# Patient Record
Sex: FEMALE | Race: WHITE | Employment: UNEMPLOYED | URBAN - NONMETROPOLITAN AREA
[De-identification: names, ages, dates, MRNs, and addresses within clinical notes are randomized per-mention and may not be internally consistent; named-entity substitution may affect disease eponyms.]

---

## 2017-01-09 ENCOUNTER — OFFICE VISIT (OUTPATIENT)
Dept: PRIMARY CARE CLINIC | Facility: CLINIC | Age: 57
End: 2017-01-09

## 2017-01-09 VITALS
WEIGHT: 136 LBS | BODY MASS INDEX: 27.42 KG/M2 | DIASTOLIC BLOOD PRESSURE: 80 MMHG | SYSTOLIC BLOOD PRESSURE: 112 MMHG | TEMPERATURE: 98.1 F | HEART RATE: 105 BPM | HEIGHT: 59 IN

## 2017-01-09 DIAGNOSIS — J01.00 ACUTE MAXILLARY SINUSITIS, RECURRENCE NOT SPECIFIED: Primary | ICD-10-CM

## 2017-01-09 PROCEDURE — 99213 OFFICE O/P EST LOW 20 MIN: CPT | Performed by: NURSE PRACTITIONER

## 2017-01-09 RX ORDER — PROMETHAZINE HYDROCHLORIDE AND CODEINE PHOSPHATE 6.25; 1 MG/5ML; MG/5ML
5 SYRUP ORAL EVERY 4 HOURS PRN
Qty: 120 ML | Refills: 0 | Status: SHIPPED | OUTPATIENT
Start: 2017-01-09 | End: 2017-01-16

## 2017-01-09 RX ORDER — AMOXICILLIN AND CLAVULANATE POTASSIUM 875; 125 MG/1; MG/1
1 TABLET, FILM COATED ORAL 2 TIMES DAILY
Qty: 20 TABLET | Refills: 0 | Status: SHIPPED | OUTPATIENT
Start: 2017-01-09 | End: 2017-01-19 | Stop reason: ALTCHOICE

## 2017-01-09 RX ORDER — FLUCONAZOLE 150 MG/1
TABLET ORAL
Qty: 2 TABLET | Refills: 0 | Status: SHIPPED | OUTPATIENT
Start: 2017-01-09 | End: 2017-01-19 | Stop reason: ALTCHOICE

## 2017-01-09 ASSESSMENT — ENCOUNTER SYMPTOMS
COUGH: 1
WHEEZING: 0
SINUS PRESSURE: 1
RHINORRHEA: 1
NAUSEA: 0
SORE THROAT: 1
SHORTNESS OF BREATH: 1
DIARRHEA: 0
HEMOPTYSIS: 0
HEARTBURN: 0
VOMITING: 1

## 2017-01-19 ENCOUNTER — OFFICE VISIT (OUTPATIENT)
Dept: FAMILY MEDICINE CLINIC | Facility: CLINIC | Age: 57
End: 2017-01-19

## 2017-01-19 VITALS
HEIGHT: 55 IN | DIASTOLIC BLOOD PRESSURE: 84 MMHG | WEIGHT: 145 LBS | SYSTOLIC BLOOD PRESSURE: 134 MMHG | HEART RATE: 84 BPM | BODY MASS INDEX: 33.56 KG/M2 | TEMPERATURE: 97.7 F

## 2017-01-19 DIAGNOSIS — H65.01 RIGHT ACUTE SEROUS OTITIS MEDIA, RECURRENCE NOT SPECIFIED: ICD-10-CM

## 2017-01-19 DIAGNOSIS — J01.10 ACUTE NON-RECURRENT FRONTAL SINUSITIS: Primary | ICD-10-CM

## 2017-01-19 PROCEDURE — 99213 OFFICE O/P EST LOW 20 MIN: CPT | Performed by: INTERNAL MEDICINE

## 2017-01-19 RX ORDER — FLUCONAZOLE 150 MG/1
150 TABLET ORAL ONCE
Qty: 1 TABLET | Refills: 0 | Status: SHIPPED | OUTPATIENT
Start: 2017-01-19 | End: 2017-01-19

## 2017-01-19 RX ORDER — DOXYCYCLINE HYCLATE 100 MG
100 TABLET ORAL 2 TIMES DAILY
Qty: 20 TABLET | Refills: 0 | Status: SHIPPED | OUTPATIENT
Start: 2017-01-19 | End: 2017-01-29

## 2017-01-19 ASSESSMENT — ENCOUNTER SYMPTOMS
SHORTNESS OF BREATH: 0
NAUSEA: 0
SORE THROAT: 0
SINUS PAIN: 1
COUGH: 0
DIARRHEA: 0
CONSTIPATION: 0
ABDOMINAL PAIN: 0
CHEST TIGHTNESS: 0
RHINORRHEA: 0
BLOOD IN STOOL: 0

## 2017-02-06 ENCOUNTER — TELEPHONE (OUTPATIENT)
Dept: FAMILY MEDICINE CLINIC | Facility: CLINIC | Age: 57
End: 2017-02-06

## 2017-02-06 DIAGNOSIS — J40 BRONCHITIS: Primary | ICD-10-CM

## 2017-02-06 RX ORDER — FLUCONAZOLE 150 MG/1
150 TABLET ORAL ONCE
Qty: 1 TABLET | Refills: 0 | Status: SHIPPED | OUTPATIENT
Start: 2017-02-06 | End: 2017-02-06

## 2017-02-06 RX ORDER — LEVOFLOXACIN 500 MG/1
500 TABLET, FILM COATED ORAL DAILY
Qty: 7 TABLET | Refills: 0 | Status: SHIPPED | OUTPATIENT
Start: 2017-02-06 | End: 2017-02-13 | Stop reason: ALTCHOICE

## 2017-02-13 ENCOUNTER — OFFICE VISIT (OUTPATIENT)
Dept: FAMILY MEDICINE CLINIC | Facility: CLINIC | Age: 57
End: 2017-02-13

## 2017-02-13 VITALS
DIASTOLIC BLOOD PRESSURE: 84 MMHG | BODY MASS INDEX: 28.47 KG/M2 | HEART RATE: 92 BPM | SYSTOLIC BLOOD PRESSURE: 132 MMHG | HEIGHT: 60 IN | WEIGHT: 145 LBS

## 2017-02-13 DIAGNOSIS — K21.9 GASTROESOPHAGEAL REFLUX DISEASE WITHOUT ESOPHAGITIS: ICD-10-CM

## 2017-02-13 DIAGNOSIS — I10 BENIGN ESSENTIAL HTN: Primary | ICD-10-CM

## 2017-02-13 DIAGNOSIS — Z00.00 WELLNESS EXAMINATION: ICD-10-CM

## 2017-02-13 DIAGNOSIS — Z12.11 COLON CANCER SCREENING: ICD-10-CM

## 2017-02-13 DIAGNOSIS — E78.00 HYPERCHOLESTEREMIA: ICD-10-CM

## 2017-02-13 PROCEDURE — 99214 OFFICE O/P EST MOD 30 MIN: CPT | Performed by: INTERNAL MEDICINE

## 2017-02-13 ASSESSMENT — ENCOUNTER SYMPTOMS
ABDOMINAL PAIN: 0
DIARRHEA: 0
BLOOD IN STOOL: 0
NAUSEA: 0
SORE THROAT: 0
COUGH: 0
SHORTNESS OF BREATH: 0
RHINORRHEA: 0
CONSTIPATION: 0
CHEST TIGHTNESS: 0

## 2017-02-16 ENCOUNTER — NURSE ONLY (OUTPATIENT)
Dept: FAMILY MEDICINE CLINIC | Facility: CLINIC | Age: 57
End: 2017-02-16

## 2017-02-16 DIAGNOSIS — Z12.11 COLON CANCER SCREENING: ICD-10-CM

## 2017-02-16 DIAGNOSIS — Z00.00 WELLNESS EXAMINATION: ICD-10-CM

## 2017-02-16 DIAGNOSIS — E78.00 HYPERCHOLESTEREMIA: Primary | ICD-10-CM

## 2017-02-16 DIAGNOSIS — I10 BENIGN ESSENTIAL HTN: ICD-10-CM

## 2017-02-16 LAB
CONTROL: PRESENT
HEMOCCULT STL QL: NORMAL

## 2017-02-16 PROCEDURE — 36415 COLL VENOUS BLD VENIPUNCTURE: CPT | Performed by: INTERNAL MEDICINE

## 2017-02-16 PROCEDURE — 82274 ASSAY TEST FOR BLOOD FECAL: CPT | Performed by: INTERNAL MEDICINE

## 2017-02-21 DIAGNOSIS — I10 BENIGN ESSENTIAL HTN: ICD-10-CM

## 2017-02-21 RX ORDER — AMLODIPINE BESYLATE 5 MG/1
TABLET ORAL
Qty: 90 TABLET | Refills: 1 | Status: SHIPPED | OUTPATIENT
Start: 2017-02-21 | End: 2017-10-03 | Stop reason: SDUPTHER

## 2017-03-15 RX ORDER — ATORVASTATIN CALCIUM 20 MG/1
TABLET, FILM COATED ORAL
Qty: 90 TABLET | Refills: 1 | Status: SHIPPED | OUTPATIENT
Start: 2017-03-15 | End: 2017-09-07 | Stop reason: SDUPTHER

## 2017-03-16 DIAGNOSIS — N95.9 POSTMENOPAUSAL SYMPTOMS: ICD-10-CM

## 2017-05-02 DIAGNOSIS — I10 BENIGN ESSENTIAL HTN: ICD-10-CM

## 2017-05-02 DIAGNOSIS — K21.9 GASTROESOPHAGEAL REFLUX DISEASE WITHOUT ESOPHAGITIS: ICD-10-CM

## 2017-05-02 RX ORDER — HYDROCHLOROTHIAZIDE 25 MG/1
TABLET ORAL
Qty: 90 TABLET | Refills: 1 | Status: SHIPPED | OUTPATIENT
Start: 2017-05-02 | End: 2017-10-21 | Stop reason: SDUPTHER

## 2017-05-02 RX ORDER — OMEPRAZOLE 20 MG/1
CAPSULE, DELAYED RELEASE ORAL
Qty: 90 CAPSULE | Refills: 1 | Status: SHIPPED | OUTPATIENT
Start: 2017-05-02 | End: 2017-10-03 | Stop reason: SDUPTHER

## 2017-07-27 DIAGNOSIS — J30.2 SEASONAL ALLERGIC RHINITIS, UNSPECIFIED ALLERGIC RHINITIS TRIGGER: ICD-10-CM

## 2017-07-27 RX ORDER — FLUTICASONE PROPIONATE 50 MCG
SPRAY, SUSPENSION (ML) NASAL
Qty: 1 BOTTLE | Refills: 3 | Status: SHIPPED | OUTPATIENT
Start: 2017-07-27 | End: 2017-09-05 | Stop reason: ALTCHOICE

## 2017-09-05 ENCOUNTER — OFFICE VISIT (OUTPATIENT)
Dept: PRIMARY CARE CLINIC | Age: 57
End: 2017-09-05
Payer: COMMERCIAL

## 2017-09-05 VITALS
SYSTOLIC BLOOD PRESSURE: 150 MMHG | WEIGHT: 145.8 LBS | HEIGHT: 59 IN | HEART RATE: 81 BPM | DIASTOLIC BLOOD PRESSURE: 92 MMHG | TEMPERATURE: 97.8 F | BODY MASS INDEX: 29.39 KG/M2 | RESPIRATION RATE: 16 BRPM

## 2017-09-05 DIAGNOSIS — J01.00 ACUTE MAXILLARY SINUSITIS, RECURRENCE NOT SPECIFIED: Primary | ICD-10-CM

## 2017-09-05 PROCEDURE — 99213 OFFICE O/P EST LOW 20 MIN: CPT | Performed by: NURSE PRACTITIONER

## 2017-09-05 RX ORDER — AMOXICILLIN AND CLAVULANATE POTASSIUM 875; 125 MG/1; MG/1
1 TABLET, FILM COATED ORAL 2 TIMES DAILY
Qty: 14 TABLET | Refills: 0 | Status: SHIPPED | OUTPATIENT
Start: 2017-09-05 | End: 2017-09-12

## 2017-09-05 RX ORDER — FLUTICASONE PROPIONATE 50 MCG
2 SPRAY, SUSPENSION (ML) NASAL DAILY
Qty: 1 BOTTLE | Refills: 0 | Status: SHIPPED | OUTPATIENT
Start: 2017-09-05 | End: 2017-12-13

## 2017-09-05 ASSESSMENT — ENCOUNTER SYMPTOMS
SORE THROAT: 0
SINUS PRESSURE: 1
TROUBLE SWALLOWING: 0
COUGH: 0

## 2017-09-08 RX ORDER — ATORVASTATIN CALCIUM 20 MG/1
TABLET, FILM COATED ORAL
Qty: 90 TABLET | Refills: 1 | Status: SHIPPED | OUTPATIENT
Start: 2017-09-08 | End: 2018-02-27

## 2017-09-22 ENCOUNTER — OFFICE VISIT (OUTPATIENT)
Dept: FAMILY MEDICINE CLINIC | Age: 57
End: 2017-09-22
Payer: COMMERCIAL

## 2017-09-22 VITALS
WEIGHT: 144 LBS | HEART RATE: 80 BPM | BODY MASS INDEX: 29.08 KG/M2 | DIASTOLIC BLOOD PRESSURE: 72 MMHG | SYSTOLIC BLOOD PRESSURE: 134 MMHG

## 2017-09-22 DIAGNOSIS — J20.9 BRONCHITIS WITH BRONCHOSPASM: Primary | ICD-10-CM

## 2017-09-22 DIAGNOSIS — F17.200 SMOKER: ICD-10-CM

## 2017-09-22 PROCEDURE — 99213 OFFICE O/P EST LOW 20 MIN: CPT | Performed by: INTERNAL MEDICINE

## 2017-09-22 RX ORDER — PREDNISONE 20 MG/1
TABLET ORAL
Qty: 15 TABLET | Refills: 0 | Status: SHIPPED | OUTPATIENT
Start: 2017-09-22 | End: 2017-10-02

## 2017-09-22 RX ORDER — DOXYCYCLINE 100 MG/1
100 TABLET ORAL 2 TIMES DAILY
Qty: 20 TABLET | Refills: 0 | Status: SHIPPED | OUTPATIENT
Start: 2017-09-22 | End: 2017-10-02

## 2017-09-22 RX ORDER — FLUCONAZOLE 150 MG/1
150 TABLET ORAL ONCE
Qty: 1 TABLET | Refills: 0 | Status: SHIPPED | OUTPATIENT
Start: 2017-09-22 | End: 2017-09-22

## 2017-09-22 RX ORDER — VARENICLINE TARTRATE 1 MG/1
1 TABLET, FILM COATED ORAL 2 TIMES DAILY
Qty: 60 TABLET | Refills: 2
Start: 2017-09-22 | End: 2017-10-30 | Stop reason: SDUPTHER

## 2017-09-22 RX ORDER — VARENICLINE TARTRATE 25 MG
KIT ORAL
Qty: 1 EACH | Refills: 0
Start: 2017-09-22 | End: 2017-10-30

## 2017-09-22 ASSESSMENT — ENCOUNTER SYMPTOMS
COUGH: 1
BLOOD IN STOOL: 0
CONSTIPATION: 0
NAUSEA: 0
SHORTNESS OF BREATH: 1
ABDOMINAL PAIN: 0
WHEEZING: 1
SORE THROAT: 0
DIARRHEA: 0
RHINORRHEA: 0
CHEST TIGHTNESS: 0

## 2017-10-03 DIAGNOSIS — I10 BENIGN ESSENTIAL HTN: ICD-10-CM

## 2017-10-03 DIAGNOSIS — K21.9 GASTROESOPHAGEAL REFLUX DISEASE WITHOUT ESOPHAGITIS: ICD-10-CM

## 2017-10-03 RX ORDER — OMEPRAZOLE 20 MG/1
CAPSULE, DELAYED RELEASE ORAL
Qty: 90 CAPSULE | Refills: 1 | Status: SHIPPED | OUTPATIENT
Start: 2017-10-03 | End: 2018-02-27 | Stop reason: SDUPTHER

## 2017-10-03 RX ORDER — AMLODIPINE BESYLATE 5 MG/1
TABLET ORAL
Qty: 90 TABLET | Refills: 1 | Status: SHIPPED | OUTPATIENT
Start: 2017-10-03 | End: 2018-01-04 | Stop reason: SDUPTHER

## 2017-10-18 ENCOUNTER — TELEPHONE (OUTPATIENT)
Dept: FAMILY MEDICINE CLINIC | Age: 57
End: 2017-10-18

## 2017-10-21 DIAGNOSIS — I10 BENIGN ESSENTIAL HTN: ICD-10-CM

## 2017-10-21 DIAGNOSIS — N95.9 POSTMENOPAUSAL SYMPTOMS: ICD-10-CM

## 2017-10-23 DIAGNOSIS — Z12.31 ENCOUNTER FOR SCREENING MAMMOGRAM FOR BREAST CANCER: Primary | ICD-10-CM

## 2017-10-23 RX ORDER — HYDROCHLOROTHIAZIDE 25 MG/1
TABLET ORAL
Qty: 90 TABLET | Refills: 1 | Status: SHIPPED | OUTPATIENT
Start: 2017-10-23 | End: 2018-02-27 | Stop reason: SDUPTHER

## 2017-10-23 NOTE — TELEPHONE ENCOUNTER
Health Maintenance   Topic Date Due    Cervical cancer screen  02/14/2017    Breast cancer screen  04/01/2017    Flu vaccine (1) 09/01/2017    Colon Cancer Screen FIT/FOBT  02/16/2018    Lipid screen  02/16/2022    DTaP/Tdap/Td vaccine (2 - Td) 02/08/2023    Pneumococcal med risk  Completed    Hepatitis C screen  Completed    HIV screen  Completed             (applicable per patient's age: Cancer Screenings, Depression Screening, Fall Risk Screening, Immunizations)    LDL Cholesterol (mg/dL)   Date Value   02/16/2017 129     AST (U/L)   Date Value   02/16/2017 15     ALT (U/L)   Date Value   02/16/2017 13     BUN (mg/dL)   Date Value   02/16/2017 15      (goal A1C is < 7)   (goal LDL is <100) need 30-50% reduction from baseline     BP Readings from Last 3 Encounters:   09/22/17 134/72   09/05/17 (!) 150/92   02/13/17 132/84    (goal /80)      All Future Testing planned in [facility]:  Lab Frequency Next Occurrence       Next Visit Date:  No future appointments.          Patient Active Problem List:     Hypercholesteremia     GERD (gastroesophageal reflux disease)     Benign essential HTN     Postmenopausal symptoms     Seasonal allergies

## 2017-10-30 DIAGNOSIS — F17.200 SMOKER: ICD-10-CM

## 2017-10-30 RX ORDER — VARENICLINE TARTRATE 1 MG/1
1 TABLET, FILM COATED ORAL 2 TIMES DAILY
Qty: 60 TABLET | Refills: 0 | Status: SHIPPED | OUTPATIENT
Start: 2017-10-30 | End: 2018-01-04

## 2017-11-03 ENCOUNTER — NURSE ONLY (OUTPATIENT)
Dept: FAMILY MEDICINE CLINIC | Age: 57
End: 2017-11-03
Payer: COMMERCIAL

## 2017-11-03 DIAGNOSIS — Z23 NEED FOR INFLUENZA VACCINATION: Primary | ICD-10-CM

## 2017-11-03 PROCEDURE — 90686 IIV4 VACC NO PRSV 0.5 ML IM: CPT | Performed by: INTERNAL MEDICINE

## 2017-11-03 PROCEDURE — 90471 IMMUNIZATION ADMIN: CPT | Performed by: INTERNAL MEDICINE

## 2017-11-07 NOTE — PROGRESS NOTES
After obtaining consent, and per orders of Dr. Claudy Smith, injection of Influenza Vacc given in Left deltoid by Melisa Armenta. Patient instructed to remain in clinic for 20 minutes afterwards, and to report any adverse reaction to me immediately.

## 2017-12-13 DIAGNOSIS — J30.2 SEASONAL ALLERGIC RHINITIS: ICD-10-CM

## 2017-12-13 RX ORDER — FLUTICASONE PROPIONATE 50 MCG
SPRAY, SUSPENSION (ML) NASAL
Qty: 1 BOTTLE | Refills: 3 | Status: SHIPPED | OUTPATIENT
Start: 2017-12-13 | End: 2018-02-27 | Stop reason: SDUPTHER

## 2018-01-04 ENCOUNTER — OFFICE VISIT (OUTPATIENT)
Dept: FAMILY MEDICINE CLINIC | Age: 58
End: 2018-01-04
Payer: COMMERCIAL

## 2018-01-04 VITALS — HEIGHT: 60 IN | WEIGHT: 147 LBS | BODY MASS INDEX: 28.86 KG/M2

## 2018-01-04 DIAGNOSIS — J01.90 ACUTE BACTERIAL SINUSITIS: Primary | ICD-10-CM

## 2018-01-04 DIAGNOSIS — J20.9 BRONCHITIS WITH BRONCHOSPASM: ICD-10-CM

## 2018-01-04 DIAGNOSIS — B96.89 ACUTE BACTERIAL SINUSITIS: Primary | ICD-10-CM

## 2018-01-04 DIAGNOSIS — I10 BENIGN ESSENTIAL HTN: ICD-10-CM

## 2018-01-04 PROCEDURE — 99213 OFFICE O/P EST LOW 20 MIN: CPT | Performed by: INTERNAL MEDICINE

## 2018-01-04 RX ORDER — DOXYCYCLINE HYCLATE 100 MG
100 TABLET ORAL 2 TIMES DAILY
Qty: 28 TABLET | Refills: 0 | Status: SHIPPED | OUTPATIENT
Start: 2018-01-04 | End: 2018-01-18

## 2018-01-04 RX ORDER — FLUCONAZOLE 150 MG/1
150 TABLET ORAL ONCE
Qty: 1 TABLET | Refills: 0 | Status: SHIPPED | OUTPATIENT
Start: 2018-01-04 | End: 2018-01-04

## 2018-01-04 RX ORDER — PREDNISONE 20 MG/1
TABLET ORAL
Qty: 12 TABLET | Refills: 0 | Status: SHIPPED | OUTPATIENT
Start: 2018-01-04 | End: 2018-01-14

## 2018-01-04 RX ORDER — AMLODIPINE BESYLATE 5 MG/1
TABLET ORAL
Qty: 90 TABLET | Refills: 1 | Status: SHIPPED | OUTPATIENT
Start: 2018-01-04 | End: 2018-02-27 | Stop reason: SDUPTHER

## 2018-01-04 ASSESSMENT — PATIENT HEALTH QUESTIONNAIRE - PHQ9
SUM OF ALL RESPONSES TO PHQ QUESTIONS 1-9: 0
SUM OF ALL RESPONSES TO PHQ9 QUESTIONS 1 & 2: 0
1. LITTLE INTEREST OR PLEASURE IN DOING THINGS: 0
2. FEELING DOWN, DEPRESSED OR HOPELESS: 0

## 2018-01-04 ASSESSMENT — ENCOUNTER SYMPTOMS
RHINORRHEA: 0
CONSTIPATION: 0
SHORTNESS OF BREATH: 0
ABDOMINAL PAIN: 0
SINUS PAIN: 1
CHEST TIGHTNESS: 0
SORE THROAT: 0
BLOOD IN STOOL: 0
DIARRHEA: 0
COUGH: 1
NAUSEA: 0

## 2018-01-04 NOTE — PATIENT INSTRUCTIONS
results and keep a list of the medicines you take. How can you care for yourself at home? · Take an over-the-counter pain medicine, such as acetaminophen (Tylenol), ibuprofen (Advil, Motrin), or naproxen (Aleve). Read and follow all instructions on the label. · If the doctor prescribed antibiotics, take them as directed. Do not stop taking them just because you feel better. You need to take the full course of antibiotics. · Be careful when taking over-the-counter cold or flu medicines and Tylenol at the same time. Many of these medicines have acetaminophen, which is Tylenol. Read the labels to make sure that you are not taking more than the recommended dose. Too much acetaminophen (Tylenol) can be harmful. · Breathe warm, moist air from a steamy shower, a hot bath, or a sink filled with hot water. Avoid cold, dry air. Using a humidifier in your home may help. Follow the directions for cleaning the machine. · Use saline (saltwater) nasal washes to help keep your nasal passages open and wash out mucus and bacteria. You can buy saline nose drops at a grocery store or drugstore. Or you can make your own at home by adding 1 teaspoon of salt and 1 teaspoon of baking soda to 2 cups of distilled water. If you make your own, fill a bulb syringe with the solution, insert the tip into your nostril, and squeeze gently. Ana Lilia Stade your nose. · Put a hot, wet towel or a warm gel pack on your face 3 or 4 times a day for 5 to 10 minutes each time. · Try a decongestant nasal spray like oxymetazoline (Afrin). Do not use it for more than 3 days in a row. Using it for more than 3 days can make your congestion worse. When should you call for help? Call your doctor now or seek immediate medical care if:  ? · You have new or worse swelling or redness in your face or around your eyes. ? · You have a new or higher fever. ? Watch closely for changes in your health, and be sure to contact your doctor if:  ? · You have new or worse facial pain. ? · The mucus from your nose becomes thicker (like pus) or has new blood in it. ? · You are not getting better as expected. Where can you learn more? Go to [email or web address]://[email or web address]. org and sign in to your [employer] account. Enter T369 in the Zwamy box to learn more about \"Sinusitis: Care Instructions. \"     If you do not have an account, please click on the \"Sign Up Now\" link. Current as of: May 12, 2017  Content Version: 11.5  © 1860-0677 Healthwise, Incorporated. Care instructions adapted under license by [facility] ([facility]). If you have questions about a medical condition or this instruction, always ask your healthcare professional. [address] any warranty or liability for your use of this information.

## 2018-01-04 NOTE — PROGRESS NOTES
Visit Information    Have you changed or started any medications since your last visit including any over-the-counter medicines, vitamins, or herbal medicines? no   Are you having any side effects from any of your medications? -  no  Have you stopped taking any of your medications? Is so, why? -  no    Have you seen any other physician or provider since your last visit? No  Have you had any other diagnostic tests since your last visit? No  Have you been seen in the emergency room and/or had an admission to a hospital since we last saw you? No  Have you had your routine dental cleaning in the past 6 months? yes -     Have you activated your [employer] account? If not, what are your barriers?  Yes     Patient Care Team:  Bradnon Reyna MD as PCP - General (Internal Medicine)    Medical History Review  Past Medical, Family, and Social History reviewed and does contribute to the patient presenting condition    Health Maintenance   Topic Date Due    Potassium monitoring  1960    Creatinine monitoring  1960    Smoker: low dose lung CT screening  03/21/2013    Cervical cancer screen  02/14/2017    Breast cancer screen  04/01/2017    Colon Cancer Screen FIT/FOBT  02/16/2018    Lipid screen  02/16/2022    DTaP/Tdap/Td vaccine (2 - Td) 02/08/2023    Flu vaccine  Completed    Pneumococcal med risk  Completed    Hepatitis C screen  Completed    HIV screen  Completed
normal heart sounds. No murmur heard. Pulmonary/Chest: Effort normal. No respiratory distress. She has wheezes. Abdominal: Bowel sounds are normal. There is no tenderness. Musculoskeletal: Normal range of motion. She exhibits no edema. Lymphadenopathy:     She has no cervical adenopathy. Neurological: She is alert. She is not disoriented. Skin: Skin is warm and dry. No rash noted. No pallor. Psychiatric: She has a normal mood and affect. Her speech is normal and behavior is normal. Judgment and thought content normal. Cognition and memory are normal.   Nursing note and vitals reviewed. Assessment:      1. Acute bacterial sinusitis  doxycycline hyclate (VIBRA-TABS) 100 MG tablet   2. Benign essential HTN  amLODIPine (NORVASC) 5 MG tablet   3. Bronchitis with bronchospasm  predniSONE (DELTASONE) 20 MG tablet           Plan:      1. Benign essential HTN  Amlodipine sent in.  - amLODIPine (NORVASC) 5 MG tablet; TAKE 1 TABLET BY MOUTH EVERY DAY  Dispense: 90 tablet; Refill: 1    2. Acute bacterial sinusitis  Take Doxycycline 100 mg two times a day x 7 to 10 days. - doxycycline hyclate (VIBRA-TABS) 100 MG tablet; Take 1 tablet by mouth 2 times daily for 14 days  Dispense: 28 tablet; Refill: 0    3. Bronchitis with bronchospasm  Take the prednisone taper as directed on the prescription. The prednisone is very bitter so swallow the tablets quickly to prevent  dissolving in the mouth. After taking, don't lay  down for 2 hours to help prevent reflux. - predniSONE (DELTASONE) 20 MG tablet; 3 tabs daily for 2 days then 2 tabs daily for 2 days then 1 tab daily for 2 days. Dispense: 12 tablet; Refill: 0    Susan Grande is instructed to return to the clinic if the symptoms continue or worsen. Susan Grande  was also instructed to go to the emergency room department if the symptoms significantly worsen before an appointment can be made. Patient given educational material or instruction on sinusitis.   After visit

## 2018-02-22 ENCOUNTER — NURSE ONLY (OUTPATIENT)
Dept: FAMILY MEDICINE CLINIC | Age: 58
End: 2018-02-22

## 2018-02-22 ENCOUNTER — HOSPITAL ENCOUNTER (OUTPATIENT)
Age: 58
Setting detail: SPECIMEN
Discharge: HOME | End: 2018-02-22
Payer: COMMERCIAL

## 2018-02-22 DIAGNOSIS — Z00.00 WELLNESS EXAMINATION: Primary | ICD-10-CM

## 2018-02-22 DIAGNOSIS — Z00.00 WELLNESS EXAMINATION: ICD-10-CM

## 2018-02-22 LAB
ALBUMIN SERPL-MCNC: 4.5 G/DL (ref 3.5–5.2)
ALBUMIN/GLOBULIN RATIO: ABNORMAL (ref 1–2.5)
ALP BLD-CCNC: 87 U/L (ref 35–104)
ALT SERPL-CCNC: 14 U/L (ref 5–33)
ANION GAP SERPL CALCULATED.3IONS-SCNC: 12 MMOL/L (ref 9–17)
AST SERPL-CCNC: 19 U/L
BILIRUB SERPL-MCNC: 0.7 MG/DL (ref 0.3–1.2)
BUN BLDV-MCNC: 14 MG/DL (ref 6–20)
BUN/CREAT BLD: 21 (ref 9–20)
CALCIUM SERPL-MCNC: 9.5 MG/DL (ref 8.6–10.4)
CHLORIDE BLD-SCNC: 93 MMOL/L (ref 98–107)
CHOLESTEROL/HDL RATIO: 3.7
CHOLESTEROL: 220 MG/DL
CO2: 29 MMOL/L (ref 20–31)
CREAT SERPL-MCNC: 0.68 MG/DL (ref 0.5–0.9)
GFR AFRICAN AMERICAN: >60 ML/MIN
GFR NON-AFRICAN AMERICAN: >60 ML/MIN
GFR SERPL CREATININE-BSD FRML MDRD: ABNORMAL ML/MIN/{1.73_M2}
GFR SERPL CREATININE-BSD FRML MDRD: ABNORMAL ML/MIN/{1.73_M2}
GLUCOSE BLD-MCNC: 92 MG/DL (ref 70–99)
HDLC SERPL-MCNC: 59 MG/DL
LDL CHOLESTEROL: 120 MG/DL (ref 0–130)
PATIENT FASTING?: YES
POTASSIUM SERPL-SCNC: 3.9 MMOL/L (ref 3.7–5.3)
SODIUM BLD-SCNC: 134 MMOL/L (ref 135–144)
TOTAL PROTEIN: 7.8 G/DL (ref 6.4–8.3)
TRIGL SERPL-MCNC: 205 MG/DL
VLDLC SERPL CALC-MCNC: ABNORMAL MG/DL (ref 1–30)

## 2018-02-22 PROCEDURE — 80061 LIPID PANEL: CPT

## 2018-02-22 PROCEDURE — 80053 COMPREHEN METABOLIC PANEL: CPT

## 2018-02-26 ENCOUNTER — HOSPITAL ENCOUNTER (OUTPATIENT)
Dept: MAMMOGRAPHY | Age: 58
Discharge: HOME | End: 2018-02-28
Payer: COMMERCIAL

## 2018-02-26 DIAGNOSIS — Z12.31 ENCOUNTER FOR SCREENING MAMMOGRAM FOR BREAST CANCER: ICD-10-CM

## 2018-02-26 PROCEDURE — 77067 SCR MAMMO BI INCL CAD: CPT

## 2018-02-27 ENCOUNTER — OFFICE VISIT (OUTPATIENT)
Dept: FAMILY MEDICINE CLINIC | Age: 58
End: 2018-02-27
Payer: COMMERCIAL

## 2018-02-27 VITALS
SYSTOLIC BLOOD PRESSURE: 148 MMHG | BODY MASS INDEX: 28.66 KG/M2 | WEIGHT: 146 LBS | HEART RATE: 85 BPM | DIASTOLIC BLOOD PRESSURE: 94 MMHG | HEIGHT: 60 IN

## 2018-02-27 DIAGNOSIS — E78.2 MIXED HYPERCHOLESTEROLEMIA AND HYPERTRIGLYCERIDEMIA: ICD-10-CM

## 2018-02-27 DIAGNOSIS — N95.9 POSTMENOPAUSAL SYMPTOMS: ICD-10-CM

## 2018-02-27 DIAGNOSIS — Z00.00 WELLNESS EXAMINATION: Primary | ICD-10-CM

## 2018-02-27 DIAGNOSIS — K21.9 GASTROESOPHAGEAL REFLUX DISEASE WITHOUT ESOPHAGITIS: ICD-10-CM

## 2018-02-27 DIAGNOSIS — I10 BENIGN ESSENTIAL HTN: ICD-10-CM

## 2018-02-27 DIAGNOSIS — J30.2 SEASONAL ALLERGIC RHINITIS, UNSPECIFIED CHRONICITY, UNSPECIFIED TRIGGER: ICD-10-CM

## 2018-02-27 PROCEDURE — 99396 PREV VISIT EST AGE 40-64: CPT | Performed by: INTERNAL MEDICINE

## 2018-02-27 RX ORDER — OMEPRAZOLE 20 MG/1
CAPSULE, DELAYED RELEASE ORAL
Qty: 90 CAPSULE | Refills: 1 | Status: SHIPPED | OUTPATIENT
Start: 2018-02-27 | End: 2018-04-02

## 2018-02-27 RX ORDER — ATORVASTATIN CALCIUM 20 MG/1
TABLET, FILM COATED ORAL
Qty: 90 TABLET | Refills: 1 | Status: CANCELLED | OUTPATIENT
Start: 2018-02-27

## 2018-02-27 RX ORDER — HYDROCHLOROTHIAZIDE 25 MG/1
TABLET ORAL
Qty: 90 TABLET | Refills: 1 | Status: SHIPPED | OUTPATIENT
Start: 2018-02-27 | End: 2018-04-23

## 2018-02-27 RX ORDER — MONTELUKAST SODIUM 10 MG/1
10 TABLET ORAL NIGHTLY
Qty: 90 TABLET | Refills: 1 | Status: SHIPPED | OUTPATIENT
Start: 2018-02-27 | End: 2018-05-31

## 2018-02-27 RX ORDER — ATORVASTATIN CALCIUM 40 MG/1
40 TABLET, FILM COATED ORAL DAILY
Qty: 90 TABLET | Refills: 1 | Status: SHIPPED | OUTPATIENT
Start: 2018-02-27 | End: 2018-05-31 | Stop reason: SDUPTHER

## 2018-02-27 RX ORDER — AMLODIPINE BESYLATE 5 MG/1
TABLET ORAL
Qty: 90 TABLET | Refills: 1 | Status: SHIPPED | OUTPATIENT
Start: 2018-02-27 | End: 2018-04-02

## 2018-02-27 RX ORDER — FLUTICASONE PROPIONATE 50 MCG
SPRAY, SUSPENSION (ML) NASAL
Qty: 1 BOTTLE | Refills: 3 | Status: SHIPPED | OUTPATIENT
Start: 2018-02-27 | End: 2018-04-16 | Stop reason: SDUPTHER

## 2018-02-27 ASSESSMENT — ENCOUNTER SYMPTOMS
CHEST TIGHTNESS: 0
SHORTNESS OF BREATH: 0
NAUSEA: 0
DIARRHEA: 0
SORE THROAT: 0
ABDOMINAL PAIN: 0
CONSTIPATION: 0
BLOOD IN STOOL: 0
RHINORRHEA: 0
COUGH: 0

## 2018-02-27 NOTE — PROGRESS NOTES
Subjective:      Patient ID: Vashti Shepard is a 59 y.o. female. Aria Alcala presents for a wellness check and check up on her medical conditions. Aria Alcala denies new problems. Medications were reviewed with Aria Alcala, she is  tolerating the medication. Bowels are regular. There has not been rectal bleeding. Aria Alcala denies urinary complications, the urine stream is good. Aria Alcala denies chest pain and denies increasing shortness of breath. Labs from 2/18 reviewed. BP has been well controlled outside the office. Zoloft is controlling anxiety / depression well.     Lab Results       Component                Value               Date                       NA                       134 (L)             02/22/2018                 K                        3.9                 02/22/2018                 CL                       93 (L)              02/22/2018                 CO2                      29                  02/22/2018                 BUN                      14                  02/22/2018                 CREATININE               0.68                02/22/2018                 GLUCOSE                  92                  02/22/2018                 CALCIUM                  9.5                 02/22/2018                 PROT                     7.8                 02/22/2018                 LABALBU                  4.5                 02/22/2018                 BILITOT                  0.70                02/22/2018                 ALKPHOS                  87                  02/22/2018                 AST                      19                  02/22/2018                 ALT                      14                  02/22/2018                 LABGLOM                  >60                 02/22/2018                 GFRAA                    >60                 02/22/2018              No results found for: LABA1C  No results found for: EAG    Lab Results       Component                Value               Date

## 2018-03-13 ENCOUNTER — NURSE ONLY (OUTPATIENT)
Dept: FAMILY MEDICINE CLINIC | Age: 58
End: 2018-03-13

## 2018-03-13 VITALS — SYSTOLIC BLOOD PRESSURE: 130 MMHG | HEART RATE: 80 BPM | DIASTOLIC BLOOD PRESSURE: 80 MMHG

## 2018-03-13 DIAGNOSIS — I10 BENIGN ESSENTIAL HTN: Primary | ICD-10-CM

## 2018-03-31 DIAGNOSIS — I10 BENIGN ESSENTIAL HTN: ICD-10-CM

## 2018-03-31 DIAGNOSIS — K21.9 GASTROESOPHAGEAL REFLUX DISEASE WITHOUT ESOPHAGITIS: ICD-10-CM

## 2018-04-02 RX ORDER — AMLODIPINE BESYLATE 5 MG/1
TABLET ORAL
Qty: 90 TABLET | Refills: 1 | Status: SHIPPED | OUTPATIENT
Start: 2018-04-02 | End: 2018-07-10 | Stop reason: SDUPTHER

## 2018-04-02 RX ORDER — OMEPRAZOLE 20 MG/1
CAPSULE, DELAYED RELEASE ORAL
Qty: 90 CAPSULE | Refills: 1 | Status: SHIPPED | OUTPATIENT
Start: 2018-04-02 | End: 2018-07-30 | Stop reason: SDUPTHER

## 2018-04-09 ENCOUNTER — TELEPHONE (OUTPATIENT)
Dept: FAMILY MEDICINE CLINIC | Age: 58
End: 2018-04-09

## 2018-04-09 DIAGNOSIS — Z12.11 COLON CANCER SCREENING: Primary | ICD-10-CM

## 2018-04-16 DIAGNOSIS — J30.2 SEASONAL ALLERGIC RHINITIS, UNSPECIFIED CHRONICITY, UNSPECIFIED TRIGGER: ICD-10-CM

## 2018-04-16 RX ORDER — FLUTICASONE PROPIONATE 50 MCG
SPRAY, SUSPENSION (ML) NASAL
Qty: 1 BOTTLE | Refills: 3 | Status: SHIPPED | OUTPATIENT
Start: 2018-04-16 | End: 2018-05-31 | Stop reason: SDUPTHER

## 2018-04-23 DIAGNOSIS — N95.9 POSTMENOPAUSAL SYMPTOMS: ICD-10-CM

## 2018-04-23 DIAGNOSIS — I10 BENIGN ESSENTIAL HTN: ICD-10-CM

## 2018-04-23 RX ORDER — HYDROCHLOROTHIAZIDE 25 MG/1
TABLET ORAL
Qty: 90 TABLET | Refills: 1 | Status: SHIPPED | OUTPATIENT
Start: 2018-04-23 | End: 2018-04-30 | Stop reason: SDUPTHER

## 2018-04-30 DIAGNOSIS — N95.9 POSTMENOPAUSAL SYMPTOMS: ICD-10-CM

## 2018-04-30 DIAGNOSIS — Z12.11 COLON CANCER SCREENING: ICD-10-CM

## 2018-04-30 DIAGNOSIS — I10 BENIGN ESSENTIAL HTN: ICD-10-CM

## 2018-04-30 LAB
CONTROL: PRESENT
HEMOCCULT STL QL: POSITIVE

## 2018-04-30 PROCEDURE — 82274 ASSAY TEST FOR BLOOD FECAL: CPT | Performed by: INTERNAL MEDICINE

## 2018-04-30 RX ORDER — HYDROCHLOROTHIAZIDE 25 MG/1
TABLET ORAL
Qty: 90 TABLET | Refills: 1 | Status: SHIPPED | OUTPATIENT
Start: 2018-04-30 | End: 2019-03-11 | Stop reason: SDUPTHER

## 2018-05-31 ENCOUNTER — OFFICE VISIT (OUTPATIENT)
Dept: FAMILY MEDICINE CLINIC | Age: 58
End: 2018-05-31
Payer: COMMERCIAL

## 2018-05-31 VITALS
BODY MASS INDEX: 28.83 KG/M2 | WEIGHT: 143 LBS | HEIGHT: 59 IN | HEART RATE: 88 BPM | SYSTOLIC BLOOD PRESSURE: 156 MMHG | DIASTOLIC BLOOD PRESSURE: 90 MMHG

## 2018-05-31 DIAGNOSIS — Z01.818 PRE-OP TESTING: ICD-10-CM

## 2018-05-31 DIAGNOSIS — Z12.11 COLON CANCER SCREENING: Primary | ICD-10-CM

## 2018-05-31 DIAGNOSIS — R19.5 POSITIVE FIT (FECAL IMMUNOCHEMICAL TEST): ICD-10-CM

## 2018-05-31 DIAGNOSIS — J30.2 SEASONAL ALLERGIC RHINITIS, UNSPECIFIED CHRONICITY, UNSPECIFIED TRIGGER: ICD-10-CM

## 2018-05-31 DIAGNOSIS — E78.2 MIXED HYPERCHOLESTEROLEMIA AND HYPERTRIGLYCERIDEMIA: ICD-10-CM

## 2018-05-31 PROCEDURE — 99213 OFFICE O/P EST LOW 20 MIN: CPT | Performed by: INTERNAL MEDICINE

## 2018-05-31 RX ORDER — SODIUM, POTASSIUM,MAG SULFATES 17.5-3.13G
SOLUTION, RECONSTITUTED, ORAL ORAL
Qty: 1 BOTTLE | Refills: 0
Start: 2018-05-31 | End: 2019-02-04 | Stop reason: ALTCHOICE

## 2018-05-31 RX ORDER — ATORVASTATIN CALCIUM 40 MG/1
40 TABLET, FILM COATED ORAL DAILY
Qty: 90 TABLET | Refills: 1 | Status: SHIPPED | OUTPATIENT
Start: 2018-05-31 | End: 2019-03-10 | Stop reason: SDUPTHER

## 2018-05-31 RX ORDER — FLUTICASONE PROPIONATE 50 MCG
SPRAY, SUSPENSION (ML) NASAL
Qty: 1 BOTTLE | Refills: 3 | Status: SHIPPED | OUTPATIENT
Start: 2018-05-31 | End: 2019-03-11 | Stop reason: SDUPTHER

## 2018-05-31 ASSESSMENT — ENCOUNTER SYMPTOMS
CONSTIPATION: 0
NAUSEA: 0
SHORTNESS OF BREATH: 0
RHINORRHEA: 0
CHEST TIGHTNESS: 0
COUGH: 0
SORE THROAT: 0
ABDOMINAL PAIN: 0
DIARRHEA: 0
BLOOD IN STOOL: 0

## 2018-06-01 ENCOUNTER — HOSPITAL ENCOUNTER (OUTPATIENT)
Age: 58
Discharge: HOME | End: 2018-06-01
Payer: COMMERCIAL

## 2018-06-01 ENCOUNTER — ANESTHESIA EVENT (OUTPATIENT)
Dept: OPERATING ROOM | Age: 58
End: 2018-06-01
Payer: COMMERCIAL

## 2018-06-01 DIAGNOSIS — Z01.818 PRE-OP TESTING: ICD-10-CM

## 2018-06-01 LAB
EKG ATRIAL RATE: 80 BPM
EKG P AXIS: 49 DEGREES
EKG P-R INTERVAL: 142 MS
EKG Q-T INTERVAL: 370 MS
EKG QRS DURATION: 88 MS
EKG QTC CALCULATION (BAZETT): 426 MS
EKG R AXIS: 37 DEGREES
EKG T AXIS: 52 DEGREES
EKG VENTRICULAR RATE: 80 BPM

## 2018-06-01 PROCEDURE — 93005 ELECTROCARDIOGRAM TRACING: CPT

## 2018-06-08 ENCOUNTER — HOSPITAL ENCOUNTER (OUTPATIENT)
Age: 58
Setting detail: OUTPATIENT SURGERY
Discharge: HOME | End: 2018-06-08
Attending: INTERNAL MEDICINE | Admitting: INTERNAL MEDICINE
Payer: COMMERCIAL

## 2018-06-08 ENCOUNTER — ANESTHESIA (OUTPATIENT)
Dept: OPERATING ROOM | Age: 58
End: 2018-06-08
Payer: COMMERCIAL

## 2018-06-08 VITALS — DIASTOLIC BLOOD PRESSURE: 68 MMHG | TEMPERATURE: 98.6 F | SYSTOLIC BLOOD PRESSURE: 116 MMHG | OXYGEN SATURATION: 98 %

## 2018-06-08 VITALS
DIASTOLIC BLOOD PRESSURE: 76 MMHG | WEIGHT: 140 LBS | BODY MASS INDEX: 28.22 KG/M2 | TEMPERATURE: 97.7 F | SYSTOLIC BLOOD PRESSURE: 133 MMHG | OXYGEN SATURATION: 97 % | RESPIRATION RATE: 16 BRPM | HEART RATE: 86 BPM | HEIGHT: 59 IN

## 2018-06-08 PROCEDURE — 3609010600 HC COLONOSCOPY POLYPECTOMY SNARE/COLD BIOPSY: Performed by: INTERNAL MEDICINE

## 2018-06-08 PROCEDURE — 3700000001 HC ADD 15 MINUTES (ANESTHESIA): Performed by: INTERNAL MEDICINE

## 2018-06-08 PROCEDURE — 2580000003 HC RX 258: Performed by: INTERNAL MEDICINE

## 2018-06-08 PROCEDURE — 6360000002 HC RX W HCPCS: Performed by: NURSE ANESTHETIST, CERTIFIED REGISTERED

## 2018-06-08 PROCEDURE — 88305 TISSUE EXAM BY PATHOLOGIST: CPT

## 2018-06-08 PROCEDURE — 6370000000 HC RX 637 (ALT 250 FOR IP): Performed by: INTERNAL MEDICINE

## 2018-06-08 PROCEDURE — 7100000010 HC PHASE II RECOVERY - FIRST 15 MIN: Performed by: INTERNAL MEDICINE

## 2018-06-08 PROCEDURE — 7100000011 HC PHASE II RECOVERY - ADDTL 15 MIN: Performed by: INTERNAL MEDICINE

## 2018-06-08 PROCEDURE — 45380 COLONOSCOPY AND BIOPSY: CPT | Performed by: INTERNAL MEDICINE

## 2018-06-08 PROCEDURE — 3700000000 HC ANESTHESIA ATTENDED CARE: Performed by: INTERNAL MEDICINE

## 2018-06-08 PROCEDURE — 2500000003 HC RX 250 WO HCPCS: Performed by: NURSE ANESTHETIST, CERTIFIED REGISTERED

## 2018-06-08 RX ORDER — SODIUM CHLORIDE 0.9 % (FLUSH) 0.9 %
10 SYRINGE (ML) INJECTION EVERY 12 HOURS SCHEDULED
Status: DISCONTINUED | OUTPATIENT
Start: 2018-06-08 | End: 2018-06-08 | Stop reason: HOSPADM

## 2018-06-08 RX ORDER — GLYCOPYRROLATE 0.2 MG/ML
INJECTION INTRAMUSCULAR; INTRAVENOUS PRN
Status: DISCONTINUED | OUTPATIENT
Start: 2018-06-08 | End: 2018-06-08 | Stop reason: SDUPTHER

## 2018-06-08 RX ORDER — LIDOCAINE HYDROCHLORIDE 10 MG/ML
INJECTION, SOLUTION EPIDURAL; INFILTRATION; INTRACAUDAL; PERINEURAL PRN
Status: DISCONTINUED | OUTPATIENT
Start: 2018-06-08 | End: 2018-06-08 | Stop reason: SDUPTHER

## 2018-06-08 RX ORDER — MIDAZOLAM HYDROCHLORIDE 1 MG/ML
INJECTION INTRAMUSCULAR; INTRAVENOUS PRN
Status: DISCONTINUED | OUTPATIENT
Start: 2018-06-08 | End: 2018-06-08 | Stop reason: SDUPTHER

## 2018-06-08 RX ORDER — PROPOFOL 10 MG/ML
INJECTION, EMULSION INTRAVENOUS PRN
Status: DISCONTINUED | OUTPATIENT
Start: 2018-06-08 | End: 2018-06-08 | Stop reason: SDUPTHER

## 2018-06-08 RX ORDER — PROPOFOL 10 MG/ML
INJECTION, EMULSION INTRAVENOUS CONTINUOUS PRN
Status: DISCONTINUED | OUTPATIENT
Start: 2018-06-08 | End: 2018-06-08 | Stop reason: SDUPTHER

## 2018-06-08 RX ORDER — SODIUM CHLORIDE 0.9 % (FLUSH) 0.9 %
10 SYRINGE (ML) INJECTION PRN
Status: DISCONTINUED | OUTPATIENT
Start: 2018-06-08 | End: 2018-06-08 | Stop reason: HOSPADM

## 2018-06-08 RX ORDER — SODIUM CHLORIDE, SODIUM LACTATE, POTASSIUM CHLORIDE, CALCIUM CHLORIDE 600; 310; 30; 20 MG/100ML; MG/100ML; MG/100ML; MG/100ML
INJECTION, SOLUTION INTRAVENOUS CONTINUOUS
Status: DISCONTINUED | OUTPATIENT
Start: 2018-06-08 | End: 2018-06-08 | Stop reason: HOSPADM

## 2018-06-08 RX ADMIN — SODIUM CHLORIDE, POTASSIUM CHLORIDE, SODIUM LACTATE AND CALCIUM CHLORIDE: 600; 310; 30; 20 INJECTION, SOLUTION INTRAVENOUS at 07:04

## 2018-06-08 RX ADMIN — PROPOFOL 30 MG: 10 INJECTION, EMULSION INTRAVENOUS at 07:48

## 2018-06-08 RX ADMIN — SODIUM CHLORIDE, POTASSIUM CHLORIDE, SODIUM LACTATE AND CALCIUM CHLORIDE: 600; 310; 30; 20 INJECTION, SOLUTION INTRAVENOUS at 07:33

## 2018-06-08 RX ADMIN — LIDOCAINE HYDROCHLORIDE 50 MG: 10 INJECTION, SOLUTION EPIDURAL; INFILTRATION; INTRACAUDAL; PERINEURAL at 07:36

## 2018-06-08 RX ADMIN — PROPOFOL 50 MG: 10 INJECTION, EMULSION INTRAVENOUS at 07:36

## 2018-06-08 RX ADMIN — PROPOFOL 50 MCG/KG/MIN: 10 INJECTION, EMULSION INTRAVENOUS at 07:37

## 2018-06-08 RX ADMIN — GLYCOPYRROLATE 0.4 MG: 0.2 INJECTION, SOLUTION INTRAMUSCULAR; INTRAVENOUS at 07:42

## 2018-06-08 RX ADMIN — MIDAZOLAM HYDROCHLORIDE 2 MG: 2 INJECTION, SOLUTION INTRAMUSCULAR; INTRAVENOUS at 07:34

## 2018-06-08 ASSESSMENT — PAIN SCALES - GENERAL: PAINLEVEL_OUTOF10: 0

## 2018-06-08 ASSESSMENT — LIFESTYLE VARIABLES: SMOKING_STATUS: 1

## 2018-06-11 LAB — SURGICAL PATHOLOGY REPORT: NORMAL

## 2018-06-14 ENCOUNTER — NURSE ONLY (OUTPATIENT)
Dept: FAMILY MEDICINE CLINIC | Age: 58
End: 2018-06-14

## 2018-06-14 VITALS — SYSTOLIC BLOOD PRESSURE: 128 MMHG | DIASTOLIC BLOOD PRESSURE: 70 MMHG | HEART RATE: 93 BPM

## 2018-06-14 DIAGNOSIS — I10 BENIGN ESSENTIAL HTN: Primary | ICD-10-CM

## 2018-07-10 DIAGNOSIS — I10 BENIGN ESSENTIAL HTN: ICD-10-CM

## 2018-07-10 RX ORDER — AMLODIPINE BESYLATE 5 MG/1
TABLET ORAL
Qty: 90 TABLET | Refills: 1 | Status: SHIPPED | OUTPATIENT
Start: 2018-07-10 | End: 2019-04-03 | Stop reason: SDUPTHER

## 2018-07-10 NOTE — TELEPHONE ENCOUNTER
Health Maintenance   Topic Date Due    Shingles Vaccine (1 of 2 - 2 Dose Series) 03/21/2008    Low dose CT lung screening  03/21/2013    Cervical cancer screen  02/14/2017    Flu vaccine (1) 09/01/2018    Potassium monitoring  02/22/2019    Creatinine monitoring  02/22/2019    Breast cancer screen  02/26/2020    DTaP/Tdap/Td vaccine (2 - Td) 02/08/2023    Lipid screen  02/22/2023    Colon cancer screen colonoscopy  06/08/2023    Pneumococcal med risk  Completed    Hepatitis C screen  Completed    HIV screen  Completed             (applicable per patient's age: Cancer Screenings, Depression Screening, Fall Risk Screening, Immunizations)    LDL Cholesterol (mg/dL)   Date Value   02/22/2018 120     AST (U/L)   Date Value   02/22/2018 19     ALT (U/L)   Date Value   02/22/2018 14     BUN (mg/dL)   Date Value   02/22/2018 14      (goal A1C is < 7)   (goal LDL is <100) need 30-50% reduction from baseline     BP Readings from Last 3 Encounters:   06/14/18 128/70   06/08/18 116/68   06/08/18 133/76    (goal /80)      All Future Testing planned in [facility]:  Lab Frequency Next Occurrence   COLONOSCOPY W/ OR W/O BIOPSY Once 06/30/2018       Next Visit Date:  No future appointments.          Patient Active Problem List:     Hypercholesteremia     GERD (gastroesophageal reflux disease)     Benign essential HTN     Postmenopausal symptoms     Seasonal allergies

## 2018-07-30 ENCOUNTER — TELEPHONE (OUTPATIENT)
Dept: FAMILY MEDICINE CLINIC | Age: 58
End: 2018-07-30

## 2018-07-30 DIAGNOSIS — K21.9 GASTROESOPHAGEAL REFLUX DISEASE WITHOUT ESOPHAGITIS: ICD-10-CM

## 2018-07-30 RX ORDER — OMEPRAZOLE 20 MG/1
20 CAPSULE, DELAYED RELEASE ORAL DAILY
Qty: 90 CAPSULE | Refills: 1 | Status: SHIPPED | OUTPATIENT
Start: 2018-07-30 | End: 2021-03-02 | Stop reason: SDUPTHER

## 2018-10-12 ENCOUNTER — NURSE ONLY (OUTPATIENT)
Dept: FAMILY MEDICINE CLINIC | Age: 58
End: 2018-10-12
Payer: COMMERCIAL

## 2018-10-12 DIAGNOSIS — Z23 NEED FOR INFLUENZA VACCINATION: Primary | ICD-10-CM

## 2018-10-12 PROCEDURE — 90471 IMMUNIZATION ADMIN: CPT | Performed by: INTERNAL MEDICINE

## 2018-10-12 PROCEDURE — 90686 IIV4 VACC NO PRSV 0.5 ML IM: CPT | Performed by: INTERNAL MEDICINE

## 2018-10-12 NOTE — PROGRESS NOTES
Vaccine Information Sheet, \"Influenza - Inactivated\"  given to Darius Nath, or parent/legal guardian of  Darius Nath and verbalized understanding. Patient responses:    Have you ever had a reaction to a flu vaccine? No  Are you able to eat eggs without adverse effects? Yes  Do you have any current illness? No  Have you ever had Guillian Hull Syndrome? No    Flu vaccine given per order. Please see immunization tab.

## 2019-02-04 ENCOUNTER — OFFICE VISIT (OUTPATIENT)
Dept: FAMILY MEDICINE CLINIC | Age: 59
End: 2019-02-04

## 2019-02-04 VITALS
HEART RATE: 91 BPM | WEIGHT: 146.8 LBS | TEMPERATURE: 97.8 F | OXYGEN SATURATION: 96 % | SYSTOLIC BLOOD PRESSURE: 130 MMHG | BODY MASS INDEX: 29.65 KG/M2 | DIASTOLIC BLOOD PRESSURE: 88 MMHG

## 2019-02-04 DIAGNOSIS — J10.1 INFLUENZA A: Primary | ICD-10-CM

## 2019-02-04 PROCEDURE — 99212 OFFICE O/P EST SF 10 MIN: CPT | Performed by: NURSE PRACTITIONER

## 2019-02-04 RX ORDER — OSELTAMIVIR PHOSPHATE 75 MG/1
75 CAPSULE ORAL 2 TIMES DAILY
Qty: 10 CAPSULE | Refills: 0 | Status: SHIPPED | OUTPATIENT
Start: 2019-02-04 | End: 2019-02-09

## 2019-02-04 ASSESSMENT — ENCOUNTER SYMPTOMS
RHINORRHEA: 1
COUGH: 1
VOICE CHANGE: 0
CHEST TIGHTNESS: 0
SHORTNESS OF BREATH: 0
SORE THROAT: 1
TROUBLE SWALLOWING: 0
EYES NEGATIVE: 1
SINUS PRESSURE: 1

## 2019-02-04 ASSESSMENT — PATIENT HEALTH QUESTIONNAIRE - PHQ9
SUM OF ALL RESPONSES TO PHQ QUESTIONS 1-9: 0
SUM OF ALL RESPONSES TO PHQ9 QUESTIONS 1 & 2: 0
1. LITTLE INTEREST OR PLEASURE IN DOING THINGS: 0
SUM OF ALL RESPONSES TO PHQ QUESTIONS 1-9: 0
2. FEELING DOWN, DEPRESSED OR HOPELESS: 0

## 2019-03-10 DIAGNOSIS — J30.2 SEASONAL ALLERGIC RHINITIS: ICD-10-CM

## 2019-03-10 DIAGNOSIS — N95.9 POSTMENOPAUSAL SYMPTOMS: ICD-10-CM

## 2019-03-10 DIAGNOSIS — I10 BENIGN ESSENTIAL HTN: ICD-10-CM

## 2019-03-10 DIAGNOSIS — E78.2 MIXED HYPERCHOLESTEROLEMIA AND HYPERTRIGLYCERIDEMIA: ICD-10-CM

## 2019-03-11 RX ORDER — ATORVASTATIN CALCIUM 40 MG/1
40 TABLET, FILM COATED ORAL DAILY
Qty: 90 TABLET | Refills: 1 | Status: SHIPPED | OUTPATIENT
Start: 2019-03-11 | End: 2020-02-28

## 2019-03-11 RX ORDER — FLUTICASONE PROPIONATE 50 MCG
SPRAY, SUSPENSION (ML) NASAL
Qty: 1 BOTTLE | Refills: 3 | Status: SHIPPED | OUTPATIENT
Start: 2019-03-11 | End: 2020-07-27 | Stop reason: SDUPTHER

## 2019-03-11 RX ORDER — HYDROCHLOROTHIAZIDE 25 MG/1
TABLET ORAL
Qty: 90 TABLET | Refills: 1 | Status: SHIPPED | OUTPATIENT
Start: 2019-03-11 | End: 2020-07-06 | Stop reason: SDUPTHER

## 2019-04-03 DIAGNOSIS — I10 BENIGN ESSENTIAL HTN: ICD-10-CM

## 2019-04-04 RX ORDER — AMLODIPINE BESYLATE 5 MG/1
TABLET ORAL
Qty: 90 TABLET | Refills: 1 | Status: SHIPPED | OUTPATIENT
Start: 2019-04-04 | End: 2021-03-02 | Stop reason: SDUPTHER

## 2019-04-04 NOTE — TELEPHONE ENCOUNTER
Health Maintenance   Topic Date Due    Shingles Vaccine (1 of 2) 03/21/2008    Low dose CT lung screening  03/21/2013    Cervical cancer screen  02/14/2017    Potassium monitoring  02/22/2019    Creatinine monitoring  02/22/2019    Breast cancer screen  02/26/2020    DTaP/Tdap/Td vaccine (2 - Td) 02/08/2023    Lipid screen  02/22/2023    Colon cancer screen colonoscopy  06/08/2023    Flu vaccine  Completed    Pneumococcal 0-64 years at Risk Vaccine  Completed    Hepatitis C screen  Completed    HIV screen  Completed             (applicable per patient's age: Cancer Screenings, Depression Screening, Fall Risk Screening, Immunizations)    LDL Cholesterol (mg/dL)   Date Value   02/22/2018 120     AST (U/L)   Date Value   02/22/2018 19     ALT (U/L)   Date Value   02/22/2018 14     BUN (mg/dL)   Date Value   02/22/2018 14      (goal A1C is < 7)   (goal LDL is <100) need 30-50% reduction from baseline     BP Readings from Last 3 Encounters:   02/04/19 130/88   06/14/18 128/70   06/08/18 116/68    (goal /80)      All Future Testing planned in [facility]:  Lab Frequency Next Occurrence       Next Visit Date:  No future appointments.          Patient Active Problem List:     Hypercholesteremia     GERD (gastroesophageal reflux disease)     Benign essential HTN     Postmenopausal symptoms     Seasonal allergies

## 2020-02-28 ENCOUNTER — HOSPITAL ENCOUNTER (OUTPATIENT)
Dept: GENERAL RADIOLOGY | Age: 60
Discharge: HOME | End: 2020-03-01
Payer: COMMERCIAL

## 2020-02-28 ENCOUNTER — HOSPITAL ENCOUNTER (OUTPATIENT)
Age: 60
Discharge: HOME | End: 2020-02-28
Payer: COMMERCIAL

## 2020-02-28 ENCOUNTER — HOSPITAL ENCOUNTER (OUTPATIENT)
Age: 60
Discharge: HOME | End: 2020-03-01
Payer: COMMERCIAL

## 2020-02-28 ENCOUNTER — OFFICE VISIT (OUTPATIENT)
Dept: FAMILY MEDICINE CLINIC | Age: 60
End: 2020-02-28
Payer: COMMERCIAL

## 2020-02-28 VITALS
BODY MASS INDEX: 27.88 KG/M2 | SYSTOLIC BLOOD PRESSURE: 132 MMHG | HEIGHT: 60 IN | WEIGHT: 142 LBS | DIASTOLIC BLOOD PRESSURE: 86 MMHG

## 2020-02-28 PROBLEM — E06.3 HASHIMOTO'S THYROIDITIS: Status: ACTIVE | Noted: 2019-11-07

## 2020-02-28 LAB
THYROXINE, FREE: 1.52 NG/DL (ref 0.93–1.7)
TSH SERPL DL<=0.05 MIU/L-ACNC: 2.87 MIU/L (ref 0.3–5)

## 2020-02-28 PROCEDURE — 73630 X-RAY EXAM OF FOOT: CPT

## 2020-02-28 PROCEDURE — 3017F COLORECTAL CA SCREEN DOC REV: CPT | Performed by: INTERNAL MEDICINE

## 2020-02-28 PROCEDURE — 4004F PT TOBACCO SCREEN RCVD TLK: CPT | Performed by: INTERNAL MEDICINE

## 2020-02-28 PROCEDURE — 84439 ASSAY OF FREE THYROXINE: CPT

## 2020-02-28 PROCEDURE — G8427 DOCREV CUR MEDS BY ELIG CLIN: HCPCS | Performed by: INTERNAL MEDICINE

## 2020-02-28 PROCEDURE — G8484 FLU IMMUNIZE NO ADMIN: HCPCS | Performed by: INTERNAL MEDICINE

## 2020-02-28 PROCEDURE — G8419 CALC BMI OUT NRM PARAM NOF/U: HCPCS | Performed by: INTERNAL MEDICINE

## 2020-02-28 PROCEDURE — 36415 COLL VENOUS BLD VENIPUNCTURE: CPT

## 2020-02-28 PROCEDURE — 99203 OFFICE O/P NEW LOW 30 MIN: CPT | Performed by: INTERNAL MEDICINE

## 2020-02-28 PROCEDURE — 84443 ASSAY THYROID STIM HORMONE: CPT

## 2020-02-28 RX ORDER — PREDNISONE 20 MG/1
TABLET ORAL
Qty: 15 TABLET | Refills: 0 | Status: SHIPPED | OUTPATIENT
Start: 2020-02-28 | End: 2020-03-09

## 2020-02-28 RX ORDER — LEVOTHYROXINE SODIUM 0.03 MG/1
25 TABLET ORAL
COMMUNITY
Start: 2019-11-07 | End: 2021-03-02 | Stop reason: SDUPTHER

## 2020-02-28 RX ORDER — ATORVASTATIN CALCIUM 80 MG/1
40 TABLET, FILM COATED ORAL
COMMUNITY
Start: 2019-11-13 | End: 2021-03-02 | Stop reason: SDUPTHER

## 2020-02-28 SDOH — ECONOMIC STABILITY: TRANSPORTATION INSECURITY
IN THE PAST 12 MONTHS, HAS THE LACK OF TRANSPORTATION KEPT YOU FROM MEDICAL APPOINTMENTS OR FROM GETTING MEDICATIONS?: NO

## 2020-02-28 SDOH — ECONOMIC STABILITY: FOOD INSECURITY: WITHIN THE PAST 12 MONTHS, YOU WORRIED THAT YOUR FOOD WOULD RUN OUT BEFORE YOU GOT MONEY TO BUY MORE.: NEVER TRUE

## 2020-02-28 SDOH — ECONOMIC STABILITY: FOOD INSECURITY: WITHIN THE PAST 12 MONTHS, THE FOOD YOU BOUGHT JUST DIDN'T LAST AND YOU DIDN'T HAVE MONEY TO GET MORE.: NEVER TRUE

## 2020-02-28 SDOH — ECONOMIC STABILITY: INCOME INSECURITY: HOW HARD IS IT FOR YOU TO PAY FOR THE VERY BASICS LIKE FOOD, HOUSING, MEDICAL CARE, AND HEATING?: NOT HARD AT ALL

## 2020-02-28 SDOH — ECONOMIC STABILITY: TRANSPORTATION INSECURITY
IN THE PAST 12 MONTHS, HAS LACK OF TRANSPORTATION KEPT YOU FROM MEETINGS, WORK, OR FROM GETTING THINGS NEEDED FOR DAILY LIVING?: NO

## 2020-02-28 ASSESSMENT — PATIENT HEALTH QUESTIONNAIRE - PHQ9
SUM OF ALL RESPONSES TO PHQ9 QUESTIONS 1 & 2: 0
1. LITTLE INTEREST OR PLEASURE IN DOING THINGS: 0
2. FEELING DOWN, DEPRESSED OR HOPELESS: 0
SUM OF ALL RESPONSES TO PHQ QUESTIONS 1-9: 0
SUM OF ALL RESPONSES TO PHQ QUESTIONS 1-9: 0

## 2020-02-28 ASSESSMENT — ENCOUNTER SYMPTOMS
SHORTNESS OF BREATH: 0
RHINORRHEA: 0
BLOOD IN STOOL: 0
COUGH: 0
DIARRHEA: 0
SORE THROAT: 0
CONSTIPATION: 0
ABDOMINAL PAIN: 0
CHEST TIGHTNESS: 0
NAUSEA: 0

## 2020-02-28 NOTE — PROGRESS NOTES
Value               Date                       NA                       134 (L)             02/22/2018                 K                        3.9                 02/22/2018                 CL                       93 (L)              02/22/2018                 CO2                      29                  02/22/2018                 BUN                      14                  02/22/2018                 CREATININE               0.68                02/22/2018                 GLUCOSE                  92                  02/22/2018                 CALCIUM                  9.5                 02/22/2018                 PROT                     7.8                 02/22/2018                 LABALBU                  4.5                 02/22/2018                 BILITOT                  0.70                02/22/2018                 ALKPHOS                  87                  02/22/2018                 AST                      19                  02/22/2018                 ALT                      14                  02/22/2018                 LABGLOM                  >60                 02/22/2018                 GFRAA                    >60                 02/22/2018              No results found for: LABA1C  No results found for: EAG    Lab Results       Component                Value               Date                       CHOL                     220 (H)             02/22/2018                 CHOL                     210 (H)             02/16/2017                 CHOL                     200 (H)             08/16/2016            Lab Results       Component                Value               Date                       TRIG                     205 (H)             02/22/2018                 TRIG                     112                 02/16/2017                 TRIG                     197 (H)             08/16/2016            Lab Results       Component                Value               Date HDL                      59                  02/22/2018                 HDL                      59                  02/16/2017                 HDL                      61                  08/16/2016            Lab Results       Component                Value               Date                       LDLCHOLESTEROL           120                 02/22/2018                 LDLCHOLESTEROL           129                 02/16/2017                 LDLCHOLESTEROL           100                 08/16/2016            Lab Results       Component                Value               Date                       VLDL                     NOT REPORTED        02/22/2018                 VLDL                     22                  02/16/2017                 VLDL                     39 (H)              08/16/2016            Lab Results       Component                Value               Date                       CHOLHDLRATIO             3.7                 02/22/2018                 CHOLHDLRATIO             3.6                 02/16/2017                 CHOLHDLRATIO             3.3                 08/16/2016                            Hypertension   This is a chronic problem. The current episode started more than 1 year ago. The problem is unchanged. The problem is controlled. Associated symptoms include neck pain. Pertinent negatives include no chest pain, palpitations or shortness of breath. Past treatments include calcium channel blockers. The current treatment provides significant improvement. There are no compliance problems. There is no history of angina. Hyperlipidemia   This is a chronic problem. The current episode started more than 1 year ago. The problem is controlled. Recent lipid tests were reviewed and are normal. Pertinent negatives include no chest pain, myalgias or shortness of breath. Current antihyperlipidemic treatment includes statins. The current treatment provides significant improvement of lipids. There are no compliance problems. Gastroesophageal Reflux   She reports no abdominal pain, no chest pain, no coughing, no nausea or no sore throat. This is a chronic problem. The current episode started more than 1 year ago. The problem occurs rarely. She has tried a PPI for the symptoms. The treatment provided significant relief. Review of Systems   Constitutional: Negative. HENT: Negative for congestion, ear pain, rhinorrhea, sneezing and sore throat. Eyes: Negative for visual disturbance. Respiratory: Negative for cough, chest tightness and shortness of breath. Cardiovascular: Negative for chest pain and palpitations. Gastrointestinal: Negative for abdominal pain, blood in stool, constipation, diarrhea and nausea. Genitourinary: Negative for difficulty urinating, dysuria, frequency, menstrual problem and urgency. Musculoskeletal: Positive for neck pain. Negative for arthralgias, joint swelling and myalgias. Foot pain. Skin: Negative. Neurological: Negative for syncope. Psychiatric/Behavioral: Negative. Objective:   Physical Exam  Constitutional:       Appearance: She is well-developed. HENT:      Head: Atraumatic. Eyes:      Conjunctiva/sclera: Conjunctivae normal.   Neck:      Musculoskeletal: Normal range of motion and neck supple. Comments: Pain over the pericervical muscles. Cardiovascular:      Rate and Rhythm: Normal rate and regular rhythm. Heart sounds: Normal heart sounds. Pulmonary:      Effort: Pulmonary effort is normal.      Breath sounds: Normal breath sounds. Abdominal:      Palpations: Abdomen is soft. Tenderness: There is no abdominal tenderness. Musculoskeletal: Normal range of motion. Comments: Pain with palpation of the foot just proximal to the digits. Lymphadenopathy:      Cervical: No cervical adenopathy. Skin:     Findings: No rash. Neurological:      Mental Status: She is alert.    Psychiatric: without Reflex; Future  - T4, Free; Future    [facility] was instructed to follow up in the clinic in 6 months for check up or as needed with any medical issues.                   Bree Gutierrez MD

## 2020-02-28 NOTE — PATIENT INSTRUCTIONS
cold water, can also help reduce swelling. But because heat alone may make pain and swelling worse, end a contrast bath with a soak in cold water. · Wear a night splint if your doctor suggests it. A night splint holds your foot with the toes pointed up and the foot and ankle at a 90-degree angle. This position gives the bottom of your foot a constant, gentle stretch. · Do simple exercises such as calf stretches and towel stretches 2 to 3 times each day, especially when you first get up in the morning. These can help the plantar fascia become more flexible. They also make the muscles that support your arch stronger. Hold these stretches for 15 to 30 seconds per stretch. Repeat 2 to 4 times. ? Stand about 1 foot from a wall. Place the palms of both hands against the wall at chest level. Lean forward against the wall, keeping one leg with the knee straight and heel on the ground while bending the knee of the other leg.  ? Sit down on the floor or a mat with your feet stretched in front of you. Roll up a towel lengthwise, and loop it over the ball of your foot. Holding the towel at both ends, gently pull the towel toward you to stretch your foot. · Wear shoes with good arch support. Athletic shoes or shoes with a well-cushioned sole are good choices. · Try heel cups or shoe inserts (orthotics) to help cushion your heel. You can buy these at many shoe stores. · Put on your shoes as soon as you get out of bed. Going barefoot or wearing slippers may make your pain worse. · Reach and stay at a good weight for your height. This puts less strain on your feet. When should you call for help?   Call your doctor now or seek immediate medical care if:    · You have heel pain with fever, redness, or warmth in your heel.     · You cannot put weight on the sore foot.    Watch closely for changes in your health, and be sure to contact your doctor if:    · You have numbness or tingling in your heel.     · Your heel pain lasts be on the floor. Sit back toward that ankle. 4. Hold 15 to 30 seconds. 5. Repeat 2 to 4 times. Switch legs if you are doing this one leg at a time. Plantar fascia self-massage   1. Sit in a chair. 2. Place your affected foot on a firm, tube-shaped object, such as a can or water bottle. 3. Roll your foot back and forth over the object to massage the bottom of your foot. 4. If you want to do ice massage, fill a water bottle about three-fourths of the way full and freeze before using. 5. Continue for 2 to 5 minutes. Bilateral calf stretch (knees straight)   1. Place a book on the floor a few inches from a wall or countertop, and put the balls of your feet on it. Your heels should be on the floor. The book needs to be thick enough so that you can feel a gentle stretch in each calf. If you are not steady on your feet, hold on to a chair, counter, or wall while you do this stretch. 2. Keep your knees straight, and lean forward until you feel a stretch in each calf. 3. To get more stretch, add another book or use a thicker book, such as a phone book, a dictionary, or an encyclopedia. 4. Hold the stretch for at least 15 to 30 seconds. 5. Repeat 2 to 4 times. Bilateral calf stretch (knees bent)   1. Place a book on the floor a few inches from a wall or countertop, and put the balls of your feet on it. Your heels should be on the floor. The book needs to be thick enough so that you can feel a gentle stretch in each calf. If you are not steady on your feet, hold on to a chair, counter, or wall while you do this stretch. 2. Bend your knees, and lean forward until you feel a stretch in each calf. 3. To get more stretch, add another book or use a thicker book, such as a phone book, a dictionary, or an encyclopedia. 4. Hold the stretch for at least 15 to 30 seconds. 5. Repeat 2 to 4 times. [place] pick-ups   1.  Put some marbles on the floor next to a cup.  2. Sit down, and use the toes of your affected family. If you cannot score us a very good on any question, please call the office to discuss how we could have made your experience a very good one. Thank you. 1. Other screening mammogram  Set up for mammogram.  - TAMIA DIGITAL SCREEN W CAD BILATERAL; Future    2. Neck pain  Hold Lipitor to see if this helps with neck pain. If so, start back to see if it recurs. Take the prednisone taper as directed on the prescription. The prednisone is very bitter so swallow the tablets quickly to prevent  dissolving in the mouth. After taking, don't lay  down for 2 hours to help prevent reflux. - predniSONE (DELTASONE) 20 MG tablet; 3 tablets daily x 3 days then 2 tablets daily x 2 days then 1 tablet daily x 2 days. Dispense: 15 tablet; Refill: 0    3. Foot pain, right  Xray the right foot. Refer to Dr. Jose C Ashby. Take the prednisone taper as directed on the prescription. The prednisone is very bitter so swallow the tablets quickly to prevent  dissolving in the mouth. After taking, don't lay  down for 2 hours to help prevent reflux.    - XR FOOT LEFT (MIN 3 VIEWS); Future  - Amanda Pichardo DPM, Podiatry, [facility]  - predniSONE (DELTASONE) 20 MG tablet; 3 tablets daily x 3 days then 2 tablets daily x 2 days then 1 tablet daily x 2 days. Dispense: 15 tablet; Refill: 0    4. Benign essential HTN  Controlled on the current medication. 5. Gastroesophageal reflux disease without esophagitis  Controlled on Prilosec. 6. Hypercholesteremia  Controlled on Lipitor. 7. Acquired hypothyroidism  TSH and free T4.  - TSH without Reflex; Future  - T4, Free; Future    Deven Garrettingris was instructed to follow up in the clinic in 6 months for check up or as needed with any medical issues.

## 2020-03-04 ENCOUNTER — HOSPITAL ENCOUNTER (OUTPATIENT)
Dept: MAMMOGRAPHY | Age: 60
Discharge: HOME | End: 2020-03-06
Payer: COMMERCIAL

## 2020-03-04 PROCEDURE — 77067 SCR MAMMO BI INCL CAD: CPT

## 2020-07-06 RX ORDER — HYDROCHLOROTHIAZIDE 25 MG/1
TABLET ORAL
Qty: 90 TABLET | Refills: 1 | Status: SHIPPED | OUTPATIENT
Start: 2020-07-06 | End: 2020-09-24 | Stop reason: SDUPTHER

## 2020-07-27 RX ORDER — FLUTICASONE PROPIONATE 50 MCG
SPRAY, SUSPENSION (ML) NASAL
Qty: 1 BOTTLE | Refills: 5 | Status: SHIPPED | OUTPATIENT
Start: 2020-07-27 | End: 2021-03-02 | Stop reason: SDUPTHER

## 2020-07-27 NOTE — TELEPHONE ENCOUNTER
Health Maintenance   Topic Date Due    Shingles Vaccine (1 of 2) 03/21/2008    Low dose CT lung screening  03/21/2013    Cervical cancer screen  02/14/2017    Lipid screen  02/22/2019    Potassium monitoring  02/22/2019    Creatinine monitoring  02/22/2019    Flu vaccine (1) 09/01/2020    Breast cancer screen  03/04/2022    DTaP/Tdap/Td vaccine (2 - Td) 02/08/2023    Colon cancer screen colonoscopy  06/08/2023    Pneumococcal 0-64 years Vaccine  Completed    Hepatitis C screen  Completed    HIV screen  Completed    Hepatitis A vaccine  Aged Out    Hepatitis B vaccine  Aged Out    Hib vaccine  Aged Out    Meningococcal (ACWY) vaccine  Aged Out             (applicable per patient's age: Cancer Screenings, Depression Screening, Fall Risk Screening, Immunizations)    LDL Cholesterol (mg/dL)   Date Value   02/22/2018 120     AST (U/L)   Date Value   02/22/2018 19     ALT (U/L)   Date Value   02/22/2018 14     BUN (mg/dL)   Date Value   02/22/2018 14      (goal A1C is < 7)   (goal LDL is <100) need 30-50% reduction from baseline     BP Readings from Last 3 Encounters:   02/28/20 132/86   02/04/19 130/88   06/14/18 128/70    (goal /80)      All Future Testing planned in [facility]:  Lab Frequency Next Occurrence       Next Visit Date:  Future Appointments   Date Time Provider Shae Isaac   8/31/2020 10:30 AM MD Kenia Snow            Patient Active Problem List:     Hypercholesteremia     GERD (gastroesophageal reflux disease)     Benign essential HTN     Postmenopausal symptoms     Seasonal allergies     Hashimoto's thyroiditis

## 2020-08-28 ENCOUNTER — OFFICE VISIT (OUTPATIENT)
Dept: FAMILY MEDICINE CLINIC | Age: 60
End: 2020-08-28
Payer: COMMERCIAL

## 2020-08-28 VITALS
TEMPERATURE: 97.5 F | HEIGHT: 60 IN | DIASTOLIC BLOOD PRESSURE: 85 MMHG | WEIGHT: 137 LBS | HEART RATE: 89 BPM | SYSTOLIC BLOOD PRESSURE: 138 MMHG | BODY MASS INDEX: 26.9 KG/M2

## 2020-08-28 PROCEDURE — G8427 DOCREV CUR MEDS BY ELIG CLIN: HCPCS | Performed by: INTERNAL MEDICINE

## 2020-08-28 PROCEDURE — G8419 CALC BMI OUT NRM PARAM NOF/U: HCPCS | Performed by: INTERNAL MEDICINE

## 2020-08-28 PROCEDURE — 4004F PT TOBACCO SCREEN RCVD TLK: CPT | Performed by: INTERNAL MEDICINE

## 2020-08-28 PROCEDURE — 3017F COLORECTAL CA SCREEN DOC REV: CPT | Performed by: INTERNAL MEDICINE

## 2020-08-28 PROCEDURE — 99213 OFFICE O/P EST LOW 20 MIN: CPT | Performed by: INTERNAL MEDICINE

## 2020-08-28 RX ORDER — METRONIDAZOLE 500 MG/1
500 TABLET ORAL 3 TIMES DAILY
Qty: 30 TABLET | Refills: 0 | Status: SHIPPED | OUTPATIENT
Start: 2020-08-28 | End: 2020-09-07

## 2020-08-28 RX ORDER — CIPROFLOXACIN 500 MG/1
500 TABLET, FILM COATED ORAL 2 TIMES DAILY
Qty: 20 TABLET | Refills: 0 | Status: SHIPPED | OUTPATIENT
Start: 2020-08-28 | End: 2021-03-25 | Stop reason: SDUPTHER

## 2020-08-28 ASSESSMENT — ENCOUNTER SYMPTOMS
SHORTNESS OF BREATH: 0
SORE THROAT: 0
BLOOD IN STOOL: 0
COUGH: 0
NAUSEA: 0
ABDOMINAL PAIN: 1
CHEST TIGHTNESS: 0
CONSTIPATION: 1
DIARRHEA: 0
RHINORRHEA: 0
VOMITING: 0

## 2020-08-28 NOTE — PROGRESS NOTES
Subjective:      Patient ID: Dave Og is a 56 y.o. female. Abdominal Pain   This is a new problem. The current episode started yesterday. The onset quality is sudden. The problem occurs constantly. The problem has been gradually worsening. The pain is located in the LLQ. The quality of the pain is aching. The abdominal pain does not radiate. Associated symptoms include constipation. Pertinent negatives include no arthralgias, diarrhea, dysuria, fever, frequency, myalgias, nausea or vomiting. The pain is aggravated by movement. The pain is relieved by nothing. Treatments tried: Miralax. The treatment provided mild relief. Diverticultis       Review of Systems   Constitutional: Negative. Negative for fever. HENT: Negative for congestion, ear pain, rhinorrhea, sneezing and sore throat. Eyes: Negative for visual disturbance. Respiratory: Negative for cough, chest tightness and shortness of breath. Cardiovascular: Negative for chest pain and palpitations. Gastrointestinal: Positive for abdominal pain and constipation. Negative for blood in stool, diarrhea, nausea and vomiting. Genitourinary: Negative for difficulty urinating, dysuria, frequency, menstrual problem and urgency. Musculoskeletal: Negative for arthralgias, joint swelling, myalgias and neck pain. Skin: Negative. Neurological: Negative for syncope. Psychiatric/Behavioral: Negative. Objective:   Physical Exam  Constitutional:       Appearance: She is well-developed. HENT:      Head: Atraumatic. Eyes:      Conjunctiva/sclera: Conjunctivae normal.   Neck:      Musculoskeletal: Normal range of motion and neck supple. Cardiovascular:      Rate and Rhythm: Normal rate and regular rhythm. Heart sounds: Normal heart sounds. Pulmonary:      Effort: Pulmonary effort is normal.      Breath sounds: Normal breath sounds. Abdominal:      Palpations: Abdomen is soft. Tenderness: There is abdominal tenderness.

## 2020-08-28 NOTE — PATIENT INSTRUCTIONS
SURVEY:    You may be receiving a survey from [employer] regarding your visit today. Please complete the survey to enable us to provide the highest quality of care to you and your family. If you cannot score us a very good on any question, please call the office to discuss how we could have made your experience a very good one. Thank you. 1. Diverticulitis  Take Cipro 500 mg two times a day x 10 days. Take Flagyl 500 mg three times a day x 10 days. No Alcohol with Flagyl. Danilo Suazo is instructed to return to the clinic if the symptoms continue or worsen. Danilo Suazo  was also instructed to go to the emergency room department if the symptoms significantly worsen before an appointment can be made. - ciprofloxacin (CIPRO) 500 MG tablet; Take 1 tablet by mouth 2 times daily for 10 days  Dispense: 20 tablet; Refill: 0  - metroNIDAZOLE (FLAGYL) 500 MG tablet; Take 1 tablet by mouth 3 times daily for 10 days  Dispense: 30 tablet; Refill: 0    2. Benign essential HTN    - Comprehensive Metabolic Panel; Future    3. Hypercholesteremia  Labs before appt on Monday. - Comprehensive Metabolic Panel; Future  - Lipid Panel;  Future

## 2020-08-29 ENCOUNTER — HOSPITAL ENCOUNTER (OUTPATIENT)
Age: 60
Discharge: HOME | End: 2020-08-29
Payer: COMMERCIAL

## 2020-08-29 LAB
ALBUMIN SERPL-MCNC: 4.5 G/DL (ref 3.5–5.2)
ALBUMIN/GLOBULIN RATIO: ABNORMAL (ref 1–2.5)
ALP BLD-CCNC: 108 U/L (ref 35–104)
ALT SERPL-CCNC: 15 U/L (ref 5–33)
ANION GAP SERPL CALCULATED.3IONS-SCNC: 12 MMOL/L (ref 9–17)
AST SERPL-CCNC: 17 U/L
BILIRUB SERPL-MCNC: 0.67 MG/DL (ref 0.3–1.2)
BUN BLDV-MCNC: 7 MG/DL (ref 8–23)
BUN/CREAT BLD: 9 (ref 9–20)
CALCIUM SERPL-MCNC: 10 MG/DL (ref 8.6–10.4)
CHLORIDE BLD-SCNC: 97 MMOL/L (ref 98–107)
CHOLESTEROL/HDL RATIO: 2.5
CHOLESTEROL: 145 MG/DL
CO2: 26 MMOL/L (ref 20–31)
CREAT SERPL-MCNC: 0.75 MG/DL (ref 0.5–0.9)
GFR AFRICAN AMERICAN: >60 ML/MIN
GFR NON-AFRICAN AMERICAN: >60 ML/MIN
GFR SERPL CREATININE-BSD FRML MDRD: ABNORMAL ML/MIN/{1.73_M2}
GFR SERPL CREATININE-BSD FRML MDRD: ABNORMAL ML/MIN/{1.73_M2}
GLUCOSE BLD-MCNC: 111 MG/DL (ref 70–99)
HDLC SERPL-MCNC: 57 MG/DL
LDL CHOLESTEROL: 67 MG/DL (ref 0–130)
PATIENT FASTING?: YES
POTASSIUM SERPL-SCNC: 3.2 MMOL/L (ref 3.7–5.3)
SODIUM BLD-SCNC: 135 MMOL/L (ref 135–144)
TOTAL PROTEIN: 8.2 G/DL (ref 6.4–8.3)
TRIGL SERPL-MCNC: 104 MG/DL
VLDLC SERPL CALC-MCNC: NORMAL MG/DL (ref 1–30)

## 2020-08-29 PROCEDURE — 80053 COMPREHEN METABOLIC PANEL: CPT

## 2020-08-29 PROCEDURE — 80061 LIPID PANEL: CPT

## 2020-08-29 PROCEDURE — 36415 COLL VENOUS BLD VENIPUNCTURE: CPT

## 2020-08-31 ENCOUNTER — OFFICE VISIT (OUTPATIENT)
Dept: FAMILY MEDICINE CLINIC | Age: 60
End: 2020-08-31
Payer: COMMERCIAL

## 2020-08-31 VITALS
DIASTOLIC BLOOD PRESSURE: 80 MMHG | SYSTOLIC BLOOD PRESSURE: 139 MMHG | WEIGHT: 141 LBS | HEIGHT: 60 IN | HEART RATE: 83 BPM | TEMPERATURE: 97.3 F | BODY MASS INDEX: 27.68 KG/M2

## 2020-08-31 PROBLEM — E03.9 ACQUIRED HYPOTHYROIDISM: Status: ACTIVE | Noted: 2020-08-31

## 2020-08-31 LAB — HBA1C MFR BLD: 5.7 %

## 2020-08-31 PROCEDURE — 4004F PT TOBACCO SCREEN RCVD TLK: CPT | Performed by: INTERNAL MEDICINE

## 2020-08-31 PROCEDURE — G8419 CALC BMI OUT NRM PARAM NOF/U: HCPCS | Performed by: INTERNAL MEDICINE

## 2020-08-31 PROCEDURE — 3017F COLORECTAL CA SCREEN DOC REV: CPT | Performed by: INTERNAL MEDICINE

## 2020-08-31 PROCEDURE — 83036 HEMOGLOBIN GLYCOSYLATED A1C: CPT | Performed by: INTERNAL MEDICINE

## 2020-08-31 PROCEDURE — 99214 OFFICE O/P EST MOD 30 MIN: CPT | Performed by: INTERNAL MEDICINE

## 2020-08-31 PROCEDURE — G8427 DOCREV CUR MEDS BY ELIG CLIN: HCPCS | Performed by: INTERNAL MEDICINE

## 2020-08-31 RX ORDER — POTASSIUM CHLORIDE 20 MEQ/1
20 TABLET, EXTENDED RELEASE ORAL DAILY
Qty: 90 TABLET | Refills: 1 | Status: SHIPPED | OUTPATIENT
Start: 2020-08-31 | End: 2021-03-02 | Stop reason: SDUPTHER

## 2020-08-31 ASSESSMENT — ENCOUNTER SYMPTOMS
SHORTNESS OF BREATH: 0
CHEST TIGHTNESS: 0
SORE THROAT: 0
NAUSEA: 0
COUGH: 0
DIARRHEA: 0
ABDOMINAL PAIN: 0
RHINORRHEA: 0
CONSTIPATION: 0
BLOOD IN STOOL: 0

## 2020-08-31 NOTE — PROGRESS NOTES
104                 08/29/2020                 TRIG                     205 (H)             02/22/2018                 TRIG                     112                 02/16/2017            Lab Results       Component                Value               Date                       HDL                      57                  08/29/2020                 HDL                      59                  02/22/2018                 HDL                      59                  02/16/2017            Lab Results       Component                Value               Date                       LDLCHOLESTEROL           67                  08/29/2020                 LDLCHOLESTEROL           120                 02/22/2018                 LDLCHOLESTEROL           129                 02/16/2017            Lab Results       Component                Value               Date                       VLDL                     NOT REPORTED        08/29/2020                 VLDL                     NOT REPORTED        02/22/2018                 VLDL                     22                  02/16/2017            Lab Results       Component                Value               Date                       CHOLHDLRATIO             2.5                 08/29/2020                 CHOLHDLRATIO             3.7                 02/22/2018                 CHOLHDLRATIO             3.6                 02/16/2017                              Hypertension   This is a chronic problem. The current episode started more than 1 year ago. The problem is unchanged. The problem is controlled. Pertinent negatives include no chest pain, neck pain, palpitations or shortness of breath. Risk factors for coronary artery disease include dyslipidemia. Past treatments include diuretics. The current treatment provides significant improvement. There are no compliance problems. There is no history of angina. Hyperlipidemia   This is a chronic problem.  The current episode started more than 1 year ago. The problem is controlled. Recent lipid tests were reviewed and are normal. Pertinent negatives include no chest pain, myalgias or shortness of breath. Current antihyperlipidemic treatment includes statins. The current treatment provides significant improvement of lipids. There are no compliance problems. Gastroesophageal Reflux   She reports no abdominal pain, no chest pain, no coughing, no nausea or no sore throat. This is a chronic problem. The current episode started more than 1 year ago. The problem occurs rarely. The problem has been unchanged. Associated symptoms include fatigue. She has tried a PPI for the symptoms. The treatment provided significant relief. Review of Systems   Constitutional: Positive for fatigue. HENT: Negative for congestion, ear pain, rhinorrhea, sneezing and sore throat. Eyes: Negative for visual disturbance. Respiratory: Negative for cough, chest tightness and shortness of breath. Cardiovascular: Negative for chest pain and palpitations. Gastrointestinal: Negative for abdominal pain, blood in stool, constipation, diarrhea and nausea. Genitourinary: Negative for difficulty urinating, dysuria, frequency, menstrual problem and urgency. Musculoskeletal: Negative for arthralgias, joint swelling, myalgias and neck pain. Skin: Negative. Neurological: Negative for syncope. Psychiatric/Behavioral: Negative. Objective:   Physical Exam  Constitutional:       Appearance: She is well-developed. HENT:      Head: Atraumatic. Nose:      Comments: Nasal mucosa red and swollen. Eyes:      Conjunctiva/sclera: Conjunctivae normal.   Neck:      Musculoskeletal: Normal range of motion and neck supple. Cardiovascular:      Rate and Rhythm: Normal rate and regular rhythm. Heart sounds: Normal heart sounds. Pulmonary:      Effort: Pulmonary effort is normal.      Breath sounds: Normal breath sounds.    Abdominal:      Palpations: Abdomen is soft. Tenderness: There is no abdominal tenderness. Musculoskeletal: Normal range of motion. Lymphadenopathy:      Cervical: No cervical adenopathy. Skin:     Findings: No rash. Neurological:      Mental Status: She is alert. Psychiatric:         Behavior: Behavior normal.         Thought Content: Thought content normal.         Assessment:       Diagnosis Orders   1. Benign essential HTN  Comprehensive Metabolic Panel   2. Elevated glucose  POCT glycosylated hemoglobin (Hb A1C)   3. Hypokalemia  potassium chloride (KLOR-CON M) 20 MEQ extended release tablet   4. Gastroesophageal reflux disease without esophagitis     5. Hypercholesteremia  Comprehensive Metabolic Panel    Lipid Panel   6. Fatigue, unspecified type  CBC Auto Differential    TSH without Reflex    T4, Free   7. Acquired hypothyroidism  TSH without Reflex    T4, Free   8. Acute bacterial sinusitis             Plan:      1. Elevated glucose  Hgba1C is normal.  - POCT glycosylated hemoglobin (Hb A1C)    2. Hypokalemia  Add Potassium 20 meq by mouth daily. - potassium chloride (KLOR-CON M) 20 MEQ extended release tablet; Take 1 tablet by mouth daily  Dispense: 90 tablet; Refill: 1    3. Benign essential HTN  Controlled on the current medication.  - Comprehensive Metabolic Panel; Future    4. Gastroesophageal reflux disease without esophagitis  Controlled on Omeprazole. 5. Hypercholesteremia  Controlled on Lipitor. Obtain labs approximately one week prior to the office appointment. Please fast for 12 hours prior to obtaining the labs. Water or black coffee (no cream or sugar) is allowed prior to the the labs. - Comprehensive Metabolic Panel; Future  - Lipid Panel; Future    6. Fatigue, unspecified type  Take Melatonin 5 mg nightly to help with sleep. CBC with next labs. - CBC Auto Differential; Future  - TSH without Reflex; Future  - T4, Free; Future    7.  Acquired hypothyroidism  Thyroid labs in 6 months.  - TSH without Reflex; Future  - T4, Free; Future    8. Acute bacterial sinusitis  On Cipro currently which should treat her infection. Joe Zendejas was instructed to follow up in the clinic in 6 months for check up or as needed with any medical issues.                       Ravinder Chen MD

## 2020-08-31 NOTE — PATIENT INSTRUCTIONS
Survey: You may be receiving a survey from [employer] regarding your visit today. You may get this in the mail, through your MyChart or in your email. Please complete the survey to enable us to provide the highest quality of care to you and your family. Please also, mention our names. If you cannot score us as very good (5 Stars) on any question, please feel free to call the office to discuss how we could have made your experience exceptional.      Thank You! MD Jarad HinesSt. [facility], [address] [place] [address], [address] [place] [address], [facility]    Farheen Parada CMA    1. Elevated glucose  Hgba1C is normal.  - POCT glycosylated hemoglobin (Hb A1C)    2. Hypokalemia  Add Potassium 20 meq by mouth daily. - potassium chloride (KLOR-CON M) 20 MEQ extended release tablet; Take 1 tablet by mouth daily  Dispense: 90 tablet; Refill: 1    3. Benign essential HTN  Controlled on the current medication.  - Comprehensive Metabolic Panel; Future    4. Gastroesophageal reflux disease without esophagitis  Controlled on Omeprazole. 5. Hypercholesteremia  Controlled on Lipitor. Obtain labs approximately one week prior to the office appointment. Please fast for 12 hours prior to obtaining the labs. Water or black coffee (no cream or sugar) is allowed prior to the the labs. - Comprehensive Metabolic Panel; Future  - Lipid Panel; Future    6. Fatigue, unspecified type  CBC with next labs. - CBC Auto Differential; Future  - TSH without Reflex; Future  - T4, Free; Future    7. Acquired hypothyroidism  Thyroid labs in 6 months.  - TSH without Reflex; Future  - T4, Free; Future    8. Acute bacterial sinusitis  On Cipro currently which should treat her infection. Kandi Hoffman was instructed to follow up in the clinic in 6 months for check up or as needed with any medical issues.

## 2020-09-24 RX ORDER — HYDROCHLOROTHIAZIDE 25 MG/1
TABLET ORAL
Qty: 90 TABLET | Refills: 1 | Status: SHIPPED | OUTPATIENT
Start: 2020-09-24 | End: 2021-03-02 | Stop reason: SDUPTHER

## 2020-09-24 NOTE — TELEPHONE ENCOUNTER
Health Maintenance   Topic Date Due    Shingles Vaccine (1 of 2) 03/21/2008    Low dose CT lung screening  03/21/2013    Cervical cancer screen  02/14/2017    Flu vaccine (1) 09/01/2020    TSH testing  02/28/2021    Lipid screen  08/29/2021    Potassium monitoring  08/29/2021    Creatinine monitoring  08/29/2021    A1C test (Diabetic or Prediabetic)  08/31/2021    Breast cancer screen  03/04/2022    DTaP/Tdap/Td vaccine (2 - Td) 02/08/2023    Colon cancer screen colonoscopy  06/08/2023    Pneumococcal 0-64 years Vaccine  Completed    Hepatitis C screen  Completed    HIV screen  Completed    Hepatitis A vaccine  Aged Out    Hepatitis B vaccine  Aged Out    Hib vaccine  Aged Out    Meningococcal (ACWY) vaccine  Aged Out             (applicable per patient's age: Cancer Screenings, Depression Screening, Fall Risk Screening, Immunizations)    Hemoglobin A1C (%)   Date Value   08/31/2020 5.7     LDL Cholesterol (mg/dL)   Date Value   08/29/2020 67     AST (U/L)   Date Value   08/29/2020 17     ALT (U/L)   Date Value   08/29/2020 15     BUN (mg/dL)   Date Value   08/29/2020 7 (L)      (goal A1C is < 7)   (goal LDL is <100) need 30-50% reduction from baseline     BP Readings from Last 3 Encounters:   08/31/20 139/80   08/28/20 138/85   02/28/20 132/86    (goal /80)      All Future Testing planned in [facility]:  Lab Frequency Next Occurrence   Comprehensive Metabolic Panel Once 10/39/9807   Lipid Panel Once 02/28/2021   CBC Auto Differential Once 02/28/2021   TSH without Reflex Once 02/28/2021   T4, Free Once 02/28/2021       Next Visit Date:  Future Appointments   Date Time Provider Shae Isaac   3/2/2021  1:15 PM MD Kenia Hunt Doing            Patient Active Problem List:     Hypercholesteremia     GERD (gastroesophageal reflux disease)     Benign essential HTN     Postmenopausal symptoms     Seasonal allergies     Hashimoto's thyroiditis     Acquired hypothyroidism

## 2021-02-24 ENCOUNTER — HOSPITAL ENCOUNTER (OUTPATIENT)
Age: 61
Discharge: HOME | End: 2021-02-24
Payer: COMMERCIAL

## 2021-02-24 DIAGNOSIS — E03.9 ACQUIRED HYPOTHYROIDISM: ICD-10-CM

## 2021-02-24 DIAGNOSIS — I10 BENIGN ESSENTIAL HTN: ICD-10-CM

## 2021-02-24 DIAGNOSIS — R53.83 FATIGUE, UNSPECIFIED TYPE: ICD-10-CM

## 2021-02-24 DIAGNOSIS — E78.00 HYPERCHOLESTEREMIA: ICD-10-CM

## 2021-02-24 LAB
ABSOLUTE EOS #: 0.1 K/UL (ref 0–0.4)
ABSOLUTE IMMATURE GRANULOCYTE: NORMAL K/UL (ref 0–0.3)
ABSOLUTE LYMPH #: 2.1 K/UL (ref 1–4.8)
ABSOLUTE MONO #: 0.3 K/UL (ref 0–1)
ALBUMIN SERPL-MCNC: 4.4 G/DL (ref 3.5–5.2)
ALBUMIN/GLOBULIN RATIO: ABNORMAL (ref 1–2.5)
ALP BLD-CCNC: 106 U/L (ref 35–104)
ALT SERPL-CCNC: 16 U/L (ref 5–33)
ANION GAP SERPL CALCULATED.3IONS-SCNC: 10 MMOL/L (ref 9–17)
AST SERPL-CCNC: 18 U/L
BASOPHILS # BLD: 1 % (ref 0–2)
BASOPHILS ABSOLUTE: 0 K/UL (ref 0–0.2)
BILIRUB SERPL-MCNC: 0.81 MG/DL (ref 0.3–1.2)
BUN BLDV-MCNC: 14 MG/DL (ref 8–23)
BUN/CREAT BLD: 19 (ref 9–20)
CALCIUM SERPL-MCNC: 10.1 MG/DL (ref 8.6–10.4)
CHLORIDE BLD-SCNC: 99 MMOL/L (ref 98–107)
CHOLESTEROL/HDL RATIO: 2.7
CHOLESTEROL: 189 MG/DL
CO2: 28 MMOL/L (ref 20–31)
CREAT SERPL-MCNC: 0.73 MG/DL (ref 0.5–0.9)
DIFFERENTIAL TYPE: YES
EOSINOPHILS RELATIVE PERCENT: 2 % (ref 0–5)
GFR AFRICAN AMERICAN: >60 ML/MIN
GFR NON-AFRICAN AMERICAN: >60 ML/MIN
GFR SERPL CREATININE-BSD FRML MDRD: ABNORMAL ML/MIN/{1.73_M2}
GFR SERPL CREATININE-BSD FRML MDRD: ABNORMAL ML/MIN/{1.73_M2}
GLUCOSE BLD-MCNC: 97 MG/DL (ref 70–99)
HCT VFR BLD CALC: 43.3 % (ref 36–46)
HDLC SERPL-MCNC: 69 MG/DL
HEMOGLOBIN: 14.8 G/DL (ref 12–16)
IMMATURE GRANULOCYTES: NORMAL %
LDL CHOLESTEROL: 86 MG/DL (ref 0–130)
LYMPHOCYTES # BLD: 34 % (ref 15–40)
MCH RBC QN AUTO: 30 PG (ref 26–34)
MCHC RBC AUTO-ENTMCNC: 34.1 G/DL (ref 31–37)
MCV RBC AUTO: 88 FL (ref 80–100)
MONOCYTES # BLD: 5 % (ref 4–8)
NRBC AUTOMATED: NORMAL PER 100 WBC
PATIENT FASTING?: YES
PDW BLD-RTO: 14.5 % (ref 12.1–15.2)
PLATELET # BLD: 313 K/UL (ref 140–450)
PLATELET ESTIMATE: NORMAL
PMV BLD AUTO: NORMAL FL (ref 6–12)
POTASSIUM SERPL-SCNC: 3.4 MMOL/L (ref 3.7–5.3)
RBC # BLD: 4.92 M/UL (ref 4–5.2)
RBC # BLD: NORMAL 10*6/UL
SEG NEUTROPHILS: 58 % (ref 47–75)
SEGMENTED NEUTROPHILS ABSOLUTE COUNT: 3.6 K/UL (ref 2.5–7)
SODIUM BLD-SCNC: 137 MMOL/L (ref 135–144)
THYROXINE, FREE: 1.3 NG/DL (ref 0.93–1.7)
TOTAL PROTEIN: 7.6 G/DL (ref 6.4–8.3)
TRIGL SERPL-MCNC: 170 MG/DL
TSH SERPL DL<=0.05 MIU/L-ACNC: 4.75 MIU/L (ref 0.3–5)
VLDLC SERPL CALC-MCNC: ABNORMAL MG/DL (ref 1–30)
WBC # BLD: 6.3 K/UL (ref 3.5–11)
WBC # BLD: NORMAL 10*3/UL

## 2021-02-24 PROCEDURE — 36415 COLL VENOUS BLD VENIPUNCTURE: CPT

## 2021-02-24 PROCEDURE — 85025 COMPLETE CBC W/AUTO DIFF WBC: CPT

## 2021-02-24 PROCEDURE — 80061 LIPID PANEL: CPT

## 2021-02-24 PROCEDURE — 84443 ASSAY THYROID STIM HORMONE: CPT

## 2021-02-24 PROCEDURE — 84439 ASSAY OF FREE THYROXINE: CPT

## 2021-02-24 PROCEDURE — 80053 COMPREHEN METABOLIC PANEL: CPT

## 2021-03-02 ENCOUNTER — OFFICE VISIT (OUTPATIENT)
Dept: FAMILY MEDICINE CLINIC | Age: 61
End: 2021-03-02
Payer: COMMERCIAL

## 2021-03-02 VITALS
SYSTOLIC BLOOD PRESSURE: 137 MMHG | BODY MASS INDEX: 28.47 KG/M2 | WEIGHT: 145 LBS | HEIGHT: 60 IN | DIASTOLIC BLOOD PRESSURE: 81 MMHG

## 2021-03-02 DIAGNOSIS — E03.9 ACQUIRED HYPOTHYROIDISM: ICD-10-CM

## 2021-03-02 DIAGNOSIS — N95.9 POSTMENOPAUSAL SYMPTOMS: ICD-10-CM

## 2021-03-02 DIAGNOSIS — E78.00 HYPERCHOLESTEREMIA: ICD-10-CM

## 2021-03-02 DIAGNOSIS — I10 BENIGN ESSENTIAL HTN: Primary | ICD-10-CM

## 2021-03-02 DIAGNOSIS — K21.9 GASTROESOPHAGEAL REFLUX DISEASE WITHOUT ESOPHAGITIS: ICD-10-CM

## 2021-03-02 DIAGNOSIS — J30.2 SEASONAL ALLERGIC RHINITIS, UNSPECIFIED TRIGGER: ICD-10-CM

## 2021-03-02 DIAGNOSIS — F41.8 DEPRESSION WITH ANXIETY: ICD-10-CM

## 2021-03-02 DIAGNOSIS — E87.6 HYPOKALEMIA: ICD-10-CM

## 2021-03-02 PROCEDURE — G8484 FLU IMMUNIZE NO ADMIN: HCPCS | Performed by: INTERNAL MEDICINE

## 2021-03-02 PROCEDURE — 4004F PT TOBACCO SCREEN RCVD TLK: CPT | Performed by: INTERNAL MEDICINE

## 2021-03-02 PROCEDURE — 3017F COLORECTAL CA SCREEN DOC REV: CPT | Performed by: INTERNAL MEDICINE

## 2021-03-02 PROCEDURE — G8419 CALC BMI OUT NRM PARAM NOF/U: HCPCS | Performed by: INTERNAL MEDICINE

## 2021-03-02 PROCEDURE — 99214 OFFICE O/P EST MOD 30 MIN: CPT | Performed by: INTERNAL MEDICINE

## 2021-03-02 PROCEDURE — G8427 DOCREV CUR MEDS BY ELIG CLIN: HCPCS | Performed by: INTERNAL MEDICINE

## 2021-03-02 RX ORDER — ATORVASTATIN CALCIUM 80 MG/1
40 TABLET, FILM COATED ORAL DAILY
Qty: 90 TABLET | Refills: 3 | Status: SHIPPED | OUTPATIENT
Start: 2021-03-02 | End: 2021-08-19 | Stop reason: SDUPTHER

## 2021-03-02 RX ORDER — FLUTICASONE PROPIONATE 50 MCG
SPRAY, SUSPENSION (ML) NASAL
Qty: 3 BOTTLE | Refills: 3 | Status: SHIPPED | OUTPATIENT
Start: 2021-03-02 | End: 2022-03-17

## 2021-03-02 RX ORDER — POTASSIUM CHLORIDE 20 MEQ/1
20 TABLET, EXTENDED RELEASE ORAL DAILY
Qty: 90 TABLET | Refills: 1 | Status: SHIPPED | OUTPATIENT
Start: 2021-03-02 | End: 2021-09-29

## 2021-03-02 RX ORDER — HYDROCHLOROTHIAZIDE 25 MG/1
TABLET ORAL
Qty: 90 TABLET | Refills: 1 | Status: SHIPPED | OUTPATIENT
Start: 2021-03-02 | End: 2021-03-23

## 2021-03-02 RX ORDER — LEVOTHYROXINE SODIUM 0.03 MG/1
25 TABLET ORAL DAILY
Qty: 90 TABLET | Refills: 3 | Status: SHIPPED | OUTPATIENT
Start: 2021-03-02 | End: 2022-02-28

## 2021-03-02 RX ORDER — AMLODIPINE BESYLATE 5 MG/1
TABLET ORAL
Qty: 90 TABLET | Refills: 1 | Status: SHIPPED | OUTPATIENT
Start: 2021-03-02 | End: 2021-08-30

## 2021-03-02 RX ORDER — OMEPRAZOLE 20 MG/1
20 CAPSULE, DELAYED RELEASE ORAL DAILY
Qty: 90 CAPSULE | Refills: 1 | Status: SHIPPED | OUTPATIENT
Start: 2021-03-02 | End: 2021-09-24

## 2021-03-02 ASSESSMENT — ENCOUNTER SYMPTOMS
BLOOD IN STOOL: 0
CHEST TIGHTNESS: 0
DIARRHEA: 0
SHORTNESS OF BREATH: 0
RHINORRHEA: 0
SORE THROAT: 0
NAUSEA: 0
CONSTIPATION: 0
COUGH: 0
ABDOMINAL PAIN: 0

## 2021-03-02 ASSESSMENT — PATIENT HEALTH QUESTIONNAIRE - PHQ9
SUM OF ALL RESPONSES TO PHQ QUESTIONS 1-9: 2
SUM OF ALL RESPONSES TO PHQ9 QUESTIONS 1 & 2: 2
1. LITTLE INTEREST OR PLEASURE IN DOING THINGS: 1
SUM OF ALL RESPONSES TO PHQ QUESTIONS 1-9: 2
SUM OF ALL RESPONSES TO PHQ QUESTIONS 1-9: 2

## 2021-03-02 NOTE — PROGRESS NOTES
Amina Kathleen (:  1960) is a 56 y.o. female,Established patient, here for evaluation of the following chief complaint(s):  Discuss Labs, Hypertension, Hyperlipidemia, Hypothyroidism, Gastroesophageal Reflux, and Mental Health Problem      ASSESSMENT/PLAN:  1. Benign essential HTN  -     Comprehensive Metabolic Panel; Future  -     hydroCHLOROthiazide (HYDRODIURIL) 25 MG tablet; TAKE 1 TABLET BY MOUTH EVERY DAY, Disp-90 tablet, R-1Normal  -     amLODIPine (NORVASC) 5 MG tablet; take 1 tablet by mouth once daily, Disp-90 tablet, R-1Normal  2. Acquired hypothyroidism  3. Gastroesophageal reflux disease without esophagitis  -     omeprazole (PRILOSEC) 20 MG delayed release capsule; Take 1 capsule by mouth Daily, Disp-90 capsule, R-1Normal  4. Hypercholesteremia  -     Comprehensive Metabolic Panel; Future  -     Lipid Panel; Future  5. Depression with anxiety  6. Postmenopausal symptoms  -     sertraline (ZOLOFT) 50 MG tablet; TAKE 1 TABLET BY MOUTH EVERY DAY, Disp-90 tablet, R-1Normal  7. Hypokalemia  -     potassium chloride (KLOR-CON M) 20 MEQ extended release tablet; Take 1 tablet by mouth daily, Disp-90 tablet, R-1Normal  8. Seasonal allergic rhinitis  -     fluticasone (FLONASE) 50 MCG/ACT nasal spray; USE 2 SPRAYS EACH NOSTRIL EVERY DAY, Disp-3 Bottle, R-3Normal      Plan:  1. Benign essential HTN  Controlled on the current medication. 2. Acquired hypothyroidism  Controlled on Levothyroxine    3. Gastroesophageal reflux disease without esophagitis  Controlled on Prilosec. 4. Hypercholesteremia  Controlled on Lipitor. Obtain labs approximately one week prior to the office appointment. Please fast for 12 hours prior to obtaining the labs. Water or black coffee (no cream or sugar) is allowed prior to the the labs. 5. Depression with anxiety  Controlled on Zoloft.         Opal Le was instructed to follow up in the clinic in 6 months for check up or as needed with any medical issues. SUBJECTIVE/OBJECTIVE:  An Triplett presents for a check up on her medical conditions HTN, Hyperlipidemia, Hypothyroidism, GERD. An Triplett denies new problems. Medications were reviewed with An Triplett, she is  tolerating the medication. Bowels are regular. There has not been rectal bleeding. An Triplett denies urinary complications, the urine stream is good. An Triplett denies chest pain and denies increasing shortness of breath. Labs from 21 reviewed. Depression / anxiety is well controlled on Zoloft. Seeing Chiropractor for neck pain after falling on ice a week ago. Past Medical History:  No date: Arthritis  No date: Diverticulitis  No date: GERD (gastroesophageal reflux disease)  No date: Hyperlipidemia  No date: Hypertension  No date: Hypothyroidism    Past Surgical History:  No date:  SECTION  No date: CHOLECYSTECTOMY  : COLONOSCOPY  2018: COLONOSCOPY; N/A      Comment:  Dr. Hilario Pinedo:  2 adenomatous polyps. Social History    Socioeconomic History      Marital status:       Spouse name: Aristides Gamble      Number of children: 1      Years of education: 12      Highest education level: High school graduate    Occupational History      Not on file    Social Needs      Financial resource strain: Not hard at all      Food insecurity        Worry: Never true        Inability: Never true      Transportation needs        Medical: No        Non-medical: No    Tobacco Use      Smoking status: Current Every Day Smoker        Packs/day: 1.00        Years: 30.00        Pack years: 30        Types: Cigarettes      Smokeless tobacco: Never Used    Substance and Sexual Activity      Alcohol use:  Yes        Alcohol/week: 2.0 standard drinks        Types: 2 Shots of liquor per week      Drug use: No      Sexual activity: Yes        Partners: Male    Lifestyle      Physical activity        Days per week: Not on file        Minutes per session: Not on file      Stress: Not on file Relationships      Social connections        Talks on phone: Not on file        Gets together: Not on file        Attends Quaker service: Not on file        Active member of club or organization: Not on file        Attends meetings of clubs or organizations: Not on file        Relationship status: Not on file      Intimate partner violence        Fear of current or ex partner: Not on file        Emotionally abused: Not on file        Physically abused: Not on file        Forced sexual activity: Not on file    Other Topics      Concerns:        Not on file    Social History Narrative      Not on file      Review of patient's family history indicates:  Problem: Heart Disease      Relation: Mother          Age of Onset: (Not Specified)  Problem: Heart Disease      Relation: Father          Age of Onset: (Not Specified)  Problem: Ovarian Cancer      Relation: Sister          Age of Onset: (Not Specified)      Current Outpatient Medications on File Prior to Visit:    sertraline (ZOLOFT) 50 MG tablet, TAKE 1 TABLET BY MOUTH EVERY DAY, Disp: 90 tablet, Rfl: 1    hydroCHLOROthiazide (HYDRODIURIL) 25 MG tablet, TAKE 1 TABLET BY MOUTH EVERY DAY, Disp: 90 tablet, Rfl: 1    potassium chloride (KLOR-CON M) 20 MEQ extended release tablet, Take 1 tablet by mouth daily, Disp: 90 tablet, Rfl: 1    fluticasone (FLONASE) 50 MCG/ACT nasal spray, USE 2 SPRAYS EACH NOSTRIL EVERY DAY, Disp: 1 Bottle, Rfl: 5    levothyroxine (SYNTHROID) 25 MCG tablet, Take 25 mcg by mouth, Disp: , Rfl:     atorvastatin (LIPITOR) 80 MG tablet, Take 40 mg by mouth , Disp: , Rfl:     amLODIPine (NORVASC) 5 MG tablet, take 1 tablet by mouth once daily, Disp: 90 tablet, Rfl: 1    omeprazole (PRILOSEC) 20 MG delayed release capsule, Take 1 capsule by mouth Daily, Disp: 90 capsule, Rfl: 1    cetirizine (ZYRTEC) 10 MG tablet, Take 10 mg by mouth daily. , Disp: , Rfl:     No current facility-administered medications on file prior to visit.         -- Alendronate Sodium -- Nausea Only    --  Body aches   -- Ace Inhibitors -- Other (See Comments)    --  Causes a cough.       Lab Results       Component                Value               Date                       NA                       137                 02/24/2021                 K                        3.4 (L)             02/24/2021                 CL                       99                  02/24/2021                 CO2                      28                  02/24/2021                 BUN                      14                  02/24/2021                 CREATININE               0.73                02/24/2021                 GLUCOSE                  97                  02/24/2021                 CALCIUM                  10.1                02/24/2021                 PROT                     7.6                 02/24/2021                 LABALBU                  4.4                 02/24/2021                 BILITOT                  0.81                02/24/2021                 ALKPHOS                  106 (H)             02/24/2021                 AST                      18                  02/24/2021                 ALT                      16                  02/24/2021                 LABGLOM                  >60                 02/24/2021                 GFRAA                    >60                 02/24/2021              Lab Results       Component                Value               Date                       LABA1C                   5.7                 08/31/2020            No results found for: EAG    Lab Results       Component                Value               Date                       CHOL                     189                 02/24/2021                 CHOL                     145                 08/29/2020                 CHOL                     220 (H)             02/22/2018            Lab Results       Component                Value               Date                       TRIG 170 (H)             02/24/2021                 TRIG                     104                 08/29/2020                 TRIG                     205 (H)             02/22/2018            Lab Results       Component                Value               Date                       HDL                      69                  02/24/2021                 HDL                      57                  08/29/2020                 HDL                      59                  02/22/2018            Lab Results       Component                Value               Date                       LDLCHOLESTEROL           86                  02/24/2021                 LDLCHOLESTEROL           67                  08/29/2020                 LDLCHOLESTEROL           120                 02/22/2018            Lab Results       Component                Value               Date                       VLDL                                         02/24/2021             NOT REPORTED (H)       VLDL                     NOT REPORTED        08/29/2020                 VLDL                     NOT REPORTED        02/22/2018            Lab Results       Component                Value               Date                       CHOLHDLRATIO             2.7                 02/24/2021                 CHOLHDLRATIO             2.5                 08/29/2020                 CHOLHDLRATIO             3.7                 02/22/2018                              Hypertension  This is a chronic problem. The current episode started more than 1 year ago. The problem is unchanged. The problem is controlled. Pertinent negatives include no chest pain, neck pain, palpitations or shortness of breath. Risk factors for coronary artery disease include dyslipidemia. Past treatments include diuretics and calcium channel blockers. The current treatment provides significant improvement. There are no compliance problems. There is no history of angina.    Hyperlipidemia  This Musculoskeletal: Normal range of motion. Lymphadenopathy:      Cervical: No cervical adenopathy. Skin:     Findings: No rash. Neurological:      Mental Status: She is alert. Psychiatric:         Behavior: Behavior normal.         Thought Content: Thought content normal.                 An electronic signature was used to authenticate this note.     --Laurita Cleveland MD

## 2021-03-02 NOTE — PATIENT INSTRUCTIONS
Survey: You may be receiving a survey from [employer] regarding your visit today. You may get this in the mail, through your MyChart or in your email. Please complete the survey to enable us to provide the highest quality of care to you and your family. Please also, mention our names. If you cannot score us as very good (5 Stars) on any question, please feel free to call the office to discuss how we could have made your experience exceptional.      Thank You! MD Tahir Thurston, RUBINA Leyva, Sarah Gallo, MELN YVETTE    [facility], [address] [place] [address]      1. Benign essential HTN  Controlled on the current medication. 2. Acquired hypothyroidism  Controlled on Levothyroxine    3. Gastroesophageal reflux disease without esophagitis  Controlled on Prilosec. 4. Hypercholesteremia  Controlled on Lipitor. Obtain labs approximately one week prior to the office appointment. Please fast for 12 hours prior to obtaining the labs. Water or black coffee (no cream or sugar) is allowed prior to the the labs. 5. Depression with anxiety  Controlled on Zoloft. Olga Diaz was instructed to follow up in the clinic in 6 months for check up or as needed with any medical issues.

## 2021-03-23 DIAGNOSIS — N95.9 POSTMENOPAUSAL SYMPTOMS: ICD-10-CM

## 2021-03-23 DIAGNOSIS — I10 BENIGN ESSENTIAL HTN: ICD-10-CM

## 2021-03-23 RX ORDER — HYDROCHLOROTHIAZIDE 25 MG/1
TABLET ORAL
Qty: 30 TABLET | Refills: 2 | Status: SHIPPED | OUTPATIENT
Start: 2021-03-23 | End: 2021-12-27 | Stop reason: SDUPTHER

## 2021-03-23 NOTE — TELEPHONE ENCOUNTER
Health Maintenance   Topic Date Due    COVID-19 Vaccine (1) Never done    Shingles Vaccine (1 of 2) Never done    Low dose CT lung screening  Never done    Cervical cancer screen  02/14/2017    Flu vaccine (1) 09/01/2020    A1C test (Diabetic or Prediabetic)  08/31/2021    Lipid screen  02/24/2022    TSH testing  02/24/2022    Potassium monitoring  02/24/2022    Creatinine monitoring  02/24/2022    Breast cancer screen  03/04/2022    DTaP/Tdap/Td vaccine (2 - Td) 02/08/2023    Colon cancer screen colonoscopy  06/08/2023    Pneumococcal 0-64 years Vaccine  Completed    Hepatitis C screen  Completed    HIV screen  Completed    Hepatitis A vaccine  Aged Out    Hepatitis B vaccine  Aged Out    Hib vaccine  Aged Out    Meningococcal (ACWY) vaccine  Aged Out             (applicable per patient's age: Cancer Screenings, Depression Screening, Fall Risk Screening, Immunizations)    Hemoglobin A1C (%)   Date Value   08/31/2020 5.7     LDL Cholesterol (mg/dL)   Date Value   02/24/2021 86     AST (U/L)   Date Value   02/24/2021 18     ALT (U/L)   Date Value   02/24/2021 16     BUN (mg/dL)   Date Value   02/24/2021 14      (goal A1C is < 7)   (goal LDL is <100) need 30-50% reduction from baseline     BP Readings from Last 3 Encounters:   03/02/21 137/81   08/31/20 139/80   08/28/20 138/85    (goal /80)      All Future Testing planned in [facility]:  Lab Frequency Next Occurrence   Comprehensive Metabolic Panel Once 38/71/6884   Lipid Panel Once 09/02/2021       Next Visit Date:  Future Appointments   Date Time Provider Shae Isaac   9/3/2021  2:00 PM MD Kenia Phipps            Patient Active Problem List:     Hypercholesteremia     Depression with anxiety     GERD (gastroesophageal reflux disease)     Benign essential HTN     Postmenopausal symptoms     Seasonal allergies     Hashimoto's thyroiditis     Acquired hypothyroidism

## 2021-03-25 ENCOUNTER — TELEPHONE (OUTPATIENT)
Dept: FAMILY MEDICINE CLINIC | Age: 61
End: 2021-03-25

## 2021-03-25 DIAGNOSIS — K57.92 DIVERTICULITIS: ICD-10-CM

## 2021-03-25 RX ORDER — CIPROFLOXACIN 500 MG/1
500 TABLET, FILM COATED ORAL 2 TIMES DAILY
Qty: 20 TABLET | Refills: 0 | Status: SHIPPED | OUTPATIENT
Start: 2021-03-25 | End: 2021-10-11 | Stop reason: SDUPTHER

## 2021-03-25 NOTE — TELEPHONE ENCOUNTER
Diverticulitis is flairing up again. Asked if she could get an antibiotic? Offered pt appt but she is unable to come in due to work.  Please send to [address] [facility] [employer] [facility] Maintenance   Topic Date Due    COVID-19 Vaccine (1) Never done    Shingles Vaccine (1 of 2) Never done    Low dose CT lung screening  Never done    Cervical cancer screen  02/14/2017    Flu vaccine (1) 09/01/2020    A1C test (Diabetic or Prediabetic)  08/31/2021    Lipid screen  02/24/2022    TSH testing  02/24/2022    Potassium monitoring  02/24/2022    Creatinine monitoring  02/24/2022    Breast cancer screen  03/04/2022    DTaP/Tdap/Td vaccine (2 - Td) 02/08/2023    Colon cancer screen colonoscopy  06/08/2023    Pneumococcal 0-64 years Vaccine  Completed    Hepatitis C screen  Completed    HIV screen  Completed    Hepatitis A vaccine  Aged Out    Hepatitis B vaccine  Aged Out    Hib vaccine  Aged Out    Meningococcal (ACWY) vaccine  Aged Out             (applicable per patient's age: Cancer Screenings, Depression Screening, Fall Risk Screening, Immunizations)    Hemoglobin A1C (%)   Date Value   08/31/2020 5.7     LDL Cholesterol (mg/dL)   Date Value   02/24/2021 86     AST (U/L)   Date Value   02/24/2021 18     ALT (U/L)   Date Value   02/24/2021 16     BUN (mg/dL)   Date Value   02/24/2021 14      (goal A1C is < 7)   (goal LDL is <100) need 30-50% reduction from baseline     BP Readings from Last 3 Encounters:   03/02/21 137/81   08/31/20 139/80   08/28/20 138/85    (goal /80)      All Future Testing planned in [facility]:  Lab Frequency Next Occurrence   Comprehensive Metabolic Panel Once 54/14/3105   Lipid Panel Once 09/02/2021       Next Visit Date:  Future Appointments   Date Time Provider Shae Isaac   9/3/2021  2:00 PM MD Kenia Lyons            Patient Active Problem List:     Hypercholesteremia     Depression with anxiety     GERD (gastroesophageal reflux disease)     Benign essential HTN     Postmenopausal symptoms     Seasonal allergies     Hashimoto's thyroiditis     Acquired hypothyroidism

## 2021-08-19 RX ORDER — ATORVASTATIN CALCIUM 40 MG/1
40 TABLET, FILM COATED ORAL DAILY
Qty: 90 TABLET | Refills: 1 | Status: SHIPPED | OUTPATIENT
Start: 2021-08-19 | End: 2022-02-15

## 2021-08-19 NOTE — TELEPHONE ENCOUNTER
Pt called requesting refill on Atorvastatin. She request the new Rx be for the 40 mg not the 80 mg she has been cutting in half.        Health Maintenance   Topic Date Due    COVID-19 Vaccine (1) Never done    Shingles Vaccine (1 of 2) Never done    Low dose CT lung screening  Never done    Cervical cancer screen  02/14/2017    A1C test (Diabetic or Prediabetic)  08/31/2021    Flu vaccine (1) 09/01/2021    Lipid screen  02/24/2022    TSH testing  02/24/2022    Potassium monitoring  02/24/2022    Creatinine monitoring  02/24/2022    Breast cancer screen  03/04/2022    DTaP/Tdap/Td vaccine (2 - Td or Tdap) 02/08/2023    Pneumococcal 0-64 years Vaccine (2 of 2 - PPSV23) 03/21/2023    Colon cancer screen colonoscopy  06/08/2023    Hepatitis C screen  Completed    HIV screen  Completed    Hepatitis A vaccine  Aged Out    Hepatitis B vaccine  Aged Out    Hib vaccine  Aged Out    Meningococcal (ACWY) vaccine  Aged Out             (applicable per patient's age: Cancer Screenings, Depression Screening, Fall Risk Screening, Immunizations)    Hemoglobin A1C (%)   Date Value   08/31/2020 5.7     LDL Cholesterol (mg/dL)   Date Value   02/24/2021 86     AST (U/L)   Date Value   02/24/2021 18     ALT (U/L)   Date Value   02/24/2021 16     BUN (mg/dL)   Date Value   02/24/2021 14      (goal A1C is < 7)   (goal LDL is <100) need 30-50% reduction from baseline     BP Readings from Last 3 Encounters:   03/02/21 137/81   08/31/20 139/80   08/28/20 138/85    (goal /80)      All Future Testing planned in [facility]:  Lab Frequency Next Occurrence   Comprehensive Metabolic Panel Once 47/18/6516   Lipid Panel Once 09/02/2021       Next Visit Date:  Future Appointments   Date Time Provider Shae Isaac   9/3/2021  2:00 PM Addi Bunch MD [facility] [address] [facility]            Patient Active Problem List:     Hypercholesteremia     Depression with anxiety     GERD (gastroesophageal reflux disease)     Benign essential HTN     Postmenopausal symptoms     Seasonal allergies     Hashimoto's thyroiditis     Acquired hypothyroidism

## 2021-08-29 DIAGNOSIS — I10 BENIGN ESSENTIAL HTN: ICD-10-CM

## 2021-08-30 RX ORDER — AMLODIPINE BESYLATE 5 MG/1
TABLET ORAL
Qty: 30 TABLET | Refills: 5 | Status: SHIPPED | OUTPATIENT
Start: 2021-08-30 | End: 2022-09-12

## 2021-08-30 NOTE — TELEPHONE ENCOUNTER
Health Maintenance   Topic Date Due    COVID-19 Vaccine (1) Never done    Shingles Vaccine (1 of 2) Never done    Low dose CT lung screening  Never done    Cervical cancer screen  02/14/2017    A1C test (Diabetic or Prediabetic)  08/31/2021    Flu vaccine (1) 09/01/2021    Lipid screen  02/24/2022    TSH testing  02/24/2022    Potassium monitoring  02/24/2022    Creatinine monitoring  02/24/2022    Breast cancer screen  03/04/2022    DTaP/Tdap/Td vaccine (2 - Td or Tdap) 02/08/2023    Pneumococcal 0-64 years Vaccine (2 of 2 - PPSV23) 03/21/2023    Colon cancer screen colonoscopy  06/08/2023    Hepatitis C screen  Completed    HIV screen  Completed    Hepatitis A vaccine  Aged Out    Hepatitis B vaccine  Aged Out    Hib vaccine  Aged Out    Meningococcal (ACWY) vaccine  Aged Out             (applicable per patient's age: Cancer Screenings, Depression Screening, Fall Risk Screening, Immunizations)    Hemoglobin A1C (%)   Date Value   08/31/2020 5.7     LDL Cholesterol (mg/dL)   Date Value   02/24/2021 86     AST (U/L)   Date Value   02/24/2021 18     ALT (U/L)   Date Value   02/24/2021 16     BUN (mg/dL)   Date Value   02/24/2021 14      (goal A1C is < 7)   (goal LDL is <100) need 30-50% reduction from baseline     BP Readings from Last 3 Encounters:   03/02/21 137/81   08/31/20 139/80   08/28/20 138/85    (goal /80)      All Future Testing planned in [facility]:  Lab Frequency Next Occurrence   Comprehensive Metabolic Panel Once 05/50/9531   Lipid Panel Once 09/02/2021       Next Visit Date:  Future Appointments   Date Time Provider Shae Isaac   9/3/2021  2:00 PM Jana Wilkinson MD [facility] [address] [facility]            Patient Active Problem List:     Hypercholesteremia     Depression with anxiety     GERD (gastroesophageal reflux disease)     Benign essential HTN     Postmenopausal symptoms     Seasonal allergies     Hashimoto's thyroiditis     Acquired hypothyroidism

## 2021-09-24 DIAGNOSIS — K21.9 GASTROESOPHAGEAL REFLUX DISEASE WITHOUT ESOPHAGITIS: ICD-10-CM

## 2021-09-24 RX ORDER — OMEPRAZOLE 20 MG/1
CAPSULE, DELAYED RELEASE ORAL
Qty: 30 CAPSULE | Refills: 2 | Status: SHIPPED | OUTPATIENT
Start: 2021-09-24 | End: 2021-12-27 | Stop reason: SDUPTHER

## 2021-09-24 NOTE — TELEPHONE ENCOUNTER
Health Maintenance   Topic Date Due    COVID-19 Vaccine (1) Never done    Cervical cancer screen  Never done    Shingles Vaccine (1 of 2) Never done    Low dose CT lung screening  Never done    A1C test (Diabetic or Prediabetic)  08/31/2021    Flu vaccine (1) 09/01/2021    Lipid screen  02/24/2022    TSH testing  02/24/2022    Potassium monitoring  02/24/2022    Creatinine monitoring  02/24/2022    Breast cancer screen  03/04/2022    DTaP/Tdap/Td vaccine (2 - Td or Tdap) 02/08/2023    Pneumococcal 0-64 years Vaccine (2 of 2 - PPSV23) 03/21/2023    Colon cancer screen colonoscopy  06/08/2023    Hepatitis C screen  Completed    HIV screen  Completed    Hepatitis A vaccine  Aged Out    Hepatitis B vaccine  Aged Out    Hib vaccine  Aged Out    Meningococcal (ACWY) vaccine  Aged Out             (applicable per patient's age: Cancer Screenings, Depression Screening, Fall Risk Screening, Immunizations)    Hemoglobin A1C (%)   Date Value   08/31/2020 5.7     LDL Cholesterol (mg/dL)   Date Value   02/24/2021 86     AST (U/L)   Date Value   02/24/2021 18     ALT (U/L)   Date Value   02/24/2021 16     BUN (mg/dL)   Date Value   02/24/2021 14      (goal A1C is < 7)   (goal LDL is <100) need 30-50% reduction from baseline     BP Readings from Last 3 Encounters:   03/02/21 137/81   08/31/20 139/80   08/28/20 138/85    (goal /80)      All Future Testing planned in [facility]:  Lab Frequency Next Occurrence   Comprehensive Metabolic Panel Once 82/07/2185   Lipid Panel Once 09/30/2021       Next Visit Date:  Future Appointments   Date Time Provider Shae Isaac   9/30/2021  3:00 PM Lissett Conde MD [facility] [address] [facility]            Patient Active Problem List:     Hypercholesteremia     Depression with anxiety     GERD (gastroesophageal reflux disease)     Benign essential HTN     Postmenopausal symptoms     Seasonal allergies     Hashimoto's thyroiditis     Acquired hypothyroidism

## 2021-09-29 ENCOUNTER — HOSPITAL ENCOUNTER (OUTPATIENT)
Age: 61
Discharge: HOME | End: 2021-09-29
Payer: COMMERCIAL

## 2021-09-29 DIAGNOSIS — E87.6 HYPOKALEMIA: ICD-10-CM

## 2021-09-29 DIAGNOSIS — E78.00 HYPERCHOLESTEREMIA: ICD-10-CM

## 2021-09-29 DIAGNOSIS — I10 BENIGN ESSENTIAL HTN: ICD-10-CM

## 2021-09-29 LAB
ALBUMIN SERPL-MCNC: 4.3 G/DL (ref 3.5–5.2)
ALBUMIN/GLOBULIN RATIO: ABNORMAL (ref 1–2.5)
ALP BLD-CCNC: 104 U/L (ref 35–104)
ALT SERPL-CCNC: 18 U/L (ref 5–33)
ANION GAP SERPL CALCULATED.3IONS-SCNC: 12 MMOL/L (ref 9–17)
AST SERPL-CCNC: 17 U/L
BILIRUB SERPL-MCNC: 0.64 MG/DL (ref 0.3–1.2)
BUN BLDV-MCNC: 11 MG/DL (ref 8–23)
BUN/CREAT BLD: 16 (ref 9–20)
CALCIUM SERPL-MCNC: 9.2 MG/DL (ref 8.6–10.4)
CHLORIDE BLD-SCNC: 97 MMOL/L (ref 98–107)
CHOLESTEROL/HDL RATIO: 3
CHOLESTEROL: 199 MG/DL
CO2: 28 MMOL/L (ref 20–31)
CREAT SERPL-MCNC: 0.67 MG/DL (ref 0.5–0.9)
GFR AFRICAN AMERICAN: >60 ML/MIN
GFR NON-AFRICAN AMERICAN: >60 ML/MIN
GFR SERPL CREATININE-BSD FRML MDRD: ABNORMAL ML/MIN/{1.73_M2}
GFR SERPL CREATININE-BSD FRML MDRD: ABNORMAL ML/MIN/{1.73_M2}
GLUCOSE BLD-MCNC: 103 MG/DL (ref 70–99)
HDLC SERPL-MCNC: 67 MG/DL
LDL CHOLESTEROL: 92 MG/DL (ref 0–130)
PATIENT FASTING?: YES
POTASSIUM SERPL-SCNC: 3.5 MMOL/L (ref 3.7–5.3)
SODIUM BLD-SCNC: 137 MMOL/L (ref 135–144)
TOTAL PROTEIN: 7.8 G/DL (ref 6.4–8.3)
TRIGL SERPL-MCNC: 202 MG/DL
VLDLC SERPL CALC-MCNC: ABNORMAL MG/DL (ref 1–30)

## 2021-09-29 PROCEDURE — 80061 LIPID PANEL: CPT

## 2021-09-29 PROCEDURE — 80053 COMPREHEN METABOLIC PANEL: CPT

## 2021-09-29 PROCEDURE — 36415 COLL VENOUS BLD VENIPUNCTURE: CPT

## 2021-09-29 RX ORDER — POTASSIUM CHLORIDE 1500 MG/1
TABLET, EXTENDED RELEASE ORAL
Qty: 30 TABLET | Refills: 5 | Status: SHIPPED | OUTPATIENT
Start: 2021-09-29

## 2021-09-29 NOTE — TELEPHONE ENCOUNTER
Health Maintenance   Topic Date Due    COVID-19 Vaccine (1) Never done    Cervical cancer screen  Never done    Shingles Vaccine (1 of 2) Never done    Low dose CT lung screening  Never done    A1C test (Diabetic or Prediabetic)  08/31/2021    Flu vaccine (1) 09/01/2021    Lipid screen  02/24/2022    TSH testing  02/24/2022    Breast cancer screen  03/04/2022    Potassium monitoring  09/29/2022    Creatinine monitoring  09/29/2022    DTaP/Tdap/Td vaccine (2 - Td or Tdap) 02/08/2023    Pneumococcal 0-64 years Vaccine (2 of 2 - PPSV23) 03/21/2023    Colon cancer screen colonoscopy  06/08/2023    Hepatitis C screen  Completed    HIV screen  Completed    Hepatitis A vaccine  Aged Out    Hepatitis B vaccine  Aged Out    Hib vaccine  Aged Out    Meningococcal (ACWY) vaccine  Aged Out             (applicable per patient's age: Cancer Screenings, Depression Screening, Fall Risk Screening, Immunizations)    Hemoglobin A1C (%)   Date Value   08/31/2020 5.7     LDL Cholesterol (mg/dL)   Date Value   02/24/2021 86     AST (U/L)   Date Value   09/29/2021 17     ALT (U/L)   Date Value   09/29/2021 18     BUN (mg/dL)   Date Value   09/29/2021 11      (goal A1C is < 7)   (goal LDL is <100) need 30-50% reduction from baseline     BP Readings from Last 3 Encounters:   03/02/21 137/81   08/31/20 139/80   08/28/20 138/85    (goal /80)      All Future Testing planned in [facility]:  Lab Frequency Next Occurrence       Next Visit Date:  Future Appointments   Date Time Provider Shae Isaac   9/30/2021  3:00 PM Jana Wilkinson MD [facility] [address] [facility]            Patient Active Problem List:     Hypercholesteremia     Depression with anxiety     GERD (gastroesophageal reflux disease)     Benign essential HTN     Postmenopausal symptoms     Seasonal allergies     Hashimoto's thyroiditis     Acquired hypothyroidism

## 2021-09-30 ENCOUNTER — OFFICE VISIT (OUTPATIENT)
Dept: FAMILY MEDICINE CLINIC | Age: 61
End: 2021-09-30
Payer: COMMERCIAL

## 2021-09-30 VITALS
DIASTOLIC BLOOD PRESSURE: 82 MMHG | WEIGHT: 142 LBS | HEIGHT: 60 IN | OXYGEN SATURATION: 96 % | HEART RATE: 90 BPM | SYSTOLIC BLOOD PRESSURE: 134 MMHG | BODY MASS INDEX: 27.88 KG/M2

## 2021-09-30 DIAGNOSIS — E78.00 HYPERCHOLESTEREMIA: ICD-10-CM

## 2021-09-30 DIAGNOSIS — F41.8 DEPRESSION WITH ANXIETY: ICD-10-CM

## 2021-09-30 DIAGNOSIS — L29.9 PRURITUS: ICD-10-CM

## 2021-09-30 DIAGNOSIS — R73.9 HYPERGLYCEMIA: ICD-10-CM

## 2021-09-30 DIAGNOSIS — Z23 NEED FOR INFLUENZA VACCINATION: ICD-10-CM

## 2021-09-30 DIAGNOSIS — E03.9 ACQUIRED HYPOTHYROIDISM: ICD-10-CM

## 2021-09-30 DIAGNOSIS — I10 BENIGN ESSENTIAL HTN: Primary | ICD-10-CM

## 2021-09-30 DIAGNOSIS — K21.9 GASTROESOPHAGEAL REFLUX DISEASE WITHOUT ESOPHAGITIS: ICD-10-CM

## 2021-09-30 PROCEDURE — 4004F PT TOBACCO SCREEN RCVD TLK: CPT | Performed by: INTERNAL MEDICINE

## 2021-09-30 PROCEDURE — 90471 IMMUNIZATION ADMIN: CPT | Performed by: INTERNAL MEDICINE

## 2021-09-30 PROCEDURE — 99214 OFFICE O/P EST MOD 30 MIN: CPT | Performed by: INTERNAL MEDICINE

## 2021-09-30 PROCEDURE — G8427 DOCREV CUR MEDS BY ELIG CLIN: HCPCS | Performed by: INTERNAL MEDICINE

## 2021-09-30 PROCEDURE — G8419 CALC BMI OUT NRM PARAM NOF/U: HCPCS | Performed by: INTERNAL MEDICINE

## 2021-09-30 PROCEDURE — 90674 CCIIV4 VAC NO PRSV 0.5 ML IM: CPT | Performed by: INTERNAL MEDICINE

## 2021-09-30 PROCEDURE — 3017F COLORECTAL CA SCREEN DOC REV: CPT | Performed by: INTERNAL MEDICINE

## 2021-09-30 RX ORDER — PREDNISONE 20 MG/1
TABLET ORAL
Qty: 12 TABLET | Refills: 0 | Status: SHIPPED | OUTPATIENT
Start: 2021-09-30 | End: 2021-10-10

## 2021-09-30 SDOH — ECONOMIC STABILITY: FOOD INSECURITY: WITHIN THE PAST 12 MONTHS, YOU WORRIED THAT YOUR FOOD WOULD RUN OUT BEFORE YOU GOT MONEY TO BUY MORE.: NEVER TRUE

## 2021-09-30 SDOH — ECONOMIC STABILITY: FOOD INSECURITY: WITHIN THE PAST 12 MONTHS, THE FOOD YOU BOUGHT JUST DIDN'T LAST AND YOU DIDN'T HAVE MONEY TO GET MORE.: NEVER TRUE

## 2021-09-30 ASSESSMENT — ENCOUNTER SYMPTOMS
RHINORRHEA: 0
CONSTIPATION: 0
ROS SKIN COMMENTS: ITCHING
COUGH: 0
SORE THROAT: 0
DIARRHEA: 0
NAUSEA: 0
CHEST TIGHTNESS: 0
SHORTNESS OF BREATH: 0
ABDOMINAL PAIN: 0
BLOOD IN STOOL: 0

## 2021-09-30 ASSESSMENT — SOCIAL DETERMINANTS OF HEALTH (SDOH): HOW HARD IS IT FOR YOU TO PAY FOR THE VERY BASICS LIKE FOOD, HOUSING, MEDICAL CARE, AND HEATING?: NOT VERY HARD

## 2021-09-30 NOTE — PROGRESS NOTES
Hellen Burt (:  1960) is a 59 y.o. female,Established patient, here for evaluation of the following chief complaint(s):  Discuss Labs, Hypertension, Hyperlipidemia, Hypothyroidism, and Gastroesophageal Reflux         ASSESSMENT/PLAN:  1. Benign essential HTN  -     Comprehensive Metabolic Panel; Future  2. Need for influenza vaccination  -     INFLUENZA, MDCK QUADV, 2 YRS AND OLDER, IM, PF, PREFILL SYR OR SDV, 0.5ML (FLUCELVAX QUADV, PF)  3. Pruritus  -     predniSONE (DELTASONE) 20 MG tablet; 3 tabs daily for 2 days then 2 tabs daily for 2 days then 1 tab daily for 2 days. , Disp-12 tablet, R-0Normal  4. Depression with anxiety  5. Gastroesophageal reflux disease without esophagitis  6. Acquired hypothyroidism  -     TSH without Reflex; Future  -     T4, Free; Future  7. Hypercholesteremia  -     Comprehensive Metabolic Panel; Future  -     Lipid Panel; Future  8. Hyperglycemia  -     Hemoglobin A1C; Future      Plan:  1. Need for influenza vaccination  [facility] [place] was given an influenza vaccine today. - INFLUENZA, MDCK QUADV, 2 YRS AND OLDER, IM, PF, PREFILL SYR OR SDV, 0.5ML (FLUCELVAX QUADV, PF)    2. Pruritus  No rash seen. Not sure what is causing the itching at this time. Take the prednisone taper as directed on the prescription. The prednisone is very bitter so swallow the tablets quickly to prevent  dissolving in the mouth. After taking, don't lay  down for 2 hours to help prevent reflux. 3. Benign essential HTN  Controlled on the current medication.    - Comprehensive Metabolic Panel; Future    4. Depression with anxiety  Controlled on Zoloft. 5. Gastroesophageal reflux disease without esophagitis  Controlled on Omeprazole. 6. Acquired hypothyroidism  Controlled on levothyroxine. Obtain labs approximately one week prior to the office appointment. Please fast for 12 hours prior to obtaining the labs. Water or black coffee (no cream or sugar) is allowed prior to the the labs.     - TSH without Reflex; Future  - T4, Free; Future    7. Hypercholesteremia  Controlled on Lipitor. Obtain labs approximately one week prior to the office appointment. Please fast for 12 hours prior to obtaining the labs. Water or black coffee (no cream or sugar) is allowed prior to the the labs. - Comprehensive Metabolic Panel; Future  - Lipid Panel; Future    8. Hyperglycemia  HgbA1C in 6 months.    - Hemoglobin A1C; Future    Bonita Aaron was instructed to follow up in the clinic in 6 months for check up or as needed with any medical issues. Subjective   SUBJECTIVE/OBJECTIVE:  Bonita Aaron presents for a check up on her medical conditions HTN, Hyperlipidemia, Hypothyroidism, GERD. Bonita Aaron admits to new problems. Medications were reviewed with Bonita Aaron, she is  tolerating the medication. Bowels are regular. There has not been rectal bleeding. Bonita Aaron denies urinary complications, the urine stream is good. Bonita Aaron denies chest pain and denies increasing shortness of breath. Last week or so she gets itching for the past week. No rash but today seems a little better. She is taking Zyrtec daily. Past Medical History:  No date: Arthritis  No date: Diverticulitis  No date: GERD (gastroesophageal reflux disease)  No date: Hyperlipidemia  No date: Hypertension  No date: Hypothyroidism    Past Surgical History:  No date:  SECTION  No date: CHOLECYSTECTOMY  : COLONOSCOPY  2018: COLONOSCOPY; N/A      Comment:  Dr. Dea Harris:  2 adenomatous polyps.     Social History    Socioeconomic History      Marital status:       Spouse name: Airam Mahoney      Number of children: 1      Years of education: 12      Highest education level: High school graduate    Occupational History      Not on file    Tobacco Use      Smoking status: Current Every Day Smoker        Packs/day: 1.00        Years: 30.00        Pack years: 30        Types: Cigarettes      Smokeless tobacco: Never Used    Substance and Sexual mouth daily, Disp: 90 tablet, Rfl: 1  hydroCHLOROthiazide (HYDRODIURIL) 25 MG tablet, TAKE ONE TABLET BY MOUTH DAILY, Disp: 30 tablet, Rfl: 2  sertraline (ZOLOFT) 50 MG tablet, TAKE ONE TABLET BY MOUTH DAILY, Disp: 30 tablet, Rfl: 2  levothyroxine (SYNTHROID) 25 MCG tablet, Take 1 tablet by mouth Daily, Disp: 90 tablet, Rfl: 3  cetirizine (ZYRTEC) 10 MG tablet, Take 10 mg by mouth daily. , Disp: , Rfl:   fluticasone (FLONASE) 50 MCG/ACT nasal spray, USE 2 SPRAYS EACH NOSTRIL EVERY DAY, Disp: 3 Bottle, Rfl: 3    No current facility-administered medications on file prior to visit. -- Alendronate Sodium -- Nausea Only    --  Body aches   -- Ace Inhibitors -- Other (See Comments)    --  Causes a cough.       Lab Results       Component                Value               Date                       NA                       137                 09/29/2021                 K                        3.5 (L)             09/29/2021                 CL                       97 (L)              09/29/2021                 CO2                      28                  09/29/2021                 BUN                      11                  09/29/2021                 CREATININE               0.67                09/29/2021                 GLUCOSE                  103 (H)             09/29/2021                 CALCIUM                  9.2                 09/29/2021                 PROT                     7.8                 09/29/2021                 LABALBU                  4.3                 09/29/2021                 BILITOT                  0.64                09/29/2021                 ALKPHOS                  104                 09/29/2021                 AST                      17                  09/29/2021                 ALT                      18                  09/29/2021                 LABGLOM                  >60                 09/29/2021                 GFRAA                    >60                 09/29/2021 Lab Results       Component                Value               Date                       LABA1C                   5.7                 08/31/2020            No results found for: EAG    Lab Results       Component                Value               Date                       CHOL                     199                 09/29/2021                 CHOL                     189                 02/24/2021                 CHOL                     145                 08/29/2020            Lab Results       Component                Value               Date                       TRIG                     202 (H)             09/29/2021                 TRIG                     170 (H)             02/24/2021                 TRIG                     104                 08/29/2020            Lab Results       Component                Value               Date                       HDL                      67                  09/29/2021                 HDL                      69                  02/24/2021                 HDL                      57                  08/29/2020            Lab Results       Component                Value               Date                       LDLCHOLESTEROL           92                  09/29/2021                 LDLCHOLESTEROL           86                  02/24/2021                 LDLCHOLESTEROL           67                  08/29/2020            Lab Results       Component                Value               Date                       VLDL                                         09/29/2021             NOT REPORTED (H)       VLDL                                         02/24/2021             NOT REPORTED (H)       VLDL                     NOT REPORTED        08/29/2020            Lab Results       Component                Value               Date                       CHOLHDLRATIO             3.0                 09/29/2021                 CHOLHDLRATIO             2.7 02/24/2021                 CHOLHDLRATIO             2.5                 08/29/2020                              Hypertension  This is a chronic problem. The current episode started more than 1 year ago. The problem is unchanged. The problem is controlled. Pertinent negatives include no chest pain, neck pain, palpitations or shortness of breath. Past treatments include calcium channel blockers and diuretics. The current treatment provides significant improvement. There are no compliance problems. There is no history of angina. Hyperlipidemia  This is a chronic problem. The current episode started more than 1 year ago. The problem is controlled. Recent lipid tests were reviewed and are normal. Pertinent negatives include no chest pain, myalgias or shortness of breath. Current antihyperlipidemic treatment includes statins. The current treatment provides significant improvement of lipids. Gastroesophageal Reflux  She reports no abdominal pain, no chest pain, no coughing, no nausea or no sore throat. This is a chronic problem. The current episode started more than 1 year ago. The problem occurs rarely. The problem has been unchanged. She has tried a PPI for the symptoms. The treatment provided significant relief. Review of Systems   Constitutional: Negative. HENT: Negative for congestion, ear pain, rhinorrhea, sneezing and sore throat. Eyes: Negative for visual disturbance. Respiratory: Negative for cough, chest tightness and shortness of breath. Cardiovascular: Negative for chest pain and palpitations. Gastrointestinal: Negative for abdominal pain, blood in stool, constipation, diarrhea and nausea. Genitourinary: Negative for difficulty urinating, dysuria, frequency, menstrual problem and urgency. Musculoskeletal: Negative for arthralgias, joint swelling, myalgias and neck pain. Skin:        itching   Neurological: Negative for syncope. Psychiatric/Behavioral: Negative. Objective   Physical Exam  Constitutional:       Appearance: She is well-developed. HENT:      Head: Atraumatic. Eyes:      Conjunctiva/sclera: Conjunctivae normal.   Cardiovascular:      Rate and Rhythm: Normal rate and regular rhythm. Heart sounds: Normal heart sounds. Pulmonary:      Effort: Pulmonary effort is normal.      Breath sounds: Normal breath sounds. Abdominal:      Palpations: Abdomen is soft. Tenderness: There is no abdominal tenderness. Musculoskeletal:         General: Normal range of motion. Cervical back: Normal range of motion and neck supple. Lymphadenopathy:      Cervical: No cervical adenopathy. Skin:     Findings: No rash. Neurological:      Mental Status: She is alert. Psychiatric:         Behavior: Behavior normal.         Thought Content: Thought content normal.                  An electronic signature was used to authenticate this note.     --Candido Ruiz MD

## 2021-10-11 ENCOUNTER — TELEPHONE (OUTPATIENT)
Dept: FAMILY MEDICINE CLINIC | Age: 61
End: 2021-10-11

## 2021-10-11 DIAGNOSIS — K57.92 DIVERTICULITIS: Primary | ICD-10-CM

## 2021-10-11 DIAGNOSIS — L29.9 PRURITUS: ICD-10-CM

## 2021-10-11 RX ORDER — CIPROFLOXACIN 500 MG/1
500 TABLET, FILM COATED ORAL 2 TIMES DAILY
Qty: 20 TABLET | Refills: 0 | Status: SHIPPED | OUTPATIENT
Start: 2021-10-11 | End: 2021-10-21

## 2021-10-11 NOTE — TELEPHONE ENCOUNTER
Patient complains of diverticulitis and requests antibiotic to be called in to pharm. Pt uses [facility] in [employer].

## 2021-12-10 DIAGNOSIS — N95.9 POSTMENOPAUSAL SYMPTOMS: ICD-10-CM

## 2021-12-10 NOTE — TELEPHONE ENCOUNTER
Health Maintenance   Topic Date Due    Cervical cancer screen  Never done    Shingles Vaccine (1 of 2) Never done    Low dose CT lung screening  Never done    A1C test (Diabetic or Prediabetic)  08/31/2021    COVID-19 Vaccine (3 - Booster for Moderna series) 09/29/2021    TSH testing  02/24/2022    Breast cancer screen  03/04/2022    Lipid screen  09/29/2022    Potassium monitoring  09/29/2022    Creatinine monitoring  09/29/2022    DTaP/Tdap/Td vaccine (2 - Td or Tdap) 02/08/2023    Pneumococcal 0-64 years Vaccine (2 of 2 - PPSV23) 03/21/2023    Colon cancer screen colonoscopy  06/08/2023    Flu vaccine  Completed    Hepatitis C screen  Completed    HIV screen  Completed    Hepatitis A vaccine  Aged Out    Hepatitis B vaccine  Aged Out    Hib vaccine  Aged Out    Meningococcal (ACWY) vaccine  Aged Out             (applicable per patient's age: Cancer Screenings, Depression Screening, Fall Risk Screening, Immunizations)    Hemoglobin A1C (%)   Date Value   08/31/2020 5.7     LDL Cholesterol (mg/dL)   Date Value   09/29/2021 92     AST (U/L)   Date Value   09/29/2021 17     ALT (U/L)   Date Value   09/29/2021 18     BUN (mg/dL)   Date Value   09/29/2021 11      (goal A1C is < 7)   (goal LDL is <100) need 30-50% reduction from baseline     BP Readings from Last 3 Encounters:   09/30/21 134/82   03/02/21 137/81   08/31/20 139/80    (goal /80)      All Future Testing planned in [facility]:  Lab Frequency Next Occurrence   Comprehensive Metabolic Panel Once 57/07/2194   Hemoglobin A1C Once 03/30/2022   Lipid Panel Once 03/30/2022   TSH without Reflex Once 03/30/2022   T4, Free Once 03/30/2022       Next Visit Date:  Future Appointments   Date Time Provider Shae Isaac   4/14/2022  3:30 PM MD Kenia Mccon[facility]            Patient Active Problem List:     Hypercholesteremia     Depression with anxiety     GERD (gastroesophageal reflux disease)     Benign essential HTN Postmenopausal symptoms     Seasonal allergies     Hashimoto's thyroiditis     Acquired hypothyroidism

## 2021-12-24 DIAGNOSIS — K21.9 GASTROESOPHAGEAL REFLUX DISEASE WITHOUT ESOPHAGITIS: ICD-10-CM

## 2021-12-26 DIAGNOSIS — I10 BENIGN ESSENTIAL HTN: ICD-10-CM

## 2021-12-27 RX ORDER — OMEPRAZOLE 20 MG/1
CAPSULE, DELAYED RELEASE ORAL
Qty: 30 CAPSULE | Refills: 5 | Status: SHIPPED | OUTPATIENT
Start: 2021-12-27 | End: 2022-10-10

## 2021-12-27 RX ORDER — HYDROCHLOROTHIAZIDE 25 MG/1
TABLET ORAL
Qty: 30 TABLET | Refills: 5 | Status: SHIPPED | OUTPATIENT
Start: 2021-12-27 | End: 2022-06-22 | Stop reason: SDUPTHER

## 2021-12-27 NOTE — TELEPHONE ENCOUNTER
Health Maintenance   Topic Date Due    Cervical cancer screen  Never done    Shingles Vaccine (1 of 2) Never done    Low dose CT lung screening  Never done    A1C test (Diabetic or Prediabetic)  08/31/2021    COVID-19 Vaccine (3 - Booster for Moderna series) 09/29/2021    TSH testing  02/24/2022    Breast cancer screen  03/04/2022    Lipid screen  09/29/2022    Potassium monitoring  09/29/2022    Creatinine monitoring  09/29/2022    DTaP/Tdap/Td vaccine (2 - Td or Tdap) 02/08/2023    Pneumococcal 0-64 years Vaccine (2 of 2 - PPSV23) 03/21/2023    Colon cancer screen colonoscopy  06/08/2023    Flu vaccine  Completed    Hepatitis C screen  Completed    HIV screen  Completed    Hepatitis A vaccine  Aged Out    Hepatitis B vaccine  Aged Out    Hib vaccine  Aged Out    Meningococcal (ACWY) vaccine  Aged Out             (applicable per patient's age: Cancer Screenings, Depression Screening, Fall Risk Screening, Immunizations)    Hemoglobin A1C (%)   Date Value   08/31/2020 5.7     LDL Cholesterol (mg/dL)   Date Value   09/29/2021 92     AST (U/L)   Date Value   09/29/2021 17     ALT (U/L)   Date Value   09/29/2021 18     BUN (mg/dL)   Date Value   09/29/2021 11      (goal A1C is < 7)   (goal LDL is <100) need 30-50% reduction from baseline     BP Readings from Last 3 Encounters:   09/30/21 134/82   03/02/21 137/81   08/31/20 139/80    (goal /80)      All Future Testing planned in [facility]:  Lab Frequency Next Occurrence   Comprehensive Metabolic Panel Once 17/22/0430   Hemoglobin A1C Once 03/30/2022   Lipid Panel Once 03/30/2022   TSH without Reflex Once 03/30/2022   T4, Free Once 03/30/2022       Next Visit Date:  Future Appointments   Date Time Provider Shae Isaac   4/14/2022  3:30 PM MD [facility]ia [facility]            Patient Active Problem List:     Hypercholesteremia     Depression with anxiety     GERD (gastroesophageal reflux disease)     Benign essential HTN Postmenopausal symptoms     Seasonal allergies     Hashimoto's thyroiditis     Acquired hypothyroidism

## 2021-12-27 NOTE — TELEPHONE ENCOUNTER
Health Maintenance   Topic Date Due    Cervical cancer screen  Never done    Shingles Vaccine (1 of 2) Never done    Low dose CT lung screening  Never done    A1C test (Diabetic or Prediabetic)  08/31/2021    COVID-19 Vaccine (3 - Booster for Moderna series) 09/29/2021    TSH testing  02/24/2022    Breast cancer screen  03/04/2022    Lipid screen  09/29/2022    Potassium monitoring  09/29/2022    Creatinine monitoring  09/29/2022    DTaP/Tdap/Td vaccine (2 - Td or Tdap) 02/08/2023    Pneumococcal 0-64 years Vaccine (2 of 2 - PPSV23) 03/21/2023    Colon cancer screen colonoscopy  06/08/2023    Flu vaccine  Completed    Hepatitis C screen  Completed    HIV screen  Completed    Hepatitis A vaccine  Aged Out    Hepatitis B vaccine  Aged Out    Hib vaccine  Aged Out    Meningococcal (ACWY) vaccine  Aged Out             (applicable per patient's age: Cancer Screenings, Depression Screening, Fall Risk Screening, Immunizations)    Hemoglobin A1C (%)   Date Value   08/31/2020 5.7     LDL Cholesterol (mg/dL)   Date Value   09/29/2021 92     AST (U/L)   Date Value   09/29/2021 17     ALT (U/L)   Date Value   09/29/2021 18     BUN (mg/dL)   Date Value   09/29/2021 11      (goal A1C is < 7)   (goal LDL is <100) need 30-50% reduction from baseline     BP Readings from Last 3 Encounters:   09/30/21 134/82   03/02/21 137/81   08/31/20 139/80    (goal /80)      All Future Testing planned in [facility]:  Lab Frequency Next Occurrence   Comprehensive Metabolic Panel Once 66/52/6766   Hemoglobin A1C Once 03/30/2022   Lipid Panel Once 03/30/2022   TSH without Reflex Once 03/30/2022   T4, Free Once 03/30/2022       Next Visit Date:  Future Appointments   Date Time Provider Shae Isaac   4/14/2022  3:30 PM Governor MD Kenia Brown            Patient Active Problem List:     Hypercholesteremia     Depression with anxiety     GERD (gastroesophageal reflux disease)     Benign essential HTN Postmenopausal symptoms     Seasonal allergies     Hashimoto's thyroiditis     Acquired hypothyroidism

## 2022-02-15 RX ORDER — ATORVASTATIN CALCIUM 40 MG/1
TABLET, FILM COATED ORAL
Qty: 30 TABLET | Refills: 2 | Status: SHIPPED | OUTPATIENT
Start: 2022-02-15 | End: 2022-05-17

## 2022-02-15 NOTE — TELEPHONE ENCOUNTER
Health Maintenance   Topic Date Due    Cervical cancer screen  Never done    Shingles Vaccine (1 of 2) Never done    Low dose CT lung screening  Never done    COVID-19 Vaccine (3 - Booster for Moderna series) 08/29/2021    A1C test (Diabetic or Prediabetic)  08/31/2021    TSH testing  02/24/2022    Breast cancer screen  03/04/2022    Depression Monitoring  03/02/2022    Lipid screen  09/29/2022    Potassium monitoring  09/29/2022    Creatinine monitoring  09/29/2022    DTaP/Tdap/Td vaccine (2 - Td or Tdap) 02/08/2023    Pneumococcal 0-64 years Vaccine (2 of 2 - PPSV23) 03/21/2023    Colon cancer screen colonoscopy  06/08/2023    Flu vaccine  Completed    Hepatitis C screen  Completed    HIV screen  Completed    Hepatitis A vaccine  Aged Out    Hepatitis B vaccine  Aged Out    Hib vaccine  Aged Out    Meningococcal (ACWY) vaccine  Aged Out             (applicable per patient's age: Cancer Screenings, Depression Screening, Fall Risk Screening, Immunizations)    Hemoglobin A1C (%)   Date Value   08/31/2020 5.7     LDL Cholesterol (mg/dL)   Date Value   09/29/2021 92     AST (U/L)   Date Value   09/29/2021 17     ALT (U/L)   Date Value   09/29/2021 18     BUN (mg/dL)   Date Value   09/29/2021 11      (goal A1C is < 7)   (goal LDL is <100) need 30-50% reduction from baseline     BP Readings from Last 3 Encounters:   09/30/21 134/82   03/02/21 137/81   08/31/20 139/80    (goal /80)      All Future Testing planned in [facility]:  Lab Frequency Next Occurrence   Comprehensive Metabolic Panel Once 94/04/4769   Hemoglobin A1C Once 03/30/2022   Lipid Panel Once 03/30/2022   TSH without Reflex Once 03/30/2022   T4, Free Once 03/30/2022       Next Visit Date:  Future Appointments   Date Time Provider Shae Isaac   4/14/2022  3:30 PM MD Paul Schmitz [facility]            Patient Active Problem List:     Hypercholesteremia     Depression with anxiety     GERD (gastroesophageal reflux disease)     Benign essential HTN     Postmenopausal symptoms     Seasonal allergies     Hashimoto's thyroiditis     Acquired hypothyroidism

## 2022-02-28 RX ORDER — LEVOTHYROXINE SODIUM 0.03 MG/1
TABLET ORAL
Qty: 30 TABLET | Refills: 5 | Status: SHIPPED | OUTPATIENT
Start: 2022-02-28 | End: 2022-04-14

## 2022-02-28 NOTE — TELEPHONE ENCOUNTER
Health Maintenance   Topic Date Due    Cervical cancer screen  Never done    Shingles Vaccine (1 of 2) Never done    Low dose CT lung screening  Never done    COVID-19 Vaccine (3 - Booster for Moderna series) 08/29/2021    A1C test (Diabetic or Prediabetic)  08/31/2021    TSH testing  02/24/2022    Depression Monitoring  03/02/2022    Breast cancer screen  03/04/2022    Lipid screen  09/29/2022    Potassium monitoring  09/29/2022    Creatinine monitoring  09/29/2022    DTaP/Tdap/Td vaccine (2 - Td or Tdap) 02/08/2023    Pneumococcal 0-64 years Vaccine (2 of 2 - PPSV23) 03/21/2023    Colorectal Cancer Screen  06/08/2023    Flu vaccine  Completed    Hepatitis C screen  Completed    HIV screen  Completed    Hepatitis A vaccine  Aged Out    Hepatitis B vaccine  Aged Out    Hib vaccine  Aged Out    Meningococcal (ACWY) vaccine  Aged Out             (applicable per patient's age: Cancer Screenings, Depression Screening, Fall Risk Screening, Immunizations)    Hemoglobin A1C (%)   Date Value   08/31/2020 5.7     LDL Cholesterol (mg/dL)   Date Value   09/29/2021 92     AST (U/L)   Date Value   09/29/2021 17     ALT (U/L)   Date Value   09/29/2021 18     BUN (mg/dL)   Date Value   09/29/2021 11      (goal A1C is < 7)   (goal LDL is <100) need 30-50% reduction from baseline     BP Readings from Last 3 Encounters:   09/30/21 134/82   03/02/21 137/81   08/31/20 139/80    (goal /80)      All Future Testing planned in [facility]:  Lab Frequency Next Occurrence   Comprehensive Metabolic Panel Once 83/50/6373   Hemoglobin A1C Once 03/30/2022   Lipid Panel Once 03/30/2022   TSH without Reflex Once 03/30/2022   T4, Free Once 03/30/2022       Next Visit Date:  Future Appointments   Date Time Provider Shae Isaac   [facility]14[facility]2022  3[facility]30 [facility]            Patient Active Problem List:     Hypercholesteremia     Depression with anxiety     GERD (gastroesophageal reflux disease) Benign essential HTN     Postmenopausal symptoms     Seasonal allergies     Hashimoto's thyroiditis     Acquired hypothyroidism

## 2022-03-11 ENCOUNTER — TELEPHONE (OUTPATIENT)
Dept: FAMILY MEDICINE CLINIC | Age: 62
End: 2022-03-11

## 2022-03-11 DIAGNOSIS — K57.92 DIVERTICULITIS: Primary | ICD-10-CM

## 2022-03-11 RX ORDER — CIPROFLOXACIN 500 MG/1
500 TABLET, FILM COATED ORAL 2 TIMES DAILY
Qty: 20 TABLET | Refills: 0 | Status: SHIPPED | OUTPATIENT
Start: 2022-03-11 | End: 2022-03-21

## 2022-03-17 DIAGNOSIS — J30.2 SEASONAL ALLERGIC RHINITIS, UNSPECIFIED TRIGGER: ICD-10-CM

## 2022-03-17 RX ORDER — FLUTICASONE PROPIONATE 50 MCG
SPRAY, SUSPENSION (ML) NASAL
Qty: 1 EACH | Refills: 2 | Status: SHIPPED | OUTPATIENT
Start: 2022-03-17

## 2022-03-17 NOTE — TELEPHONE ENCOUNTER
Health Maintenance   Topic Date Due    Depression Monitoring  Never done    Cervical cancer screen  Never done    Shingles Vaccine (1 of 2) Never done    Low dose CT lung screening  Never done    COVID-19 Vaccine (3 - Booster for Moderna series) 08/29/2021    A1C test (Diabetic or Prediabetic)  08/31/2021    TSH testing  02/24/2022    Breast cancer screen  03/04/2022    Lipid screen  09/29/2022    Potassium monitoring  09/29/2022    Creatinine monitoring  09/29/2022    DTaP/Tdap/Td vaccine (2 - Td or Tdap) 02/08/2023    Pneumococcal 0-64 years Vaccine (2 of 2 - PPSV23) 03/21/2023    Colorectal Cancer Screen  06/08/2023    Flu vaccine  Completed    Hepatitis C screen  Completed    HIV screen  Completed    Hepatitis A vaccine  Aged Out    Hepatitis B vaccine  Aged Out    Hib vaccine  Aged Out    Meningococcal (ACWY) vaccine  Aged Out             (applicable per patient's age: Cancer Screenings, Depression Screening, Fall Risk Screening, Immunizations)    Hemoglobin A1C (%)   Date Value   08/31/2020 5.7     LDL Cholesterol (mg/dL)   Date Value   09/29/2021 92     AST (U/L)   Date Value   09/29/2021 17     ALT (U/L)   Date Value   09/29/2021 18     BUN (mg/dL)   Date Value   09/29/2021 11      (goal A1C is < 7)   (goal LDL is <100) need 30-50% reduction from baseline     BP Readings from Last 3 Encounters:   09/30/21 134/82   03/02/21 137/81   08/31/20 139/80    (goal /80)      All Future Testing planned in [facility]:  Lab Frequency Next Occurrence   Comprehensive Metabolic Panel Once 61/18/5390   Hemoglobin A1C Once 03/30/2022   Lipid Panel Once 03/30/2022   TSH without Reflex Once 03/30/2022   T4, Free Once 03/30/2022       Next Visit Date:  Future Appointments   Date Time Provider Shae Isaac   4/14/2022  3:30 PM MD Kenia Morrell            Patient Active Problem List:     Hypercholesteremia     Depression with anxiety     GERD (gastroesophageal reflux disease) Benign essential HTN     Postmenopausal symptoms     Seasonal allergies     Hashimoto's thyroiditis     Acquired hypothyroidism

## 2022-04-11 ENCOUNTER — HOSPITAL ENCOUNTER (OUTPATIENT)
Age: 62
Discharge: HOME | End: 2022-04-11
Payer: COMMERCIAL

## 2022-04-11 DIAGNOSIS — E78.00 HYPERCHOLESTEREMIA: ICD-10-CM

## 2022-04-11 DIAGNOSIS — I10 BENIGN ESSENTIAL HTN: ICD-10-CM

## 2022-04-11 DIAGNOSIS — R73.9 HYPERGLYCEMIA: ICD-10-CM

## 2022-04-11 DIAGNOSIS — E03.9 ACQUIRED HYPOTHYROIDISM: ICD-10-CM

## 2022-04-11 LAB
ALBUMIN SERPL-MCNC: 4.2 G/DL (ref 3.5–5.2)
ALP BLD-CCNC: 95 U/L (ref 35–104)
ALT SERPL-CCNC: 13 U/L (ref 5–33)
ANION GAP SERPL CALCULATED.3IONS-SCNC: 11 MMOL/L (ref 9–17)
AST SERPL-CCNC: 15 U/L
BILIRUB SERPL-MCNC: 0.48 MG/DL (ref 0.3–1.2)
BUN BLDV-MCNC: 14 MG/DL (ref 8–23)
BUN/CREAT BLD: 19 (ref 9–20)
CALCIUM SERPL-MCNC: 9.6 MG/DL (ref 8.6–10.4)
CHLORIDE BLD-SCNC: 97 MMOL/L (ref 98–107)
CHOLESTEROL/HDL RATIO: 2.9
CHOLESTEROL: 162 MG/DL
CO2: 27 MMOL/L (ref 20–31)
CREAT SERPL-MCNC: 0.75 MG/DL (ref 0.5–0.9)
GFR AFRICAN AMERICAN: >60 ML/MIN
GFR NON-AFRICAN AMERICAN: >60 ML/MIN
GFR SERPL CREATININE-BSD FRML MDRD: ABNORMAL ML/MIN/{1.73_M2}
GLUCOSE BLD-MCNC: 99 MG/DL (ref 70–99)
HDLC SERPL-MCNC: 55 MG/DL
LDL CHOLESTEROL: 81 MG/DL (ref 0–130)
PATIENT FASTING?: YES
POTASSIUM SERPL-SCNC: 3.9 MMOL/L (ref 3.7–5.3)
SODIUM BLD-SCNC: 135 MMOL/L (ref 135–144)
THYROXINE, FREE: 1.34 NG/DL (ref 0.93–1.7)
TOTAL PROTEIN: 7.7 G/DL (ref 6.4–8.3)
TRIGL SERPL-MCNC: 132 MG/DL
TSH SERPL DL<=0.05 MIU/L-ACNC: 4.43 UIU/ML (ref 0.3–5)

## 2022-04-11 PROCEDURE — 83036 HEMOGLOBIN GLYCOSYLATED A1C: CPT

## 2022-04-11 PROCEDURE — 80061 LIPID PANEL: CPT

## 2022-04-11 PROCEDURE — 84439 ASSAY OF FREE THYROXINE: CPT

## 2022-04-11 PROCEDURE — 80053 COMPREHEN METABOLIC PANEL: CPT

## 2022-04-11 PROCEDURE — 36415 COLL VENOUS BLD VENIPUNCTURE: CPT

## 2022-04-11 PROCEDURE — 84443 ASSAY THYROID STIM HORMONE: CPT

## 2022-04-12 LAB
ESTIMATED AVERAGE GLUCOSE: 126 MG/DL
HBA1C MFR BLD: 6 % (ref 4–6)

## 2022-04-14 ENCOUNTER — OFFICE VISIT (OUTPATIENT)
Dept: FAMILY MEDICINE CLINIC | Age: 62
End: 2022-04-14
Payer: COMMERCIAL

## 2022-04-14 VITALS — HEIGHT: 59 IN | BODY MASS INDEX: 29.64 KG/M2 | WEIGHT: 147 LBS

## 2022-04-14 DIAGNOSIS — R73.03 PRE-DIABETES: ICD-10-CM

## 2022-04-14 DIAGNOSIS — F41.8 DEPRESSION WITH ANXIETY: ICD-10-CM

## 2022-04-14 DIAGNOSIS — E03.9 ACQUIRED HYPOTHYROIDISM: ICD-10-CM

## 2022-04-14 DIAGNOSIS — Z12.31 OTHER SCREENING MAMMOGRAM: ICD-10-CM

## 2022-04-14 DIAGNOSIS — I10 BENIGN ESSENTIAL HTN: Primary | ICD-10-CM

## 2022-04-14 DIAGNOSIS — E78.00 HYPERCHOLESTEREMIA: ICD-10-CM

## 2022-04-14 DIAGNOSIS — J30.2 SEASONAL ALLERGIES: ICD-10-CM

## 2022-04-14 DIAGNOSIS — K21.9 GASTROESOPHAGEAL REFLUX DISEASE WITHOUT ESOPHAGITIS: ICD-10-CM

## 2022-04-14 PROCEDURE — 99214 OFFICE O/P EST MOD 30 MIN: CPT | Performed by: INTERNAL MEDICINE

## 2022-04-14 PROCEDURE — G8419 CALC BMI OUT NRM PARAM NOF/U: HCPCS | Performed by: INTERNAL MEDICINE

## 2022-04-14 PROCEDURE — 4004F PT TOBACCO SCREEN RCVD TLK: CPT | Performed by: INTERNAL MEDICINE

## 2022-04-14 PROCEDURE — G8427 DOCREV CUR MEDS BY ELIG CLIN: HCPCS | Performed by: INTERNAL MEDICINE

## 2022-04-14 PROCEDURE — 3017F COLORECTAL CA SCREEN DOC REV: CPT | Performed by: INTERNAL MEDICINE

## 2022-04-14 ASSESSMENT — PATIENT HEALTH QUESTIONNAIRE - PHQ9
3. TROUBLE FALLING OR STAYING ASLEEP: 0
6. FEELING BAD ABOUT YOURSELF - OR THAT YOU ARE A FAILURE OR HAVE LET YOURSELF OR YOUR FAMILY DOWN: 0
4. FEELING TIRED OR HAVING LITTLE ENERGY: 0
7. TROUBLE CONCENTRATING ON THINGS, SUCH AS READING THE NEWSPAPER OR WATCHING TELEVISION: 0
9. THOUGHTS THAT YOU WOULD BE BETTER OFF DEAD, OR OF HURTING YOURSELF: 0
SUM OF ALL RESPONSES TO PHQ QUESTIONS 1-9: 0
SUM OF ALL RESPONSES TO PHQ QUESTIONS 1-9: 0
5. POOR APPETITE OR OVEREATING: 0
SUM OF ALL RESPONSES TO PHQ QUESTIONS 1-9: 0
10. IF YOU CHECKED OFF ANY PROBLEMS, HOW DIFFICULT HAVE THESE PROBLEMS MADE IT FOR YOU TO DO YOUR WORK, TAKE CARE OF THINGS AT HOME, OR GET ALONG WITH OTHER PEOPLE: 0
SUM OF ALL RESPONSES TO PHQ9 QUESTIONS 1 & 2: 0
8. MOVING OR SPEAKING SO SLOWLY THAT OTHER PEOPLE COULD HAVE NOTICED. OR THE OPPOSITE, BEING SO FIGETY OR RESTLESS THAT YOU HAVE BEEN MOVING AROUND A LOT MORE THAN USUAL: 0
SUM OF ALL RESPONSES TO PHQ QUESTIONS 1-9: 0
2. FEELING DOWN, DEPRESSED OR HOPELESS: 0
1. LITTLE INTEREST OR PLEASURE IN DOING THINGS: 0

## 2022-04-14 ASSESSMENT — ENCOUNTER SYMPTOMS
NAUSEA: 0
CHEST TIGHTNESS: 0
CONSTIPATION: 0
SHORTNESS OF BREATH: 0
SORE THROAT: 0
DIARRHEA: 0
COUGH: 0
RHINORRHEA: 0
BLOOD IN STOOL: 0
ABDOMINAL PAIN: 0

## 2022-04-14 NOTE — PROGRESS NOTES
Emilia Lindquist (:  1960) is a 57 y.o. female,Established patient, here for evaluation of the following chief complaint(s):  Discuss Labs (6 month check up), Hypertension, Hyperlipidemia, Gastroesophageal Reflux, Hypothyroidism (Has not taken thyroid med past 2 months), Mental Health Problem, Allergies, and Nicotine Dependence         ASSESSMENT/PLAN:  1. Benign essential HTN  -     Comprehensive Metabolic Panel; Future  2. Other screening mammogram  -     TAMIA RENNY DIGITAL SCREEN BILATERAL; Future  3. Acquired hypothyroidism  4. Depression with anxiety  5. Gastroesophageal reflux disease without esophagitis  6. Hypercholesteremia  -     Comprehensive Metabolic Panel; Future  -     Lipid Panel; Future  7. Seasonal allergies  8. Pre-diabetes  -     Comprehensive Metabolic Panel; Future  -     Hemoglobin A1C; Future      Plan:  1. Other screening mammogram  Set up for screening mammogram.  - TAMIA RENNY DIGITAL SCREEN BILATERAL; Future    2. Benign essential HTN  Controlled on Amlodipine, and HCTZ. No change in medication.  - Comprehensive Metabolic Panel; Future    3. Acquired hypothyroidism  Stopped medication and her labs have stayed normal.  Continue off medication. 4. Depression with anxiety  Has been stable on Zoloft. 5. Gastroesophageal reflux disease without esophagitis  Controlled on Prilosec. 6. Hypercholesteremia  Controlled on Lipitor. No change in medication. Obtain labs approximately one week prior to the office appointment. Please fast for 12 hours prior to obtaining the labs. Water or black coffee (no cream or sugar) is allowed prior to the the labs. - Comprehensive Metabolic Panel; Future  - Lipid Panel; Future    7. Seasonal allergies  Controlled on Zyrtec. 8. Pre-diabetes  Discussed diet modification (watch a strict diabetic diet). Work on exercise. HgbA1C in 6 months. - Comprehensive Metabolic Panel;  Future  - Hemoglobin A1C; Future    Karon was instructed to follow up in the clinic in 6 months for check up or as needed with any medical issues. Subjective   SUBJECTIVE/OBJECTIVE:  Chely Griffith presents for a check up on her medical conditions HTN, Hyperlipidemia, GERD, Hypothyroidism, . Chely Griffith denies new problems. Medications were reviewed with Chely Griffith, she is  tolerating the medication. Bowels are regular. There has not been rectal bleeding. Chely Griffith denies urinary complications, the urine stream is good. Chely Griffith denies chest pain and denies increasing shortness of breath. Labs from  reviewed. She states she stopped Levothyroxine and her labs were normal.  Recently has been eating a lot of sweets. Depression / anxiety:  Controlled on Zoloft. Past Medical History:  No date: Arthritis  No date: Diverticulitis  No date: GERD (gastroesophageal reflux disease)  No date: Hyperlipidemia  No date: Hypertension  No date: Hypothyroidism    Past Surgical History:  No date:  SECTION  No date: CHOLECYSTECTOMY  2008: COLONOSCOPY  2018: COLONOSCOPY; N/A      Comment:  Dr. Arce :  2 adenomatous polyps. Social History    Socioeconomic History      Marital status:       Spouse name: Jake Chavez      Number of children: 1      Years of education: 12      Highest education level: High school graduate    Occupational History      Not on file    Tobacco Use      Smoking status: Current Every Day Smoker        Packs/day: 1.00        Years: 30.00        Pack years: 30        Types: Cigarettes      Smokeless tobacco: Never Used    Substance and Sexual Activity      Alcohol use:  Yes        Alcohol/week: 2.0 standard drinks        Types: 2 Shots of liquor per week      Drug use: No      Sexual activity: Yes        Partners: Male    Other Topics      Concerns:        Not on file    Social History Narrative      Not on file    Social Determinants of Health  Financial Resource Strain: Low Risk       Difficulty of Paying Living Expenses: Not very hard  Food Insecurity: No Food Insecurity      Worried About Running Out of Food in the Last Year: Never true      Ran Out of Food in the Last Year: Never true  Transportation Needs:       Lack of Transportation (Medical): Not on file      Lack of Transportation (Non-Medical):  Not on file  Physical Activity:       Days of Exercise per Week: Not on file      Minutes of Exercise per Session: Not on file  Stress:       Feeling of Stress : Not on file  Social Connections:       Frequency of Communication with Friends and Family: Not on file      Frequency of Social Gatherings with Friends and Family: Not on file      Attends [employer] Services: Not on file      Active Member of [address] [place] [address] [employer] Group or Organizations: Not on file      Attends Club or Organization Meetings: Not on file      Marital Status: Not on file  Intimate Partner Violence:       Fear of Current or Ex-Partner: Not on file      Emotionally Abused: Not on file      Physically Abused: Not on file      Sexually Abused: Not on file  Housing Stability:       Unable to Pay for Housing in the Last Year: Not on file      Number of [place] in the Last Year: Not on file      Unstable Housing in the Last Year: Not on file    Review of patient's family history indicates:  Problem: Heart Disease      Relation: Mother          Age of Onset: (Not Specified)  Problem: Heart Disease      Relation: Father          Age of Onset: (Not Specified)  Problem: Ovarian Cancer      Relation: Sister          Age of Onset: (Not Specified)      Current Outpatient Medications on File Prior to Visit:  fluticasone (FLONASE) 50 MCG/ACT nasal spray, SPRAY TWO SPRAYS IN EACH NOSTRIL ONCE DAILY, Disp: 1 each, Rfl: 2  atorvastatin (LIPITOR) 40 MG tablet, TAKE ONE TABLET BY MOUTH DAILY, Disp: 30 tablet, Rfl: 2  omeprazole (PRILOSEC) 20 MG delayed release capsule, TAKE ONE CAPSULE BY MOUTH DAILY, Disp: 30 capsule, Rfl: 5  hydroCHLOROthiazide (HYDRODIURIL) 25 MG tablet, TAKE ONE TABLET BY MOUTH DAILY, Disp: 30 tablet, Rfl: 5  sertraline (ZOLOFT) 50 MG tablet, TAKE ONE TABLET BY MOUTH DAILY, Disp: 30 tablet, Rfl: 5  KLOR-CON M20 20 MEQ extended release tablet, TAKE ONE TABLET BY MOUTH DAILY, Disp: 30 tablet, Rfl: 5  amLODIPine (NORVASC) 5 MG tablet, TAKE ONE TABLET BY MOUTH DAILY, Disp: 30 tablet, Rfl: 5  cetirizine (ZYRTEC) 10 MG tablet, Take 10 mg by mouth daily. , Disp: , Rfl:   levothyroxine (SYNTHROID) 25 MCG tablet, TAKE ONE TABLET BY MOUTH DAILY (Patient not taking: Reported on 4/14/2022), Disp: 30 tablet, Rfl: 5    No current facility-administered medications on file prior to visit. -- Alendronate Sodium -- Nausea Only    --  Body aches   -- Ace Inhibitors -- Other (See Comments)    --  Causes a cough.       Lab Results       Component                Value               Date                       NA                       135                 04/11/2022                 K                        3.9                 04/11/2022                 CL                       97 (L)              04/11/2022                 CO2                      27                  04/11/2022                 BUN                      14                  04/11/2022                 CREATININE               0.75                04/11/2022                 GLUCOSE                  99                  04/11/2022                 CALCIUM                  9.6                 04/11/2022                 PROT                     7.7                 04/11/2022                 LABALBU                  4.2                 04/11/2022                 BILITOT                  0.48                04/11/2022                 ALKPHOS                  95                  04/11/2022                 AST                      15                  04/11/2022                 ALT                      13                  04/11/2022                 LABGLOM                  >60                 04/11/2022                 GFRAA                    >60                 04/11/2022 Lab Results       Component                Value               Date                       LABA1C                   6.0                 04/11/2022            Lab Results       Component                Value               Date                       EAG                      126                 04/11/2022              Lab Results       Component                Value               Date                       CHOL                     162                 04/11/2022                 CHOL                     199                 09/29/2021                 CHOL                     189                 02/24/2021            Lab Results       Component                Value               Date                       TRIG                     132                 04/11/2022                 TRIG                     202 (H)             09/29/2021                 TRIG                     170 (H)             02/24/2021            Lab Results       Component                Value               Date                       HDL                      55                  04/11/2022                 HDL                      67                  09/29/2021                 HDL                      69                  02/24/2021            Lab Results       Component                Value               Date                       LDLCHOLESTEROL           81                  04/11/2022                 LDLCHOLESTEROL           92                  09/29/2021                 LDLCHOLESTEROL           86                  02/24/2021            Lab Results       Component                Value               Date                       VLDL                                         09/29/2021             NOT REPORTED (H)       VLDL                                         02/24/2021             NOT REPORTED (H)       VLDL                     NOT REPORTED        08/29/2020            Lab Results       Component                Value               Date CHOLHDLRATIO             2.9                 04/11/2022                 CHOLHDLRATIO             3.0                 09/29/2021                 CHOLHDLRATIO             2.7                 02/24/2021                              Hypertension  This is a chronic problem. The current episode started more than 1 year ago. The problem is unchanged. The problem is controlled. Pertinent negatives include no chest pain, neck pain, palpitations or shortness of breath. Past treatments include diuretics and calcium channel blockers. The current treatment provides significant improvement. There are no compliance problems. There is no history of angina. Hyperlipidemia  This is a chronic problem. The current episode started more than 1 year ago. The problem is controlled. Recent lipid tests were reviewed and are normal. Pertinent negatives include no chest pain, myalgias or shortness of breath. Current antihyperlipidemic treatment includes statins. The current treatment provides significant improvement of lipids. There are no compliance problems. Gastroesophageal Reflux  She reports no abdominal pain, no chest pain, no coughing, no nausea or no sore throat. This is a chronic problem. The current episode started more than 1 year ago. The problem occurs rarely. The problem has been unchanged. She has tried a PPI for the symptoms. The treatment provided significant relief. Review of Systems   Constitutional: Negative. HENT: Negative for congestion, ear pain, rhinorrhea, sneezing and sore throat. Eyes: Negative for visual disturbance. Respiratory: Negative for cough, chest tightness and shortness of breath. Cardiovascular: Negative for chest pain and palpitations. Gastrointestinal: Negative for abdominal pain, blood in stool, constipation, diarrhea and nausea. Genitourinary: Negative for difficulty urinating, dysuria, frequency, menstrual problem and urgency.    Musculoskeletal: Negative for arthralgias, joint swelling, myalgias and neck pain. Skin: Negative. Neurological: Negative for syncope. Psychiatric/Behavioral: Negative. Objective   Physical Exam  Constitutional:       Appearance: She is well-developed. HENT:      Head: Atraumatic. Eyes:      Conjunctiva/sclera: Conjunctivae normal.   Cardiovascular:      Rate and Rhythm: Normal rate and regular rhythm. Heart sounds: Normal heart sounds. Pulmonary:      Effort: Pulmonary effort is normal.      Breath sounds: Normal breath sounds. Abdominal:      Palpations: Abdomen is soft. Tenderness: There is no abdominal tenderness. Musculoskeletal:         General: Normal range of motion. Cervical back: Normal range of motion and neck supple. Lymphadenopathy:      Cervical: No cervical adenopathy. Skin:     Findings: No rash. Neurological:      Mental Status: She is alert. Psychiatric:         Behavior: Behavior normal.         Thought Content: Thought content normal.                  An electronic signature was used to authenticate this note.     --Shila Magallon MD

## 2022-04-14 NOTE — PATIENT INSTRUCTIONS
Survey: You may be receiving a survey from [employer] regarding your visit today. You may get this in the mail, through your MyChart or in your email. Please complete the survey to enable us to provide the highest quality of care to you and your family. Please also, mention our names. If you cannot score us as very good (5 Stars) on any question, please feel free to call the office to discuss how we could have made your experience exceptional.      Thank You! MD Marga Marin, RUBINA Leyva, Carlos Villavicencio, MELN RN    Don Karimi, [address] [place] [address] Av      1. Other screening mammogram  Set up for screening mammogram.  - TAMIA RENNY DIGITAL SCREEN BILATERAL; Future    2. Benign essential HTN  Controlled on Amlodipine, and HCTZ. No change in medication.  - Comprehensive Metabolic Panel; Future    3. Acquired hypothyroidism  Stopped medication and her labs have stayed normal.  Continue off medication. 4. Depression with anxiety  Has been stable on Zoloft. 5. Gastroesophageal reflux disease without esophagitis  Controlled on Prilosec. 6. Hypercholesteremia  Controlled on Lipitor. No change in medication. Obtain labs approximately one week prior to the office appointment. Please fast for 12 hours prior to obtaining the labs. Water or black coffee (no cream or sugar) is allowed prior to the the labs. - Comprehensive Metabolic Panel; Future  - Lipid Panel; Future    7. Seasonal allergies  Controlled on Zyrtec. 8. Pre-diabetes  Discussed diet modification (watch a strict diabetic diet). Work on exercise. HgbA1C in 6 months. - Comprehensive Metabolic Panel; Future  - Hemoglobin A1C; Future    Juan Polk was instructed to follow up in the clinic in 6 months for check up or as needed with any medical issues.

## 2022-04-20 ENCOUNTER — HOSPITAL ENCOUNTER (OUTPATIENT)
Dept: MAMMOGRAPHY | Age: 62
Discharge: HOME | End: 2022-04-22
Payer: COMMERCIAL

## 2022-04-20 DIAGNOSIS — Z12.31 OTHER SCREENING MAMMOGRAM: ICD-10-CM

## 2022-04-20 PROCEDURE — 77063 BREAST TOMOSYNTHESIS BI: CPT

## 2022-05-17 RX ORDER — ATORVASTATIN CALCIUM 40 MG/1
TABLET, FILM COATED ORAL
Qty: 30 TABLET | Refills: 2 | Status: SHIPPED | OUTPATIENT
Start: 2022-05-17 | End: 2022-08-15

## 2022-06-15 DIAGNOSIS — N95.9 POSTMENOPAUSAL SYMPTOMS: ICD-10-CM

## 2022-06-22 DIAGNOSIS — I10 BENIGN ESSENTIAL HTN: ICD-10-CM

## 2022-06-22 RX ORDER — HYDROCHLOROTHIAZIDE 25 MG/1
TABLET ORAL
Qty: 30 TABLET | Refills: 5 | Status: SHIPPED | OUTPATIENT
Start: 2022-06-22

## 2022-08-15 RX ORDER — ATORVASTATIN CALCIUM 40 MG/1
TABLET, FILM COATED ORAL
Qty: 30 TABLET | Refills: 2 | Status: SHIPPED | OUTPATIENT
Start: 2022-08-15

## 2022-08-22 ENCOUNTER — OFFICE VISIT (OUTPATIENT)
Dept: PRIMARY CARE CLINIC | Age: 62
End: 2022-08-22
Payer: COMMERCIAL

## 2022-08-22 VITALS
DIASTOLIC BLOOD PRESSURE: 85 MMHG | TEMPERATURE: 98.1 F | HEART RATE: 84 BPM | OXYGEN SATURATION: 95 % | RESPIRATION RATE: 16 BRPM | SYSTOLIC BLOOD PRESSURE: 129 MMHG

## 2022-08-22 DIAGNOSIS — T36.95XA ANTIBIOTIC-INDUCED YEAST INFECTION: ICD-10-CM

## 2022-08-22 DIAGNOSIS — J01.40 ACUTE NON-RECURRENT PANSINUSITIS: Primary | ICD-10-CM

## 2022-08-22 DIAGNOSIS — B37.9 ANTIBIOTIC-INDUCED YEAST INFECTION: ICD-10-CM

## 2022-08-22 PROBLEM — J30.9 ALLERGIC RHINITIS: Status: ACTIVE | Noted: 2019-05-13

## 2022-08-22 PROBLEM — K63.5 POLYP OF COLON: Status: ACTIVE | Noted: 2019-04-15

## 2022-08-22 PROBLEM — R79.89 HIGH THYROID STIMULATING HORMONE (TSH) LEVEL: Status: ACTIVE | Noted: 2019-04-29

## 2022-08-22 PROBLEM — K57.92 DIVERTICULITIS OF INTESTINE: Status: ACTIVE | Noted: 2022-08-22

## 2022-08-22 PROBLEM — D12.6 ADENOMATOUS POLYP OF COLON: Status: ACTIVE | Noted: 2022-08-22

## 2022-08-22 PROBLEM — R51.9 FACIAL PAIN: Status: ACTIVE | Noted: 2019-05-13

## 2022-08-22 PROBLEM — M79.671 PAIN IN RIGHT FOOT: Status: ACTIVE | Noted: 2022-08-22

## 2022-08-22 PROCEDURE — 99213 OFFICE O/P EST LOW 20 MIN: CPT | Performed by: NURSE PRACTITIONER

## 2022-08-22 PROCEDURE — G8427 DOCREV CUR MEDS BY ELIG CLIN: HCPCS | Performed by: NURSE PRACTITIONER

## 2022-08-22 PROCEDURE — 3017F COLORECTAL CA SCREEN DOC REV: CPT | Performed by: NURSE PRACTITIONER

## 2022-08-22 PROCEDURE — 4004F PT TOBACCO SCREEN RCVD TLK: CPT | Performed by: NURSE PRACTITIONER

## 2022-08-22 PROCEDURE — G8419 CALC BMI OUT NRM PARAM NOF/U: HCPCS | Performed by: NURSE PRACTITIONER

## 2022-08-22 RX ORDER — FLUCONAZOLE 150 MG/1
TABLET ORAL
Qty: 2 TABLET | Refills: 0 | Status: SHIPPED | OUTPATIENT
Start: 2022-08-22 | End: 2022-10-28 | Stop reason: ALTCHOICE

## 2022-08-22 RX ORDER — DOXYCYCLINE HYCLATE 100 MG
100 TABLET ORAL 2 TIMES DAILY
Qty: 14 TABLET | Refills: 0 | Status: SHIPPED | OUTPATIENT
Start: 2022-08-22 | End: 2022-08-29

## 2022-08-22 ASSESSMENT — ENCOUNTER SYMPTOMS
RHINORRHEA: 0
DIARRHEA: 0
SHORTNESS OF BREATH: 0
COUGH: 0
VOMITING: 0
SORE THROAT: 0
SINUS PRESSURE: 1
SINUS PAIN: 1
NAUSEA: 0
WHEEZING: 0

## 2022-08-22 NOTE — PATIENT INSTRUCTIONS
SURVEY:    You may be receiving a survey from [employer] regarding your visit today. Please complete the survey to enable us to provide the highest quality of care to you and your family. If you cannot score us a very good on any question, please call the office to discuss how we could of made your experience a very good one. Thank you for letting us take care of you today. We hope all your questions were addressed. If a question was overlooked or something else comes to mind after you return home, please contact a member of your Care Team listed below. Thank you,  Herberth De La O MA      Your Care Team at [address]  Provider- AIDEE Ramos-CNP  Provider- Jeanie Rapp, APRN-CNP  [address], [address] [place]  Reception- Leah Henning, [address] [place]      Walk-in contact numbers:       Phone: [phone]                 Fax: [phone]    [facility] Walk-in Hours:  Mon-Thurs: 9:00 am - 5:30 pm     Friday: 8:00 am - 12:00 pm           Sat-Sun: CLOSED       1. Sinusitis:  Encouraged to increase fluids and rest  Continue antibiotic as prescribed until all doses are completed - take with food  Probiotic OTC or greek yogurt daily while on antibiotic  Mucinex/Guaifenesin OTC as directed on package  Nasal saline spray OTC every couple of hours for nasal congestion  Fluticasone nasal spray, 1 spray each nostril, twice a day for 7 to 10 days  Warm facial packs applied to face for 5 to 10 minutes, 3 times per day  Aleve/Ibuprofen/Tylenol OTC PRN for pain, discomfort or fever  Patient instructions given for acute sinusitis and doxycycline. To ER or call 911 if any difficulty breathing, shortness of breath, inability to swallow, hives, rash, facial/tongue swelling or temp greater than 103 degrees. Follow up as needed with PCP if symptoms worsen or do not improve    2.   Antibiotic induced yeast infection:  Requests medication for yeast infection, advises that she gets yeast infection every time she takes an antibiotic. Diflucan as prescribed. Patient information given for yeast infection and Diflucan  Follow up with PCP or Walk in Care if symptoms worsen or do not improve.

## 2022-08-22 NOTE — PROGRESS NOTES
[address] [facility]  [address] [facility] [address]  Dept: [phone]  Dept Fax: [phone]    José Carranza is a 57 y.o. female who presents to the [facility] in Care today for hermedical conditions/complaints as noted below. José Carranza is c/o of Otalgia (Started Sunday-Ear pain, Nasal and facial pain, headache. All on right side.)      HPI:     Otalgia   There is pain in the right ear. This is a new problem. The current episode started yesterday (Started yesterday with right ear and right sided facial pain with headache and nasal congestion. Denies known exposure to Covid-19. Has received Covid-19 vaccines. ). The problem occurs constantly. The problem has been gradually worsening. There has been no fever. The pain is at a severity of 5/10. The pain is moderate. Associated symptoms include headaches (Right sided. ). Pertinent negatives include no coughing, diarrhea, ear discharge, rash, rhinorrhea, sore throat or vomiting. Associated symptoms comments: Nasal congestion. . She has tried acetaminophen (Flonase, zyrtec, nasal rinse and Tylenol) for the symptoms. The treatment provided no relief. There is no history of a chronic ear infection, hearing loss or a tympanostomy tube. Past Medical History:   Diagnosis Date    Arthritis     Diverticulitis     GERD (gastroesophageal reflux disease)     Hyperlipidemia     Hypertension     Hypothyroidism         Current Outpatient Medications   Medication Sig Dispense Refill    doxycycline hyclate (VIBRA-TABS) 100 MG tablet Take 1 tablet by mouth 2 times daily for 7 days 14 tablet 0    fluconazole (DIFLUCAN) 150 MG tablet Take 1 tablet by mouth at first sign of yeast infection and repeat in 72 hours for severe infection.  2 tablet 0    atorvastatin (LIPITOR) 40 MG tablet TAKE ONE TABLET BY MOUTH DAILY 30 tablet 2    hydroCHLOROthiazide (HYDRODIURIL) 25 MG tablet TAKE ONE TABLET BY MOUTH DAILY 30 tablet 5    sertraline (ZOLOFT) 50 MG tablet TAKE ONE TABLET BY MOUTH DAILY 30 tablet 5    fluticasone (FLONASE) 50 MCG/ACT nasal spray SPRAY TWO SPRAYS IN EACH NOSTRIL ONCE DAILY 1 each 2    omeprazole (PRILOSEC) 20 MG delayed release capsule TAKE ONE CAPSULE BY MOUTH DAILY 30 capsule 5    KLOR-CON M20 20 MEQ extended release tablet TAKE ONE TABLET BY MOUTH DAILY 30 tablet 5    amLODIPine (NORVASC) 5 MG tablet TAKE ONE TABLET BY MOUTH DAILY 30 tablet 5    cetirizine (ZYRTEC) 10 MG tablet Take 10 mg by mouth daily. No current facility-administered medications for this visit. Allergies   Allergen Reactions    Alendronate Sodium Nausea Only     Body aches    Ace Inhibitors Other (See Comments)     Causes a cough.       :     Review of Systems   Constitutional:  Positive for fatigue. Negative for appetite change, chills, diaphoresis and fever. HENT:  Positive for congestion, ear pain (Right), postnasal drip, sinus pressure and sinus pain (Right side of face. ). Negative for ear discharge, rhinorrhea and sore throat. Respiratory:  Negative for cough, shortness of breath and wheezing. Gastrointestinal:  Negative for diarrhea, nausea and vomiting. Skin:  Negative for rash and wound. Neurological:  Positive for headaches (Right sided. ).     :     Physical Exam  Vitals and nursing note reviewed. Constitutional:       General: She is not in acute distress. Appearance: Normal appearance. She is well-developed. She is not ill-appearing or diaphoretic. Comments: Well hydrated, nontoxic appearance. HENT:      Head: Normocephalic and atraumatic. Right Ear: Hearing, ear canal and external ear normal. A middle ear effusion (Opaque yellow fluid to lower half of TM.) is present. No mastoid tenderness. Tympanic membrane is not injected, erythematous or bulging.       Left Ear: Hearing, ear canal and external ear normal. A middle ear effusion (Pale white fluid.) is Behavior: Behavior normal. Behavior is cooperative. /85 (Site: Right Upper Arm, Position: Sitting, Cuff Size: Medium Adult)   Pulse 84   Temp 98.1 °F (36.7 °C) (Oral)   Resp 16   SpO2 95%     :      Diagnosis Orders   1. Acute non-recurrent pansinusitis  doxycycline hyclate (VIBRA-TABS) 100 MG tablet      2. Antibiotic-induced yeast infection  fluconazole (DIFLUCAN) 150 MG tablet          :      Return if symptoms worsen or fail to improve, for Resume all previous medications as directed. Orders Placed This Encounter   Medications    doxycycline hyclate (VIBRA-TABS) 100 MG tablet     Sig: Take 1 tablet by mouth 2 times daily for 7 days     Dispense:  14 tablet     Refill:  0    fluconazole (DIFLUCAN) 150 MG tablet     Sig: Take 1 tablet by mouth at first sign of yeast infection and repeat in 72 hours for severe infection. Dispense:  2 tablet     Refill:  0      1. Sinusitis:  Encouraged to increase fluids and rest  Continue antibiotic as prescribed until all doses are completed - take with food  Probiotic OTC or greek yogurt daily while on antibiotic  Mucinex/Guaifenesin OTC as directed on package  Nasal saline spray OTC every couple of hours for nasal congestion  Fluticasone nasal spray, 1 spray each nostril, twice a day for 7 to 10 days  Warm facial packs applied to face for 5 to 10 minutes, 3 times per day  Aleve/Ibuprofen/Tylenol OTC PRN for pain, discomfort or fever  Patient instructions given for acute sinusitis and doxycycline. To ER or call 911 if any difficulty breathing, shortness of breath, inability to swallow, hives, rash, facial/tongue swelling or temp greater than 103 degrees. Follow up as needed with PCP if symptoms worsen or do not improve    2. Antibiotic induced yeast infection:  Requests medication for yeast infection, advises that she gets yeast infection every time she takes an antibiotic. Diflucan as prescribed.   Patient information given for yeast infection and Diflucan  Follow up with PCP or Walk in Care if symptoms worsen or do not improve. Isabel received counseling on the following healthy behaviors: medication adherence. Patient given educational materials - see patient instructions. Discussed use, benefit, and side effects of prescribed medications. Treatment plan discussed at visit. Continue routine health care follow up. All patient questions answered. Pt voiced understanding.       Electronically signed by AIDEE Huff CNP on 8/22/2022 at 10:33 AM

## 2022-09-11 DIAGNOSIS — I10 BENIGN ESSENTIAL HTN: ICD-10-CM

## 2022-09-12 RX ORDER — AMLODIPINE BESYLATE 5 MG/1
TABLET ORAL
Qty: 30 TABLET | Refills: 5 | Status: SHIPPED | OUTPATIENT
Start: 2022-09-12

## 2022-09-26 ENCOUNTER — OFFICE VISIT (OUTPATIENT)
Dept: PRIMARY CARE CLINIC | Age: 62
End: 2022-09-26
Payer: COMMERCIAL

## 2022-09-26 VITALS
OXYGEN SATURATION: 97 % | RESPIRATION RATE: 18 BRPM | WEIGHT: 147 LBS | BODY MASS INDEX: 29.64 KG/M2 | TEMPERATURE: 97.9 F | SYSTOLIC BLOOD PRESSURE: 130 MMHG | HEIGHT: 59 IN | HEART RATE: 81 BPM | DIASTOLIC BLOOD PRESSURE: 83 MMHG

## 2022-09-26 DIAGNOSIS — J40 BRONCHITIS WITH ACUTE WHEEZING: Primary | ICD-10-CM

## 2022-09-26 DIAGNOSIS — J01.40 ACUTE PANSINUSITIS, RECURRENCE NOT SPECIFIED: ICD-10-CM

## 2022-09-26 PROCEDURE — G8419 CALC BMI OUT NRM PARAM NOF/U: HCPCS | Performed by: NURSE PRACTITIONER

## 2022-09-26 PROCEDURE — 3017F COLORECTAL CA SCREEN DOC REV: CPT | Performed by: NURSE PRACTITIONER

## 2022-09-26 PROCEDURE — G8427 DOCREV CUR MEDS BY ELIG CLIN: HCPCS | Performed by: NURSE PRACTITIONER

## 2022-09-26 PROCEDURE — 99213 OFFICE O/P EST LOW 20 MIN: CPT | Performed by: NURSE PRACTITIONER

## 2022-09-26 PROCEDURE — 4004F PT TOBACCO SCREEN RCVD TLK: CPT | Performed by: NURSE PRACTITIONER

## 2022-09-26 RX ORDER — PREDNISONE 20 MG/1
40 TABLET ORAL DAILY
Qty: 10 TABLET | Refills: 0 | Status: SHIPPED | OUTPATIENT
Start: 2022-09-26 | End: 2022-10-01

## 2022-09-26 RX ORDER — DOXYCYCLINE 100 MG/1
100 CAPSULE ORAL 2 TIMES DAILY
Qty: 14 CAPSULE | Refills: 0 | Status: SHIPPED | OUTPATIENT
Start: 2022-09-26 | End: 2022-10-03

## 2022-09-26 NOTE — PROGRESS NOTES
Chief Complaint:   URI (Started 9/16/22. Sinus congestion, chest congestion, cough, major headaches, body aches, ear pain worse on the right, began with a scratchy throat, post nasal drip, runny nose. Thinks it is her allergies.)      History of Present Illness   Source of history provided by:  patientLee Kennedy is a 57 y.o. old female with a past medical history of:   Past Medical History:   Diagnosis Date    Arthritis     Diverticulitis     GERD (gastroesophageal reflux disease)     Hyperlipidemia     Hypertension     Hypothyroidism     Presents to the walk in clinic for evaluation of sinus pressure, nasal congestion, productive cough, chest congestion and chills x 10 days. According to Ita Shrestha, prior to symptoms beginning she mowed her lawn and began to experience her typical allergy symptoms. She reports that she typically rinses her sinuses after mowing her lawn but did not do it this time. Today, she reports a rapidly worsening symptoms and voices concerns \"it is in my chest, I have a deep congested cough. I am coughing up mucus and wheezing. \"  Itajake Shrestha is also complaining that her sinuses are tender and \"my teeth are hurting from sinus pressure\". She reports a history of childhood asthma in which she reports she no longer requires treatment for. She denies a history of COPD. She has been using over-the-counter cough and cold plus allergy medication and Flonase without improvement of her symptoms. Review history shows that she was recently treated for sinusitis in August, Ita Shrestha reports symptoms completely resolved. Ita Henrietta denies a concern for COVID-19 and has tested herself at home reporting negative results. Denies any fever,  CP, SOB, or GI symptoms.    ROS   Unless otherwise stated in this report or unable to obtain because of the patient's clinical or mental status as evidenced by the medical record, this patients's positive and negative responses for Review of Systems, constitutional, psych, eyes, ENT, cardiovascular, respiratory, gastrointestinal, neurological, genitourinary, musculoskeletal, integument systems and systems related to the presenting problem are either stated in the preceding or were not pertinent or were negative for the symptoms and/or complaints related to the medical problem. Past Surgical History:  has a past surgical history that includes  section; Cholecystectomy; Colonoscopy (); and Colonoscopy (N/A, 2018). Social History:  reports that she has been smoking cigarettes. She has a 30.00 pack-year smoking history. She has never used smokeless tobacco. She reports current alcohol use of about 2.0 standard drinks per week. She reports that she does not use drugs. Family History: family history includes Heart Disease in her father and mother; Ovarian Cancer in her sister. Allergies: Alendronate sodium and Ace inhibitors    Physical Exam       VS:  /83 (Site: Right Upper Arm, Position: Sitting, Cuff Size: Medium Adult)   Pulse 81   Temp 97.9 °F (36.6 °C) (Oral)   Resp 18   Ht 4' 11\" (1.499 m)   Wt 147 lb (66.7 kg)   SpO2 97%   BMI 29.69 kg/m²        Constitutional:  Alert, development consistent with age. Head: TTP over the bilateral maxillary and frontal sinuses. Ears:   Bilateral pinna normal. TMs  without erythema or perforation bilaterally. Canals normal bilaterally without swelling or exudate  Nose: No congestion of the nasal mucosa. There is  injection to middle turbinates bilaterally. Throat: Minimal posterior pharyngeal erythema with mild post nasal drip present. No exudate or tonsillar hypertrophy noted. Neck:  Supple. There is no anterior cervical adenopathy. Lungs: Wheezing and rales noted to the right upper posterior fields. Cough is harsh and congested. Heart:  Regular rate and rhythm, normal heart sounds, without ectopy, gallops, or rubs. Skin:  Normal turgor. Warm, dry, without visible rash.   Neurological:  Alert and oriented. Motor functions intact. Lab / Imaging Results   (All laboratory and radiology results have been personally reviewed by myself)  Labs:  No results found for this visit on 09/26/22. Assessment / Plan   Impression(s): Ola Scheuermann was seen today for uri. Diagnoses and all orders for this visit:    Bronchitis with acute wheezing  -     doxycycline monohydrate (MONODOX) 100 MG capsule; Take 1 capsule by mouth 2 times daily for 7 days  -     predniSONE (DELTASONE) 20 MG tablet; Take 2 tablets by mouth daily for 5 days    Acute pansinusitis, recurrence not specified  -     doxycycline monohydrate (MONODOX) 100 MG capsule; Take 1 capsule by mouth 2 times daily for 7 days      -Treating today with concern for bacterial sinusitis with co - occurring bronchitis with doxycycline and prednisone burst to help with symptoms; administration and side effects discussed. Encourage that patient take with a probiotic and examples provided. - Symptomatic treatment discussed including over-the-counter acetaminophen as labeled as needed for pain/fever. Good oral hydration. Diet as tolerated. - Follow-up with PCP if no improvement. ED for any worsening or concern. AIDEE Ugarte - CNP    This visit was provided as a focused evaluation during the [place] -19 pandemic/national emergency. A comprehensive review of all previous patient history and testing was not conducted.   Pertinent findings were elicited during the visit. [phone]

## 2022-09-26 NOTE — PATIENT INSTRUCTIONS
SURVEY:    You may be receiving a survey from [employer] regarding your visit today. Please complete the survey to enable us to provide the highest quality of care to you and your family. If you cannot score us a very good on any question, please call the office to discuss how we could of made your experience a very good one. Thank you for letting us take care of you today. We hope all your questions were addressed. If a question was overlooked or something else comes to mind after you return home, please contact a member of your Care Team listed below.     Thank you,  Farhan Ramirez MA      Your Care Team at [address] [facility]  Provider- ROSSI Baca  Provider- ROSSI Patel  [address] [address]  Reception- [place], [place]      Walk-in contact numbers:       Phone: [phone]                 Fax: [phone]    Jayda Edmonds Hours:  Mon-Thurs: 9:00 am - 5:30 pm     Friday: 8:00 am - 12:00 pm           Sat-Sun: CLOSED

## 2022-10-04 ENCOUNTER — OFFICE VISIT (OUTPATIENT)
Dept: FAMILY MEDICINE CLINIC | Age: 62
End: 2022-10-04
Payer: COMMERCIAL

## 2022-10-04 VITALS
DIASTOLIC BLOOD PRESSURE: 78 MMHG | BODY MASS INDEX: 29.64 KG/M2 | SYSTOLIC BLOOD PRESSURE: 120 MMHG | HEART RATE: 84 BPM | WEIGHT: 147 LBS | HEIGHT: 59 IN

## 2022-10-04 DIAGNOSIS — J01.90 ACUTE BACTERIAL SINUSITIS: Primary | ICD-10-CM

## 2022-10-04 DIAGNOSIS — B96.89 ACUTE BACTERIAL SINUSITIS: Primary | ICD-10-CM

## 2022-10-04 DIAGNOSIS — J30.2 SEASONAL ALLERGIC RHINITIS, UNSPECIFIED TRIGGER: ICD-10-CM

## 2022-10-04 DIAGNOSIS — H69.91 EUSTACHIAN TUBE DISORDER, RIGHT: ICD-10-CM

## 2022-10-04 PROCEDURE — G8419 CALC BMI OUT NRM PARAM NOF/U: HCPCS | Performed by: INTERNAL MEDICINE

## 2022-10-04 PROCEDURE — G8484 FLU IMMUNIZE NO ADMIN: HCPCS | Performed by: INTERNAL MEDICINE

## 2022-10-04 PROCEDURE — 96372 THER/PROPH/DIAG INJ SC/IM: CPT | Performed by: INTERNAL MEDICINE

## 2022-10-04 PROCEDURE — 3017F COLORECTAL CA SCREEN DOC REV: CPT | Performed by: INTERNAL MEDICINE

## 2022-10-04 PROCEDURE — 99213 OFFICE O/P EST LOW 20 MIN: CPT | Performed by: INTERNAL MEDICINE

## 2022-10-04 PROCEDURE — G8427 DOCREV CUR MEDS BY ELIG CLIN: HCPCS | Performed by: INTERNAL MEDICINE

## 2022-10-04 PROCEDURE — 4004F PT TOBACCO SCREEN RCVD TLK: CPT | Performed by: INTERNAL MEDICINE

## 2022-10-04 RX ORDER — TRIAMCINOLONE ACETONIDE 40 MG/ML
60 INJECTION, SUSPENSION INTRA-ARTICULAR; INTRAMUSCULAR ONCE
Status: COMPLETED | OUTPATIENT
Start: 2022-10-04 | End: 2022-10-04

## 2022-10-04 RX ORDER — AMOXICILLIN AND CLAVULANATE POTASSIUM 875; 125 MG/1; MG/1
1 TABLET, FILM COATED ORAL 2 TIMES DAILY
Qty: 20 TABLET | Refills: 0 | Status: SHIPPED | OUTPATIENT
Start: 2022-10-04 | End: 2022-10-14

## 2022-10-04 RX ADMIN — TRIAMCINOLONE ACETONIDE 60 MG: 40 INJECTION, SUSPENSION INTRA-ARTICULAR; INTRAMUSCULAR at 11:28

## 2022-10-04 SDOH — ECONOMIC STABILITY: FOOD INSECURITY: WITHIN THE PAST 12 MONTHS, YOU WORRIED THAT YOUR FOOD WOULD RUN OUT BEFORE YOU GOT MONEY TO BUY MORE.: NEVER TRUE

## 2022-10-04 SDOH — ECONOMIC STABILITY: FOOD INSECURITY: WITHIN THE PAST 12 MONTHS, THE FOOD YOU BOUGHT JUST DIDN'T LAST AND YOU DIDN'T HAVE MONEY TO GET MORE.: NEVER TRUE

## 2022-10-04 ASSESSMENT — ENCOUNTER SYMPTOMS
CONSTIPATION: 0
SORE THROAT: 0
SHORTNESS OF BREATH: 0
ABDOMINAL PAIN: 0
SINUS PAIN: 1
DIARRHEA: 0
RHINORRHEA: 0
NAUSEA: 0
COUGH: 0
CHEST TIGHTNESS: 0
BLOOD IN STOOL: 0

## 2022-10-04 ASSESSMENT — SOCIAL DETERMINANTS OF HEALTH (SDOH): HOW HARD IS IT FOR YOU TO PAY FOR THE VERY BASICS LIKE FOOD, HOUSING, MEDICAL CARE, AND HEATING?: NOT VERY HARD

## 2022-10-04 NOTE — PATIENT INSTRUCTIONS
Survey: You may be receiving a survey from [employer] regarding your visit today. You may get this in the mail, through your MyChart or in your email. Please complete the survey to enable us to provide the highest quality of care to you and your family. Please also, mention our names. If you cannot score us as very good (5 Stars) on any question, please feel free to call the office to discuss how we could have made your experience exceptional.      Thank You! MD Jh Toribio, Karen Graves, MELN YVETTE    [facility], [address] [place] [address]       1. Acute bacterial sinusitis  Will change to Augmentin 875 mg BID to help cover anaerobic bacteria better. Recommend nasal lavage two times a day. - amoxicillin-clavulanate (AUGMENTIN) 875-125 MG per tablet; Take 1 tablet by mouth 2 times daily for 10 days  Dispense: 20 tablet; Refill: 0    2. Seasonal allergic rhinitis, unspecified trigger  Ariadne Cruz was given an intramuscular injection of 60 mg of kenalog .    - triamcinolone acetonide (KENALOG-40) injection 60 mg    3. Eustachian tube disorder, right  Use Flonase 2 puffs in the right side of the nose daily until resolved. - triamcinolone acetonide (KENALOG-40) injection 60 mg    Ariadne Cruz is instructed to return to the clinic if the symptoms continue or worsen. Ariadne Cruz  was also instructed to go to the emergency room department if the symptoms significantly worsen before an appointment can be made.

## 2022-10-04 NOTE — PROGRESS NOTES
Oliver Randall (:  1960) is a 57 y.o. female,Established patient, here for evaluation of the following chief complaint(s):  Otalgia (Recurring ear pain) and URI (Recurring sinus/uri sx's. Cough, sore throat, sinus drainage, chest congestion. Denies fever)         ASSESSMENT/PLAN:  1. Acute bacterial sinusitis  -     amoxicillin-clavulanate (AUGMENTIN) 875-125 MG per tablet; Take 1 tablet by mouth 2 times daily for 10 days, Disp-20 tablet, R-0Normal  2. Seasonal allergic rhinitis, unspecified trigger  -     triamcinolone acetonide (KENALOG-40) injection 60 mg; 60 mg, IntraMUSCular, ONCE, 1 dose, On Tue 10/4/22 at 1130  3. Eustachian tube disorder, right  -     triamcinolone acetonide (KENALOG-40) injection 60 mg; 60 mg, IntraMUSCular, ONCE, 1 dose, On Tue 10/4/22 at 1130      Plan:  1. Acute bacterial sinusitis  Will change to Augmentin 875 mg BID to help cover anaerobic bacteria better. Recommend nasal lavage two times a day. - amoxicillin-clavulanate (AUGMENTIN) 875-125 MG per tablet; Take 1 tablet by mouth 2 times daily for 10 days  Dispense: 20 tablet; Refill: 0    2. Seasonal allergic rhinitis, unspecified trigger  Angelia Ellis was given an intramuscular injection of 60 mg of kenalog .    - triamcinolone acetonide (KENALOG-40) injection 60 mg    3. Eustachian tube disorder, right  Use Flonase 2 puffs in the right side of the nose daily until resolved. - triamcinolone acetonide (KENALOG-40) injection 60 mg    Angelia Ellis is instructed to return to the clinic if the symptoms continue or worsen. Angelia Guile  was also instructed to go to the emergency room department if the symptoms significantly worsen before an appointment can be made. Subjective   SUBJECTIVE/OBJECTIVE:  URI   This is a new problem. The current episode started 1 to 4 weeks ago. The problem has been unchanged. There has been no fever. Associated symptoms include congestion, ear pain and sinus pain.  Pertinent negatives include no abdominal pain, chest pain, coughing, diarrhea, dysuria, nausea, neck pain, rhinorrhea, sneezing or sore throat. Treatments tried: Doxycycline and Prednisone. The treatment provided no relief. Review of Systems   Constitutional: Negative. HENT:  Positive for congestion, ear pain and sinus pain. Negative for rhinorrhea, sneezing and sore throat. Eyes:  Negative for visual disturbance. Respiratory:  Negative for cough, chest tightness and shortness of breath. Cardiovascular:  Negative for chest pain and palpitations. Gastrointestinal:  Negative for abdominal pain, blood in stool, constipation, diarrhea and nausea. Genitourinary:  Negative for difficulty urinating, dysuria, frequency, menstrual problem and urgency. Musculoskeletal:  Negative for arthralgias, joint swelling, myalgias and neck pain. Skin: Negative. Neurological:  Negative for syncope. Psychiatric/Behavioral: Negative. Objective   Physical Exam  Constitutional:       Appearance: She is well-developed. HENT:      Head: Atraumatic. Comments: Pain over the sinuses with palpation. Ears:      Comments: Right TM with bulging. Nose: Congestion present. Eyes:      Conjunctiva/sclera: Conjunctivae normal.   Cardiovascular:      Rate and Rhythm: Normal rate and regular rhythm. Heart sounds: Normal heart sounds. Pulmonary:      Effort: Pulmonary effort is normal.      Breath sounds: Normal breath sounds. Abdominal:      Palpations: Abdomen is soft. Tenderness: There is no abdominal tenderness. Musculoskeletal:         General: Normal range of motion. Cervical back: Normal range of motion and neck supple. Lymphadenopathy:      Cervical: No cervical adenopathy. Skin:     Findings: No rash. Neurological:      Mental Status: She is alert. Psychiatric:         Behavior: Behavior normal.         Thought Content:  Thought content normal.                An electronic signature was used to authenticate this note.     --Bon Coleman MD

## 2022-10-10 DIAGNOSIS — K21.9 GASTROESOPHAGEAL REFLUX DISEASE WITHOUT ESOPHAGITIS: ICD-10-CM

## 2022-10-10 RX ORDER — OMEPRAZOLE 20 MG/1
CAPSULE, DELAYED RELEASE ORAL
Qty: 30 CAPSULE | Refills: 2 | Status: SHIPPED | OUTPATIENT
Start: 2022-10-10

## 2022-10-27 ENCOUNTER — HOSPITAL ENCOUNTER (OUTPATIENT)
Age: 62
Discharge: HOME | End: 2022-10-27
Payer: COMMERCIAL

## 2022-10-27 DIAGNOSIS — E78.00 HYPERCHOLESTEREMIA: ICD-10-CM

## 2022-10-27 DIAGNOSIS — I10 BENIGN ESSENTIAL HTN: ICD-10-CM

## 2022-10-27 DIAGNOSIS — R73.03 PRE-DIABETES: ICD-10-CM

## 2022-10-27 LAB
ALBUMIN SERPL-MCNC: 4.4 G/DL (ref 3.5–5.2)
ALP BLD-CCNC: 107 U/L (ref 35–104)
ALT SERPL-CCNC: 16 U/L (ref 5–33)
ANION GAP SERPL CALCULATED.3IONS-SCNC: 11 MMOL/L (ref 9–17)
AST SERPL-CCNC: 17 U/L
BILIRUB SERPL-MCNC: 0.6 MG/DL (ref 0.3–1.2)
BUN BLDV-MCNC: 12 MG/DL (ref 8–23)
BUN/CREAT BLD: 18 (ref 9–20)
CALCIUM SERPL-MCNC: 9.5 MG/DL (ref 8.6–10.4)
CHLORIDE BLD-SCNC: 96 MMOL/L (ref 98–107)
CHOLESTEROL/HDL RATIO: 2.7
CHOLESTEROL: 186 MG/DL
CO2: 29 MMOL/L (ref 20–31)
CREAT SERPL-MCNC: 0.65 MG/DL (ref 0.5–0.9)
ESTIMATED AVERAGE GLUCOSE: 123 MG/DL
GFR SERPL CREATININE-BSD FRML MDRD: >60 ML/MIN/1.73M2
GLUCOSE BLD-MCNC: 129 MG/DL (ref 70–99)
HBA1C MFR BLD: 5.9 % (ref 4–6)
HDLC SERPL-MCNC: 68 MG/DL
LDL CHOLESTEROL: 88 MG/DL (ref 0–130)
PATIENT FASTING?: YES
POTASSIUM SERPL-SCNC: 4.3 MMOL/L (ref 3.7–5.3)
SODIUM BLD-SCNC: 136 MMOL/L (ref 135–144)
TOTAL PROTEIN: 7.8 G/DL (ref 6.4–8.3)
TRIGL SERPL-MCNC: 148 MG/DL

## 2022-10-27 PROCEDURE — 83036 HEMOGLOBIN GLYCOSYLATED A1C: CPT

## 2022-10-27 PROCEDURE — 80053 COMPREHEN METABOLIC PANEL: CPT

## 2022-10-27 PROCEDURE — 80061 LIPID PANEL: CPT

## 2022-10-27 PROCEDURE — 36415 COLL VENOUS BLD VENIPUNCTURE: CPT

## 2022-10-28 ENCOUNTER — OFFICE VISIT (OUTPATIENT)
Dept: FAMILY MEDICINE CLINIC | Age: 62
End: 2022-10-28
Payer: COMMERCIAL

## 2022-10-28 VITALS
SYSTOLIC BLOOD PRESSURE: 134 MMHG | BODY MASS INDEX: 28.63 KG/M2 | DIASTOLIC BLOOD PRESSURE: 80 MMHG | WEIGHT: 142 LBS | HEIGHT: 59 IN

## 2022-10-28 DIAGNOSIS — Z23 NEED FOR INFLUENZA VACCINATION: ICD-10-CM

## 2022-10-28 DIAGNOSIS — E03.9 ACQUIRED HYPOTHYROIDISM: ICD-10-CM

## 2022-10-28 DIAGNOSIS — K21.9 GASTROESOPHAGEAL REFLUX DISEASE WITHOUT ESOPHAGITIS: ICD-10-CM

## 2022-10-28 DIAGNOSIS — I10 BENIGN ESSENTIAL HTN: Primary | ICD-10-CM

## 2022-10-28 DIAGNOSIS — R73.9 HYPERGLYCEMIA: ICD-10-CM

## 2022-10-28 DIAGNOSIS — E78.2 MIXED HYPERCHOLESTEROLEMIA AND HYPERTRIGLYCERIDEMIA: ICD-10-CM

## 2022-10-28 DIAGNOSIS — F41.8 DEPRESSION WITH ANXIETY: ICD-10-CM

## 2022-10-28 PROCEDURE — 99214 OFFICE O/P EST MOD 30 MIN: CPT | Performed by: INTERNAL MEDICINE

## 2022-10-28 PROCEDURE — 3078F DIAST BP <80 MM HG: CPT | Performed by: INTERNAL MEDICINE

## 2022-10-28 PROCEDURE — 4004F PT TOBACCO SCREEN RCVD TLK: CPT | Performed by: INTERNAL MEDICINE

## 2022-10-28 PROCEDURE — 90674 CCIIV4 VAC NO PRSV 0.5 ML IM: CPT | Performed by: INTERNAL MEDICINE

## 2022-10-28 PROCEDURE — G8427 DOCREV CUR MEDS BY ELIG CLIN: HCPCS | Performed by: INTERNAL MEDICINE

## 2022-10-28 PROCEDURE — G8482 FLU IMMUNIZE ORDER/ADMIN: HCPCS | Performed by: INTERNAL MEDICINE

## 2022-10-28 PROCEDURE — G8419 CALC BMI OUT NRM PARAM NOF/U: HCPCS | Performed by: INTERNAL MEDICINE

## 2022-10-28 PROCEDURE — 3017F COLORECTAL CA SCREEN DOC REV: CPT | Performed by: INTERNAL MEDICINE

## 2022-10-28 PROCEDURE — 90471 IMMUNIZATION ADMIN: CPT | Performed by: INTERNAL MEDICINE

## 2022-10-28 PROCEDURE — 3074F SYST BP LT 130 MM HG: CPT | Performed by: INTERNAL MEDICINE

## 2022-10-28 ASSESSMENT — ENCOUNTER SYMPTOMS
SHORTNESS OF BREATH: 0
ABDOMINAL PAIN: 0
SORE THROAT: 0
NAUSEA: 0
COUGH: 0
CONSTIPATION: 0
DIARRHEA: 0
RHINORRHEA: 0
CHEST TIGHTNESS: 0
BLOOD IN STOOL: 0

## 2022-10-28 NOTE — PROGRESS NOTES
Arturo Veras (:  1960) is a 57 y.o. female,Established patient, here for evaluation of the following chief complaint(s):  Hypertension (Check up, flu vaccine), Hyperlipidemia, Mental Health Problem, Anxiety, and Gastroesophageal Reflux         ASSESSMENT/PLAN:  1. Benign essential HTN  -     Comprehensive Metabolic Panel; Future  2. Need for influenza vaccination  -     Influenza, FLUCELVAX, (age 10 mo+), IM, Preservative Free, 0.5 mL  3. Gastroesophageal reflux disease without esophagitis  4. Depression with anxiety  5. Acquired hypothyroidism  -     TSH with Reflex; Future  6. Hyperglycemia  -     Comprehensive Metabolic Panel; Future  -     Hemoglobin A1C; Future  7. Mixed hypercholesterolemia and hypertriglyceridemia  -     Lipid Panel; Future      Plan:  1. Need for influenza vaccination  [facility] was given an influenza vaccine today. - Influenza, FLUCELVAX, (age 10 mo+), IM, Preservative Free, 0.5 mL    2. Benign essential HTN  Controlled on HCTZ and Amlodipine. No change in medication.  - Comprehensive Metabolic Panel; Future    3. Gastroesophageal reflux disease without esophagitis  Controlled on Prilosec. Unable to wean off.    4. Depression with anxiety  Controlled on Zoloft. 5. Acquired hypothyroidism  Last TSH was normal off medication. Repeat in 6 months.  - TSH with Reflex; Future    6. Hyperglycemia  HgbA1C 5.9. Continue to watch a diabetic diet. HgbA1C in 6 months. - Comprehensive Metabolic Panel; Future  - Hemoglobin A1C; Future    7. Mixed hypercholesterolemia and hypertriglyceridemia  Controlled on Lipitor. No change in medication. Obtain labs approximately one week prior to the office appointment. Please fast for 12 hours prior to obtaining the labs. Water or black coffee (no cream or sugar) is allowed prior to the the labs. - Lipid Panel;  Future      [facility] was instructed to follow up in the clinic in 6 months for check up or as needed with any medical issues. Subjective   SUBJECTIVE/OBJECTIVE:  [facility] presents for a check up on her medical conditions HTN, Hyperlipidemia, depression / anxiety, GERD. [facility] denies new problems. Medications were reviewed with [facility], she is  tolerating the medication. Bowels are regular. There has not been rectal bleeding. [facility] denies urinary complications, the urine stream is good. [facility] denies chest pain and denies increasing shortness of breath. Labs from 10/22 reviewed. Depression / anxiety is well controlled on Zoloft. GERD well controlled on Prilosec. GERD returns off medication. Past Medical History:  No date: Arthritis  No date: Diverticulitis  No date: GERD (gastroesophageal reflux disease)  No date: Hyperlipidemia  No date: Hypertension  No date: Hypothyroidism    Past Surgical History:  No date:  SECTION  No date: CHOLECYSTECTOMY  2008: COLONOSCOPY  2018: COLONOSCOPY; N/A      Comment:  Dr. Steve Dennison:  2 adenomatous polyps. Social History    Socioeconomic History      Marital status:       Spouse name: Carolyne David      Number of children: 1      Years of education: 12      Highest education level: High school graduate    Occupational History      Not on file    Tobacco Use      Smoking status: Every Day        Packs/day: 1.00        Years: 30.00        Pack years: 30        Types: Cigarettes      Smokeless tobacco: Never    Substance and Sexual Activity      Alcohol use:  Yes        Alcohol/week: 2.0 standard drinks        Types: 2 Shots of liquor per week      Drug use: No      Sexual activity: Yes        Partners: Male    Other Topics      Concerns:        Not on file    Social History Narrative      Not on file    Social Determinants of Health  Financial Resource Strain: Low Risk       Difficulty of Paying Living Expenses: Not very hard  Food Insecurity: No Food Insecurity      Worried About Running Out of Food in the Last Year: Never true      Ran Out of Food in the Last Year: Never true  Transportation Needs: Not on file  Physical Activity: Not on file  Stress: Not on file  Social Connections: Not on file  Intimate Partner Violence: Not on file  Housing Stability: Not on file    Review of patient's family history indicates:  Problem: Heart Disease      Relation: Mother          Age of Onset: (Not Specified)  Problem: Heart Disease      Relation: Father          Age of Onset: (Not Specified)  Problem: Ovarian Cancer      Relation: Sister          Age of Onset: (Not Specified)      Current Outpatient Medications on File Prior to Visit:  omeprazole (PRILOSEC) 20 MG delayed release capsule, TAKE ONE CAPSULE BY MOUTH DAILY, Disp: 30 capsule, Rfl: 2  amLODIPine (NORVASC) 5 MG tablet, TAKE ONE TABLET BY MOUTH DAILY, Disp: 30 tablet, Rfl: 5  atorvastatin (LIPITOR) 40 MG tablet, TAKE ONE TABLET BY MOUTH DAILY, Disp: 30 tablet, Rfl: 2  hydroCHLOROthiazide (HYDRODIURIL) 25 MG tablet, TAKE ONE TABLET BY MOUTH DAILY, Disp: 30 tablet, Rfl: 5  sertraline (ZOLOFT) 50 MG tablet, TAKE ONE TABLET BY MOUTH DAILY, Disp: 30 tablet, Rfl: 5  fluticasone (FLONASE) 50 MCG/ACT nasal spray, SPRAY TWO SPRAYS IN EACH NOSTRIL ONCE DAILY, Disp: 1 each, Rfl: 2  KLOR-CON M20 20 MEQ extended release tablet, TAKE ONE TABLET BY MOUTH DAILY, Disp: 30 tablet, Rfl: 5  cetirizine (ZYRTEC) 10 MG tablet, Take 10 mg by mouth daily. , Disp: , Rfl:     No current facility-administered medications on file prior to visit. -- Alendronate Sodium -- Nausea Only    --  Body aches   -- Ace Inhibitors -- Other (See Comments)    --  Causes a cough.       Lab Results       Component                Value               Date                       NA                       136                 10/27/2022                 K                        4.3                 10/27/2022                 CL                       96 (L)              10/27/2022                 CO2                      29                  10/27/2022 BUN                      12                  10/27/2022                 CREATININE               0.65                10/27/2022                 GLUCOSE                  129 (H)             10/27/2022                 CALCIUM                  9.5                 10/27/2022                 PROT                     7.8                 10/27/2022                 LABALBU                  4.4                 10/27/2022                 BILITOT                  0.6                 10/27/2022                 ALKPHOS                  107 (H)             10/27/2022                 AST                      17                  10/27/2022                 ALT                      16                  10/27/2022                 LABGLOM                  >60                 10/27/2022                 GFRAA                    >60                 04/11/2022              Lab Results       Component                Value               Date                       LABA1C                   5.9                 10/27/2022            Lab Results       Component                Value               Date                       EAG                      123                 10/27/2022              Lab Results       Component                Value               Date                       CHOL                     186                 10/27/2022                 CHOL                     162                 04/11/2022                 CHOL                     199                 09/29/2021            Lab Results       Component                Value               Date                       TRIG                     148                 10/27/2022                 TRIG                     132                 04/11/2022                 TRIG                     202 (H)             09/29/2021            Lab Results       Component                Value               Date                       HDL                      68                  10/27/2022                 HDL 55                  04/11/2022                 HDL                      67                  09/29/2021            Lab Results       Component                Value               Date                       LDLCHOLESTEROL           88                  10/27/2022                 LDLCHOLESTEROL           81                  04/11/2022                 LDLCHOLESTEROL           92                  09/29/2021            Lab Results       Component                Value               Date                       VLDL                                         09/29/2021             NOT REPORTED (H)       VLDL                                         02/24/2021             NOT REPORTED (H)       VLDL                     NOT REPORTED        08/29/2020            Lab Results       Component                Value               Date                       CHOLHDLRATIO             2.7                 10/27/2022                 CHOLHDLRATIO             2.9                 04/11/2022                 CHOLHDLRATIO             3.0                 09/29/2021                                Hypertension  This is a chronic problem. The current episode started more than 1 year ago. The problem is unchanged. The problem is controlled. Pertinent negatives include no chest pain, neck pain, palpitations or shortness of breath. Risk factors for coronary artery disease include diabetes mellitus and dyslipidemia. Past treatments include calcium channel blockers and diuretics. The current treatment provides significant improvement. There are no compliance problems. There is no history of angina. Hyperlipidemia  This is a chronic problem. The current episode started more than 1 year ago. The problem is controlled. Recent lipid tests were reviewed and are normal. Pertinent negatives include no chest pain, myalgias or shortness of breath. Current antihyperlipidemic treatment includes statins.  The current treatment provides significant improvement of lipids. There are no compliance problems. Review of Systems   Constitutional: Negative. HENT:  Negative for congestion, ear pain, rhinorrhea, sneezing and sore throat. Eyes:  Negative for visual disturbance. Respiratory:  Negative for cough, chest tightness and shortness of breath. Cardiovascular:  Negative for chest pain and palpitations. Gastrointestinal:  Negative for abdominal pain, blood in stool, constipation, diarrhea and nausea. Genitourinary:  Negative for difficulty urinating, dysuria, frequency, menstrual problem and urgency. Musculoskeletal:  Negative for arthralgias, joint swelling, myalgias and neck pain. Skin: Negative. Neurological:  Negative for syncope. Psychiatric/Behavioral: Negative. Objective   Physical Exam  Constitutional:       Appearance: She is well-developed. HENT:      Head: Atraumatic. Eyes:      Conjunctiva/sclera: Conjunctivae normal.   Cardiovascular:      Rate and Rhythm: Normal rate and regular rhythm. Heart sounds: Normal heart sounds. Pulmonary:      Effort: Pulmonary effort is normal.      Breath sounds: Normal breath sounds. Abdominal:      Palpations: Abdomen is soft. Tenderness: There is no abdominal tenderness. Musculoskeletal:         General: Normal range of motion. Cervical back: Normal range of motion and neck supple. Lymphadenopathy:      Cervical: No cervical adenopathy. Skin:     Findings: No rash. Neurological:      Mental Status: She is alert. Psychiatric:         Behavior: Behavior normal.         Thought Content: Thought content normal.                An electronic signature was used to authenticate this note.     --Sarah Shaffer MD

## 2022-10-28 NOTE — PATIENT INSTRUCTIONS
Survey: You may be receiving a survey from [employer] regarding your visit today. You may get this in the mail, through your MyChart or in your email. Please complete the survey to enable us to provide the highest quality of care to you and your family. Please also, mention our names. If you cannot score us as very good (5 Stars) on any question, please feel free to call the office to discuss how we could have made your experience exceptional.      Thank You! MD Percy Umanzor, Rupinder Graves, BSN RN    Olga Lidia Holliday, [address] [place] [address]     1. Need for influenza vaccination  Dean Corrigan was given an influenza vaccine today. - Influenza, FLUCELVAX, (age 10 mo+), IM, Preservative Free, 0.5 mL    2. Benign essential HTN  Controlled on HCTZ and Amlodipine. No change in medication.  - Comprehensive Metabolic Panel; Future    3. Gastroesophageal reflux disease without esophagitis  Controlled on Prilosec. Unable to wean off.    4. Depression with anxiety  Controlled on Zoloft. 5. Acquired hypothyroidism  Last TSH was normal off medication. Repeat in 6 months.  - TSH with Reflex; Future    6. Hyperglycemia  HgbA1C 5.9. Continue to watch a diabetic diet. HgbA1C in 6 months. - Comprehensive Metabolic Panel; Future  - Hemoglobin A1C; Future    7. Mixed hypercholesterolemia and hypertriglyceridemia  Controlled on Lipitor. No change in medication. Obtain labs approximately one week prior to the office appointment. Please fast for 12 hours prior to obtaining the labs. Water or black coffee (no cream or sugar) is allowed prior to the the labs. - Lipid Panel; Future      Dean Corrigan was instructed to follow up in the clinic in 6 months for check up or as needed with any medical issues.

## 2022-10-28 NOTE — PROGRESS NOTES
Vaccine Information Sheet, \"Influenza - Inactivated\"  given to Aj Clement, or parent/legal guardian of  Aj Strickland and verbalized understanding. Patient responses:    Have you ever had a reaction to a flu vaccine? No  Are you able to eat eggs without adverse effects? Yes  Do you have any current illness? No  Have you ever had Guillian Rohwer Syndrome? No    Flu vaccine given per order. Please see immunization tab.

## 2022-11-11 ENCOUNTER — TELEPHONE (OUTPATIENT)
Dept: FAMILY MEDICINE CLINIC | Age: 62
End: 2022-11-11

## 2022-11-11 DIAGNOSIS — K57.92 DIVERTICULITIS: ICD-10-CM

## 2022-11-11 RX ORDER — ATORVASTATIN CALCIUM 40 MG/1
TABLET, FILM COATED ORAL
Qty: 30 TABLET | Refills: 2 | Status: SHIPPED | OUTPATIENT
Start: 2022-11-11

## 2022-11-11 RX ORDER — METRONIDAZOLE 500 MG/1
500 TABLET ORAL 3 TIMES DAILY
Qty: 30 TABLET | Refills: 0 | Status: SHIPPED | OUTPATIENT
Start: 2022-11-11 | End: 2022-11-21

## 2022-11-11 RX ORDER — CIPROFLOXACIN 500 MG/1
500 TABLET, FILM COATED ORAL 2 TIMES DAILY
Qty: 20 TABLET | Refills: 0 | Status: SHIPPED | OUTPATIENT
Start: 2022-11-11 | End: 2022-11-21

## 2022-11-11 NOTE — TELEPHONE ENCOUNTER
Patient called and complains of diverticulitis. She has been treated in the past for this. She requests antibiotic and if there is anything she can take for the pain.    Patient uses [employer] [facility] in [place]

## 2022-12-13 DIAGNOSIS — N95.9 POSTMENOPAUSAL SYMPTOMS: ICD-10-CM

## 2022-12-20 DIAGNOSIS — I10 BENIGN ESSENTIAL HTN: ICD-10-CM

## 2022-12-20 RX ORDER — HYDROCHLOROTHIAZIDE 25 MG/1
TABLET ORAL
Qty: 30 TABLET | Refills: 5 | Status: SHIPPED | OUTPATIENT
Start: 2022-12-20

## 2023-01-10 DIAGNOSIS — K21.9 GASTROESOPHAGEAL REFLUX DISEASE WITHOUT ESOPHAGITIS: ICD-10-CM

## 2023-01-10 RX ORDER — OMEPRAZOLE 20 MG/1
CAPSULE, DELAYED RELEASE ORAL
Qty: 30 CAPSULE | Refills: 5 | Status: SHIPPED | OUTPATIENT
Start: 2023-01-10

## 2023-02-13 DIAGNOSIS — E87.6 HYPOKALEMIA: ICD-10-CM

## 2023-02-13 RX ORDER — POTASSIUM CHLORIDE 1500 MG/1
TABLET, EXTENDED RELEASE ORAL
Qty: 30 TABLET | Refills: 5 | Status: SHIPPED | OUTPATIENT
Start: 2023-02-13

## 2023-02-13 NOTE — TELEPHONE ENCOUNTER
Health Maintenance   Topic Date Due    Cervical cancer screen  Never done    Shingles vaccine (1 of 2) Never done    Low dose CT lung screening  Never done    COVID-19 Vaccine (4 - Booster for Moderna series) 01/14/2022    DTaP/Tdap/Td vaccine (2 - Td or Tdap) 02/08/2023    Depression Monitoring  04/14/2023    Colorectal Cancer Screen  06/08/2023    A1C test (Diabetic or Prediabetic)  10/27/2023    Lipids  10/27/2023    Breast cancer screen  04/20/2024    Flu vaccine  Completed    Pneumococcal 0-64 years Vaccine  Completed    Hepatitis C screen  Completed    HIV screen  Completed    Hepatitis A vaccine  Aged Out    Hib vaccine  Aged Out    Meningococcal (ACWY) vaccine  Aged Out             (applicable per patient's age: Cancer Screenings, Depression Screening, Fall Risk Screening, Immunizations)    Hemoglobin A1C (%)   Date Value   10/27/2022 5.9   04/11/2022 6.0   08/31/2020 5.7     LDL Cholesterol (mg/dL)   Date Value   10/27/2022 88     AST (U/L)   Date Value   10/27/2022 17     ALT (U/L)   Date Value   10/27/2022 16     BUN (mg/dL)   Date Value   10/27/2022 12      (goal A1C is < 7)   (goal LDL is <100) need 30-50% reduction from baseline     BP Readings from Last 3 Encounters:   10/28/22 134/80   10/04/22 120/78   09/26/22 130/83    (goal /80)      All Future Testing planned in [facility]:  Lab Frequency Next Occurrence   Comprehensive Metabolic Panel Once 30/25/9805   Hemoglobin A1C Once 04/28/2023   Lipid Panel Once 04/28/2023   TSH with Reflex Once 04/28/2023       Next Visit Date:  Future Appointments   Date Time Provider Shae Isaac   4/28/2023  1:00 [facility]            Patient Active Problem List:     Hypercholesteremia     Depression with anxiety     GERD (gastroesophageal reflux disease)     Benign essential HTN     Postmenopausal symptoms     Seasonal allergies     Hashimoto's thyroiditis     Acquired hypothyroidism     Acute pansinusitis     Adenomatous polyp of colon     Allergic rhinitis     Diverticulitis of intestine     Facial pain     High thyroid stimulating hormone (TSH) level     Pain in right foot     Polyp of colon

## 2023-03-11 DIAGNOSIS — I10 BENIGN ESSENTIAL HTN: ICD-10-CM

## 2023-03-13 RX ORDER — AMLODIPINE BESYLATE 5 MG/1
TABLET ORAL
Qty: 30 TABLET | Refills: 5 | Status: SHIPPED | OUTPATIENT
Start: 2023-03-13

## 2023-03-30 RX ORDER — ATORVASTATIN CALCIUM 40 MG/1
40 TABLET, FILM COATED ORAL DAILY
Qty: 30 TABLET | Refills: 2 | Status: SHIPPED | OUTPATIENT
Start: 2023-03-30

## 2023-04-27 ENCOUNTER — HOSPITAL ENCOUNTER (OUTPATIENT)
Age: 63
Discharge: HOME | End: 2023-04-27
Payer: MEDICARE

## 2023-04-27 DIAGNOSIS — E03.9 ACQUIRED HYPOTHYROIDISM: ICD-10-CM

## 2023-04-27 DIAGNOSIS — E78.2 MIXED HYPERCHOLESTEROLEMIA AND HYPERTRIGLYCERIDEMIA: ICD-10-CM

## 2023-04-27 DIAGNOSIS — I10 BENIGN ESSENTIAL HTN: ICD-10-CM

## 2023-04-27 DIAGNOSIS — R73.9 HYPERGLYCEMIA: ICD-10-CM

## 2023-04-27 LAB
ALBUMIN SERPL-MCNC: 4.1 G/DL (ref 3.5–5.2)
ALP SERPL-CCNC: 96 U/L (ref 35–104)
ALT SERPL-CCNC: 14 U/L (ref 5–33)
ANION GAP SERPL CALCULATED.3IONS-SCNC: 11 MMOL/L (ref 9–17)
AST SERPL-CCNC: 14 U/L
BILIRUB SERPL-MCNC: 0.6 MG/DL (ref 0.3–1.2)
BUN SERPL-MCNC: 15 MG/DL (ref 8–23)
BUN/CREAT BLD: 24 (ref 9–20)
CALCIUM SERPL-MCNC: 9.3 MG/DL (ref 8.6–10.4)
CHLORIDE SERPL-SCNC: 99 MMOL/L (ref 98–107)
CHOLEST SERPL-MCNC: 172 MG/DL
CHOLESTEROL/HDL RATIO: 3.1
CO2 SERPL-SCNC: 26 MMOL/L (ref 20–31)
CREAT SERPL-MCNC: 0.63 MG/DL (ref 0.5–0.9)
EST. AVERAGE GLUCOSE BLD GHB EST-MCNC: 120 MG/DL
GFR SERPL CREATININE-BSD FRML MDRD: >60 ML/MIN/1.73M2
GLUCOSE SERPL-MCNC: 95 MG/DL (ref 70–99)
HBA1C MFR BLD: 5.8 % (ref 4–6)
HDLC SERPL-MCNC: 56 MG/DL
LDLC SERPL CALC-MCNC: 91 MG/DL (ref 0–130)
PATIENT FASTING?: YES
POTASSIUM SERPL-SCNC: 4.1 MMOL/L (ref 3.7–5.3)
PROT SERPL-MCNC: 7.3 G/DL (ref 6.4–8.3)
SODIUM SERPL-SCNC: 136 MMOL/L (ref 135–144)
TRIGL SERPL-MCNC: 123 MG/DL
TSH SERPL-ACNC: 3.8 UIU/ML (ref 0.3–5)

## 2023-04-27 PROCEDURE — 83036 HEMOGLOBIN GLYCOSYLATED A1C: CPT

## 2023-04-27 PROCEDURE — 36415 COLL VENOUS BLD VENIPUNCTURE: CPT

## 2023-04-27 PROCEDURE — 84443 ASSAY THYROID STIM HORMONE: CPT

## 2023-04-27 PROCEDURE — 80053 COMPREHEN METABOLIC PANEL: CPT

## 2023-04-27 PROCEDURE — 80061 LIPID PANEL: CPT

## 2023-04-28 ENCOUNTER — OFFICE VISIT (OUTPATIENT)
Dept: FAMILY MEDICINE CLINIC | Age: 63
End: 2023-04-28
Payer: MEDICARE

## 2023-04-28 VITALS
BODY MASS INDEX: 28.22 KG/M2 | HEART RATE: 88 BPM | DIASTOLIC BLOOD PRESSURE: 76 MMHG | SYSTOLIC BLOOD PRESSURE: 120 MMHG | HEIGHT: 59 IN | WEIGHT: 140 LBS

## 2023-04-28 DIAGNOSIS — Z12.31 OTHER SCREENING MAMMOGRAM: ICD-10-CM

## 2023-04-28 DIAGNOSIS — R73.9 HYPERGLYCEMIA: ICD-10-CM

## 2023-04-28 DIAGNOSIS — E03.9 ACQUIRED HYPOTHYROIDISM: ICD-10-CM

## 2023-04-28 DIAGNOSIS — K21.9 GASTROESOPHAGEAL REFLUX DISEASE WITHOUT ESOPHAGITIS: ICD-10-CM

## 2023-04-28 DIAGNOSIS — I10 BENIGN ESSENTIAL HTN: Primary | ICD-10-CM

## 2023-04-28 DIAGNOSIS — E78.00 HYPERCHOLESTEREMIA: ICD-10-CM

## 2023-04-28 PROCEDURE — 1090F PRES/ABSN URINE INCON ASSESS: CPT | Performed by: INTERNAL MEDICINE

## 2023-04-28 PROCEDURE — 99214 OFFICE O/P EST MOD 30 MIN: CPT | Performed by: INTERNAL MEDICINE

## 2023-04-28 PROCEDURE — G8427 DOCREV CUR MEDS BY ELIG CLIN: HCPCS | Performed by: INTERNAL MEDICINE

## 2023-04-28 PROCEDURE — G8399 PT W/DXA RESULTS DOCUMENT: HCPCS | Performed by: INTERNAL MEDICINE

## 2023-04-28 PROCEDURE — 3017F COLORECTAL CA SCREEN DOC REV: CPT | Performed by: INTERNAL MEDICINE

## 2023-04-28 PROCEDURE — 3074F SYST BP LT 130 MM HG: CPT | Performed by: INTERNAL MEDICINE

## 2023-04-28 PROCEDURE — 4004F PT TOBACCO SCREEN RCVD TLK: CPT | Performed by: INTERNAL MEDICINE

## 2023-04-28 PROCEDURE — 3078F DIAST BP <80 MM HG: CPT | Performed by: INTERNAL MEDICINE

## 2023-04-28 PROCEDURE — G8419 CALC BMI OUT NRM PARAM NOF/U: HCPCS | Performed by: INTERNAL MEDICINE

## 2023-04-28 PROCEDURE — 1123F ACP DISCUSS/DSCN MKR DOCD: CPT | Performed by: INTERNAL MEDICINE

## 2023-04-28 SDOH — ECONOMIC STABILITY: HOUSING INSECURITY
IN THE LAST 12 MONTHS, WAS THERE A TIME WHEN YOU DID NOT HAVE A STEADY PLACE TO SLEEP OR SLEPT IN A SHELTER (INCLUDING NOW)?: NO

## 2023-04-28 SDOH — ECONOMIC STABILITY: INCOME INSECURITY: HOW HARD IS IT FOR YOU TO PAY FOR THE VERY BASICS LIKE FOOD, HOUSING, MEDICAL CARE, AND HEATING?: NOT HARD AT ALL

## 2023-04-28 SDOH — ECONOMIC STABILITY: FOOD INSECURITY: WITHIN THE PAST 12 MONTHS, THE FOOD YOU BOUGHT JUST DIDN'T LAST AND YOU DIDN'T HAVE MONEY TO GET MORE.: NEVER TRUE

## 2023-04-28 SDOH — ECONOMIC STABILITY: FOOD INSECURITY: WITHIN THE PAST 12 MONTHS, YOU WORRIED THAT YOUR FOOD WOULD RUN OUT BEFORE YOU GOT MONEY TO BUY MORE.: NEVER TRUE

## 2023-04-28 ASSESSMENT — ENCOUNTER SYMPTOMS
SHORTNESS OF BREATH: 0
NAUSEA: 0
ABDOMINAL PAIN: 0
SORE THROAT: 0
DIARRHEA: 0
CONSTIPATION: 0
CHEST TIGHTNESS: 0
RHINORRHEA: 0
BLOOD IN STOOL: 0
COUGH: 0

## 2023-04-28 ASSESSMENT — PATIENT HEALTH QUESTIONNAIRE - PHQ9
8. MOVING OR SPEAKING SO SLOWLY THAT OTHER PEOPLE COULD HAVE NOTICED. OR THE OPPOSITE, BEING SO FIGETY OR RESTLESS THAT YOU HAVE BEEN MOVING AROUND A LOT MORE THAN USUAL: 0
1. LITTLE INTEREST OR PLEASURE IN DOING THINGS: 0
10. IF YOU CHECKED OFF ANY PROBLEMS, HOW DIFFICULT HAVE THESE PROBLEMS MADE IT FOR YOU TO DO YOUR WORK, TAKE CARE OF THINGS AT HOME, OR GET ALONG WITH OTHER PEOPLE: 0
SUM OF ALL RESPONSES TO PHQ QUESTIONS 1-9: 0
6. FEELING BAD ABOUT YOURSELF - OR THAT YOU ARE A FAILURE OR HAVE LET YOURSELF OR YOUR FAMILY DOWN: 0
5. POOR APPETITE OR OVEREATING: 0
4. FEELING TIRED OR HAVING LITTLE ENERGY: 0
SUM OF ALL RESPONSES TO PHQ QUESTIONS 1-9: 0
SUM OF ALL RESPONSES TO PHQ9 QUESTIONS 1 & 2: 0
7. TROUBLE CONCENTRATING ON THINGS, SUCH AS READING THE NEWSPAPER OR WATCHING TELEVISION: 0
SUM OF ALL RESPONSES TO PHQ QUESTIONS 1-9: 0
3. TROUBLE FALLING OR STAYING ASLEEP: 0
2. FEELING DOWN, DEPRESSED OR HOPELESS: 0
9. THOUGHTS THAT YOU WOULD BE BETTER OFF DEAD, OR OF HURTING YOURSELF: 0
SUM OF ALL RESPONSES TO PHQ QUESTIONS 1-9: 0

## 2023-04-28 NOTE — PROGRESS NOTES
Minerva Lyon (:  1960) is a 70 y.o. female,Established patient, here for evaluation of the following chief complaint(s):  Hypertension (Check up, review labs), Hyperlipidemia, Mental Health Problem (depression), Anxiety, and Gastroesophageal Reflux         ASSESSMENT/PLAN:  1. Benign essential HTN  -     Comprehensive Metabolic Panel; Future  2. Gastroesophageal reflux disease without esophagitis  3. Acquired hypothyroidism  4. Hypercholesteremia  -     Comprehensive Metabolic Panel; Future  -     Lipid Panel; Future  5. Hyperglycemia  -     Hemoglobin A1C; Future  6. Other screening mammogram  -     TAMIA RENNY DIGITAL SCREEN BILATERAL; Future      Plan:  1. Benign essential HTN  Controlled on Amlodipine and HCTZ. No change in medication.  - Comprehensive Metabolic Panel; Future    2. Gastroesophageal reflux disease without esophagitis  Controlled on Prilosec. Continue the current medication. 3. Acquired hypothyroidism  Controlled on Levothyroxine. No change in dose. 4. Hypercholesteremia  Controlled on Lipitor. No change in medication. Obtain labs approximately one week prior to the office appointment. Please fast for 12 hours prior to obtaining the labs. Water or black coffee (no cream or sugar) is allowed prior to the the labs. - Comprehensive Metabolic Panel; Future  - Lipid Panel; Future    5. Hyperglycemia  HgbA1C 5.8. Repeat in 6 months.  - Hemoglobin A1C; Future    6. Other screening mammogram  Set up for mammogram.    - TAMIA RENNY DIGITAL SCREEN BILATERAL; Future    Antonia Parson was instructed to follow up in the clinic in 6 months for check up or as needed with any medical issues. Subjective   SUBJECTIVE/OBJECTIVE:  Antonia Parson presents for a check up on her medical conditions HTN, Hyperlipidemia, Anxiety, GERD. Antonia Parson denies new problems. Medications were reviewed with Antonia Chauhangloria, she is  tolerating the medication. Bowels are regular. There has not been rectal bleeding.   Antonia Parson

## 2023-04-28 NOTE — PATIENT INSTRUCTIONS
Survey: You may be receiving a survey from [employer] regarding your visit today. You may get this in the mail, through your MyChart or in your email. Please complete the survey to enable us to provide the highest quality of care to you and your family. Please also, mention our names. If you cannot score us as very good (5 Stars) on any question, please feel free to call the office to discuss how we could have made your experience exceptional.      Thank You! Dr. Gail Cody MD    [facility], [address] [place] [address], MEL RomeroN YVETTE Pressley       Plan:  1. Benign essential HTN  Controlled on Amlodipine and HCTZ. No change in medication.  - Comprehensive Metabolic Panel; Future    2. Gastroesophageal reflux disease without esophagitis  Controlled on Prilosec. Continue the current medication. 3. Acquired hypothyroidism  Controlled on Levothyroxine. No change in dose. 4. Hypercholesteremia  Controlled on Lipitor. No change in medication. Obtain labs approximately one week prior to the office appointment. Please fast for 12 hours prior to obtaining the labs. Water or black coffee (no cream or sugar) is allowed prior to the the labs. - Comprehensive Metabolic Panel; Future  - Lipid Panel; Future    5. Hyperglycemia  HgbA1C 5.8. Repeat in 6 months.  - Hemoglobin A1C; Future    6. Other screening mammogram  Set up for mammogram.    - TAMIA RENNY DIGITAL SCREEN BILATERAL; Future    Donna Knott was instructed to follow up in the clinic in 6 months for check up or as needed with any medical issues.

## 2023-05-03 ENCOUNTER — HOSPITAL ENCOUNTER (OUTPATIENT)
Dept: MAMMOGRAPHY | Age: 63
Discharge: HOME | End: 2023-05-05
Payer: MEDICARE

## 2023-05-03 DIAGNOSIS — Z12.31 OTHER SCREENING MAMMOGRAM: ICD-10-CM

## 2023-05-03 PROCEDURE — 77067 SCR MAMMO BI INCL CAD: CPT

## 2023-06-26 RX ORDER — ATORVASTATIN CALCIUM 40 MG/1
TABLET, FILM COATED ORAL
Qty: 30 TABLET | Refills: 5 | Status: SHIPPED | OUTPATIENT
Start: 2023-06-26

## 2023-07-06 DIAGNOSIS — K21.9 GASTROESOPHAGEAL REFLUX DISEASE WITHOUT ESOPHAGITIS: ICD-10-CM

## 2023-07-10 RX ORDER — OMEPRAZOLE 20 MG/1
CAPSULE, DELAYED RELEASE ORAL
Qty: 30 CAPSULE | Refills: 5 | Status: SHIPPED | OUTPATIENT
Start: 2023-07-10

## 2023-07-10 NOTE — TELEPHONE ENCOUNTER
Health Maintenance   Topic Date Due    Cervical cancer screen  Never done    Shingles vaccine (1 of 2) Never done    Low dose CT lung screening &/or counseling  Never done    COVID-19 Vaccine (5 - Booster for Moderna series) 01/14/2022    DTaP/Tdap/Td vaccine (2 - Td or Tdap) 02/08/2023    Annual Wellness Visit (AWV)  Never done    Colorectal Cancer Screen  06/08/2023    Flu vaccine (1) 08/01/2023    A1C test (Diabetic or Prediabetic)  04/27/2024    Lipids  04/27/2024    Depression Monitoring  04/28/2024    Breast cancer screen  05/03/2025    DEXA (modify frequency per FRAX score)  Completed    Pneumococcal 65+ years Vaccine  Completed    Hepatitis C screen  Completed    HIV screen  Completed    Hepatitis A vaccine  Aged Out    Hib vaccine  Aged Out    Meningococcal (ACWY) vaccine  Aged Out    Pneumococcal 0-64 years Vaccine  Discontinued    Diabetes screen  Discontinued             (applicable per patient's age: Cancer Screenings, Depression Screening, Fall Risk Screening, Immunizations)    Hemoglobin A1C (%)   Date Value   04/27/2023 5.8   10/27/2022 5.9   04/11/2022 6.0     LDL Cholesterol (mg/dL)   Date Value   04/27/2023 91     AST (U/L)   Date Value   04/27/2023 14     ALT (U/L)   Date Value   04/27/2023 14     BUN (mg/dL)   Date Value   04/27/2023 15      (goal A1C is < 7)   (goal LDL is <100) need 30-50% reduction from baseline     BP Readings from Last 3 Encounters:   04/28/23 120/76   10/28/22 134/80   10/04/22 120/78    (goal /80)      All Future Testing planned in [facility]:  Lab Frequency Next Occurrence   Comprehensive Metabolic Panel Once 15/63/8328   Lipid Panel Once 10/28/2023   Hemoglobin A1C Once 10/28/2023       Next Visit Date:  Future Appointments   Date Time Provider [address]   10/30/2023  1:45 PM MD Melissa Ortez [facility]            Patient Active Problem List:     Hypercholesteremia     Depression with anxiety     GERD (gastroesophageal reflux disease)     Benign

## 2023-07-17 ENCOUNTER — TELEPHONE (OUTPATIENT)
Dept: FAMILY MEDICINE CLINIC | Age: 63
End: 2023-07-17

## 2023-07-17 DIAGNOSIS — B96.89 ACUTE BACTERIAL SINUSITIS: Primary | ICD-10-CM

## 2023-07-17 DIAGNOSIS — J01.90 ACUTE BACTERIAL SINUSITIS: Primary | ICD-10-CM

## 2023-07-17 RX ORDER — AZITHROMYCIN 250 MG/1
TABLET, FILM COATED ORAL
Qty: 1 PACKET | Refills: 0 | Status: SHIPPED | OUTPATIENT
Start: 2023-07-17 | End: 2023-07-27

## 2023-07-17 NOTE — TELEPHONE ENCOUNTER
Pt called in and states her head is plugged up, has a lot of green/yellow drainage. No appt available.  Asking for an antibiotic be sent to [facility]

## 2023-07-25 ENCOUNTER — OFFICE VISIT (OUTPATIENT)
Dept: FAMILY MEDICINE CLINIC | Age: 63
End: 2023-07-25
Payer: MEDICARE

## 2023-07-25 VITALS
DIASTOLIC BLOOD PRESSURE: 84 MMHG | BODY MASS INDEX: 29.03 KG/M2 | SYSTOLIC BLOOD PRESSURE: 118 MMHG | HEIGHT: 59 IN | HEART RATE: 90 BPM | WEIGHT: 144 LBS

## 2023-07-25 DIAGNOSIS — J01.90 ACUTE BACTERIAL SINUSITIS: Primary | ICD-10-CM

## 2023-07-25 DIAGNOSIS — I10 BENIGN ESSENTIAL HTN: ICD-10-CM

## 2023-07-25 DIAGNOSIS — B96.89 ACUTE BACTERIAL SINUSITIS: Primary | ICD-10-CM

## 2023-07-25 PROCEDURE — G8419 CALC BMI OUT NRM PARAM NOF/U: HCPCS | Performed by: INTERNAL MEDICINE

## 2023-07-25 PROCEDURE — 3017F COLORECTAL CA SCREEN DOC REV: CPT | Performed by: INTERNAL MEDICINE

## 2023-07-25 PROCEDURE — 1123F ACP DISCUSS/DSCN MKR DOCD: CPT | Performed by: INTERNAL MEDICINE

## 2023-07-25 PROCEDURE — 3079F DIAST BP 80-89 MM HG: CPT | Performed by: INTERNAL MEDICINE

## 2023-07-25 PROCEDURE — 1090F PRES/ABSN URINE INCON ASSESS: CPT | Performed by: INTERNAL MEDICINE

## 2023-07-25 PROCEDURE — 3074F SYST BP LT 130 MM HG: CPT | Performed by: INTERNAL MEDICINE

## 2023-07-25 PROCEDURE — 4004F PT TOBACCO SCREEN RCVD TLK: CPT | Performed by: INTERNAL MEDICINE

## 2023-07-25 PROCEDURE — G8399 PT W/DXA RESULTS DOCUMENT: HCPCS | Performed by: INTERNAL MEDICINE

## 2023-07-25 PROCEDURE — G8427 DOCREV CUR MEDS BY ELIG CLIN: HCPCS | Performed by: INTERNAL MEDICINE

## 2023-07-25 PROCEDURE — 99213 OFFICE O/P EST LOW 20 MIN: CPT | Performed by: INTERNAL MEDICINE

## 2023-07-25 RX ORDER — HYDROCHLOROTHIAZIDE 25 MG/1
25 TABLET ORAL DAILY
Qty: 30 TABLET | Refills: 5 | Status: SHIPPED | OUTPATIENT
Start: 2023-07-25

## 2023-07-25 RX ORDER — AMOXICILLIN AND CLAVULANATE POTASSIUM 875; 125 MG/1; MG/1
1 TABLET, FILM COATED ORAL 2 TIMES DAILY
Qty: 20 TABLET | Refills: 0 | Status: SHIPPED | OUTPATIENT
Start: 2023-07-25 | End: 2023-08-04

## 2023-07-25 RX ORDER — FLUCONAZOLE 150 MG/1
150 TABLET ORAL DAILY
Qty: 2 TABLET | Refills: 0 | Status: SHIPPED | OUTPATIENT
Start: 2023-07-25

## 2023-07-25 ASSESSMENT — ENCOUNTER SYMPTOMS
CONSTIPATION: 0
SINUS PRESSURE: 1
SHORTNESS OF BREATH: 0
DIARRHEA: 0
RHINORRHEA: 0
BLOOD IN STOOL: 0
NAUSEA: 0
ABDOMINAL PAIN: 0
CHEST TIGHTNESS: 0
COUGH: 1
SORE THROAT: 1

## 2023-07-25 NOTE — PATIENT INSTRUCTIONS
Survey: You may be receiving a survey from [employer] regarding your visit today. You may get this in the mail, through your MyChart or in your email. Please complete the survey to enable us to provide the highest quality of care to you and your family. Please also, mention our names. If you cannot score us as very good (5 Stars) on any question, please feel free to call the office to discuss how we could have made your experience exceptional.      Thank You! MD Hetal Tapia, [facility], [place] YVETTE [facility]       Plan:  1. Benign essential HTN    - hydroCHLOROthiazide (HYDRODIURIL) 25 MG tablet; Take 1 tablet by mouth daily  Dispense: 30 tablet; Refill: 5    2. Acute bacterial sinusitis  Start on Augmentin 875 mg two times a day x 10 days. Recommend Flonase 2 puffs in each side of the nose daily. - amoxicillin-clavulanate (AUGMENTIN) 875-125 MG per tablet; Take 1 tablet by mouth 2 times daily for 10 days  Dispense: 20 tablet; Refill: 0    [facility] is instructed to return to the clinic if the symptoms continue or worsen. [facility]  was also instructed to go to the emergency room department if the symptoms significantly worsen before an appointment can be made.

## 2023-07-25 NOTE — PROGRESS NOTES
Negative for chest pain and palpitations. Gastrointestinal:  Negative for abdominal pain, blood in stool, constipation, diarrhea and nausea. Genitourinary:  Negative for difficulty urinating, dysuria, frequency, menstrual problem and urgency. Musculoskeletal:  Negative for arthralgias, joint swelling, myalgias and neck pain. Skin: Negative. Neurological:  Positive for headaches. Negative for syncope. Psychiatric/Behavioral: Negative. Objective   Physical Exam  Constitutional:       Appearance: She is well-developed. HENT:      Head: Atraumatic. Nose: Congestion present. Eyes:      Conjunctiva/sclera: Conjunctivae normal.   Cardiovascular:      Rate and Rhythm: Normal rate and regular rhythm. Heart sounds: Normal heart sounds. Pulmonary:      Effort: Pulmonary effort is normal.      Breath sounds: Normal breath sounds. Abdominal:      Palpations: Abdomen is soft. Tenderness: There is no abdominal tenderness. Musculoskeletal:         General: Normal range of motion. Cervical back: Normal range of motion and neck supple. Lymphadenopathy:      Cervical: No cervical adenopathy. Skin:     Findings: No rash. Neurological:      Mental Status: She is alert. Psychiatric:         Behavior: Behavior normal.         Thought Content: Thought content normal.                An electronic signature was used to authenticate this note.     --Mayra Campos MD

## 2023-08-18 DIAGNOSIS — E87.6 HYPOKALEMIA: ICD-10-CM

## 2023-08-18 DIAGNOSIS — I10 BENIGN ESSENTIAL HTN: ICD-10-CM

## 2023-08-18 RX ORDER — AMLODIPINE BESYLATE 5 MG/1
5 TABLET ORAL DAILY
Qty: 30 TABLET | Refills: 3 | Status: SHIPPED | OUTPATIENT
Start: 2023-08-18

## 2023-08-18 RX ORDER — POTASSIUM CHLORIDE 20 MEQ/1
20 TABLET, EXTENDED RELEASE ORAL DAILY
Qty: 30 TABLET | Refills: 3 | Status: SHIPPED | OUTPATIENT
Start: 2023-08-18

## 2023-10-17 ENCOUNTER — TELEPHONE (OUTPATIENT)
Dept: FAMILY MEDICINE CLINIC | Age: 63
End: 2023-10-17

## 2023-10-17 DIAGNOSIS — B96.89 ACUTE BACTERIAL SINUSITIS: ICD-10-CM

## 2023-10-17 DIAGNOSIS — J01.90 ACUTE BACTERIAL SINUSITIS: ICD-10-CM

## 2023-10-17 RX ORDER — AMOXICILLIN AND CLAVULANATE POTASSIUM 875; 125 MG/1; MG/1
1 TABLET, FILM COATED ORAL 2 TIMES DAILY
Qty: 20 TABLET | Refills: 0 | Status: SHIPPED | OUTPATIENT
Start: 2023-10-17 | End: 2023-10-27

## 2023-10-17 NOTE — TELEPHONE ENCOUNTER
Pt called requesting an appt. No appt available. She ask if antibiotic can be sent to [facility] for sinus infection, Sinus congestion, low grade fever, nasal congestion.          Last OV: 7/25/2023    Next scheduled apt: 10/30/2023

## 2023-10-23 RX ORDER — ATORVASTATIN CALCIUM 40 MG/1
40 TABLET, FILM COATED ORAL DAILY
Qty: 90 TABLET | Refills: 1 | Status: SHIPPED | OUTPATIENT
Start: 2023-10-23

## 2023-10-30 ENCOUNTER — HOSPITAL ENCOUNTER (OUTPATIENT)
Age: 63
Discharge: HOME | End: 2023-10-30
Payer: MEDICARE

## 2023-10-30 ENCOUNTER — OFFICE VISIT (OUTPATIENT)
Dept: FAMILY MEDICINE CLINIC | Age: 63
End: 2023-10-30
Payer: MEDICARE

## 2023-10-30 VITALS
DIASTOLIC BLOOD PRESSURE: 80 MMHG | WEIGHT: 141 LBS | HEART RATE: 94 BPM | SYSTOLIC BLOOD PRESSURE: 134 MMHG | BODY MASS INDEX: 28.43 KG/M2 | HEIGHT: 59 IN

## 2023-10-30 DIAGNOSIS — I10 BENIGN ESSENTIAL HTN: ICD-10-CM

## 2023-10-30 DIAGNOSIS — E03.9 ACQUIRED HYPOTHYROIDISM: ICD-10-CM

## 2023-10-30 DIAGNOSIS — E87.6 HYPOKALEMIA: ICD-10-CM

## 2023-10-30 DIAGNOSIS — B96.89 ACUTE BACTERIAL SINUSITIS: Primary | ICD-10-CM

## 2023-10-30 DIAGNOSIS — R73.9 HYPERGLYCEMIA: ICD-10-CM

## 2023-10-30 DIAGNOSIS — E78.00 HYPERCHOLESTEREMIA: ICD-10-CM

## 2023-10-30 DIAGNOSIS — J01.90 ACUTE BACTERIAL SINUSITIS: Primary | ICD-10-CM

## 2023-10-30 LAB
ALBUMIN SERPL-MCNC: 4.4 G/DL (ref 3.5–5.2)
ALP SERPL-CCNC: 92 U/L (ref 35–104)
ALT SERPL-CCNC: 18 U/L (ref 5–33)
ANION GAP SERPL CALCULATED.3IONS-SCNC: 12 MMOL/L (ref 9–17)
AST SERPL-CCNC: 19 U/L
BILIRUB SERPL-MCNC: 0.7 MG/DL (ref 0.3–1.2)
BUN SERPL-MCNC: 16 MG/DL (ref 8–23)
BUN/CREAT SERPL: 27 (ref 9–20)
CALCIUM SERPL-MCNC: 9 MG/DL (ref 8.6–10.4)
CHLORIDE SERPL-SCNC: 97 MMOL/L (ref 98–107)
CHOLEST SERPL-MCNC: 211 MG/DL
CHOLESTEROL/HDL RATIO: 3.3
CO2 SERPL-SCNC: 27 MMOL/L (ref 20–31)
CREAT SERPL-MCNC: 0.6 MG/DL (ref 0.5–0.9)
EST. AVERAGE GLUCOSE BLD GHB EST-MCNC: 123 MG/DL
GFR SERPL CREATININE-BSD FRML MDRD: >60 ML/MIN/1.73M2
GLUCOSE SERPL-MCNC: 102 MG/DL (ref 70–99)
HBA1C MFR BLD: 5.9 % (ref 4–6)
HDLC SERPL-MCNC: 64 MG/DL
LDLC SERPL CALC-MCNC: 99 MG/DL (ref 0–130)
PATIENT FASTING?: YES
POTASSIUM SERPL-SCNC: 3.5 MMOL/L (ref 3.7–5.3)
PROT SERPL-MCNC: 7.9 G/DL (ref 6.4–8.3)
SODIUM SERPL-SCNC: 136 MMOL/L (ref 135–144)
TRIGL SERPL-MCNC: 238 MG/DL

## 2023-10-30 PROCEDURE — 1090F PRES/ABSN URINE INCON ASSESS: CPT | Performed by: INTERNAL MEDICINE

## 2023-10-30 PROCEDURE — 80053 COMPREHEN METABOLIC PANEL: CPT

## 2023-10-30 PROCEDURE — 83036 HEMOGLOBIN GLYCOSYLATED A1C: CPT

## 2023-10-30 PROCEDURE — 3017F COLORECTAL CA SCREEN DOC REV: CPT | Performed by: INTERNAL MEDICINE

## 2023-10-30 PROCEDURE — G8484 FLU IMMUNIZE NO ADMIN: HCPCS | Performed by: INTERNAL MEDICINE

## 2023-10-30 PROCEDURE — G8399 PT W/DXA RESULTS DOCUMENT: HCPCS | Performed by: INTERNAL MEDICINE

## 2023-10-30 PROCEDURE — G8427 DOCREV CUR MEDS BY ELIG CLIN: HCPCS | Performed by: INTERNAL MEDICINE

## 2023-10-30 PROCEDURE — 4004F PT TOBACCO SCREEN RCVD TLK: CPT | Performed by: INTERNAL MEDICINE

## 2023-10-30 PROCEDURE — 80061 LIPID PANEL: CPT

## 2023-10-30 PROCEDURE — G8419 CALC BMI OUT NRM PARAM NOF/U: HCPCS | Performed by: INTERNAL MEDICINE

## 2023-10-30 PROCEDURE — 3079F DIAST BP 80-89 MM HG: CPT | Performed by: INTERNAL MEDICINE

## 2023-10-30 PROCEDURE — 3075F SYST BP GE 130 - 139MM HG: CPT | Performed by: INTERNAL MEDICINE

## 2023-10-30 PROCEDURE — 1123F ACP DISCUSS/DSCN MKR DOCD: CPT | Performed by: INTERNAL MEDICINE

## 2023-10-30 PROCEDURE — 99214 OFFICE O/P EST MOD 30 MIN: CPT | Performed by: INTERNAL MEDICINE

## 2023-10-30 PROCEDURE — 36415 COLL VENOUS BLD VENIPUNCTURE: CPT

## 2023-10-30 RX ORDER — DOXYCYCLINE HYCLATE 100 MG
100 TABLET ORAL 2 TIMES DAILY
Qty: 20 TABLET | Refills: 0 | Status: SHIPPED | OUTPATIENT
Start: 2023-10-30 | End: 2023-11-09

## 2023-10-30 RX ORDER — FLUCONAZOLE 150 MG/1
150 TABLET ORAL DAILY
Qty: 2 TABLET | Refills: 0 | Status: SHIPPED | OUTPATIENT
Start: 2023-10-30

## 2023-10-30 ASSESSMENT — ENCOUNTER SYMPTOMS
DIARRHEA: 0
SHORTNESS OF BREATH: 0
NAUSEA: 0
ABDOMINAL PAIN: 0
CONSTIPATION: 0
SINUS PRESSURE: 1
SORE THROAT: 0
COUGH: 1
CHEST TIGHTNESS: 0
RHINORRHEA: 0
BLOOD IN STOOL: 0

## 2023-10-30 NOTE — PATIENT INSTRUCTIONS
Survey: You may be receiving a survey from [employer] regarding your visit today. You may get this in the mail, through your MyChart or in your email. Please complete the survey to enable us to provide the highest quality of care to you and your family. Please also, mention our names. If you cannot score us as very good (5 Stars) on any question, please feel free to call the office to discuss how we could have made your experience exceptional.      Thank You! MD Parag Barnard, [facility]3 [facility], [facility]5 [facility], APRN [facility]    Sophia Crockett, [address]       1. Acute bacterial sinusitis  Doxycyline 100 mg by mouth by mouth daily x 10 days. - doxycycline hyclate (VIBRA-TABS) 100 MG tablet; Take 1 tablet by mouth 2 times daily for 10 days  Dispense: 20 tablet; Refill: 0    2. Benign essential HTN  Controlled on Amlodipine and HCTZ. No change in medication. 3. Acquired hypothyroidism  Last TSH normal off medication. No need for medication at this time. 4. Hypercholesteremia  Controlled on Lipitor. No change in medication. Awaiting fasting labs from today. 5. Hypokalemia  Potassium low but has missed doses. Recommend taking on a regular basis. Constancia Babinski was instructed to follow up in the clinic in 6 months for check up or as needed with any medical issues.

## 2023-10-30 NOTE — PROGRESS NOTES
lipids. There are no compliance problems. Gastroesophageal Reflux  She complains of coughing. She reports no abdominal pain, no chest pain, no nausea or no sore throat. This is a chronic problem. The current episode started more than 1 year ago. The problem occurs rarely. The problem has been unchanged. She has tried a PPI for the symptoms. The treatment provided significant relief. Sinusitis  This is a new problem. Episode onset: 3 weeks. The problem has been waxing and waning since onset. Associated symptoms include congestion, coughing and sinus pressure. Pertinent negatives include no ear pain, neck pain, shortness of breath, sneezing or sore throat. Treatments tried: Augmentin. The treatment provided moderate relief. Review of Systems   Constitutional: Negative. HENT:  Positive for congestion and sinus pressure. Negative for ear pain, rhinorrhea, sneezing and sore throat. Eyes:  Negative for visual disturbance. Respiratory:  Positive for cough. Negative for chest tightness and shortness of breath. Cardiovascular:  Negative for chest pain and palpitations. Gastrointestinal:  Negative for abdominal pain, blood in stool, constipation, diarrhea and nausea. Genitourinary:  Negative for difficulty urinating, dysuria, frequency, menstrual problem and urgency. Musculoskeletal:  Negative for arthralgias, joint swelling, myalgias and neck pain. Skin: Negative. Neurological:  Negative for syncope. Psychiatric/Behavioral: Negative. Objective   Physical Exam  Constitutional:       Appearance: She is well-developed. HENT:      Head: Atraumatic. Nose: Congestion present. Eyes:      Conjunctiva/sclera: Conjunctivae normal.   Cardiovascular:      Rate and Rhythm: Normal rate and regular rhythm. Heart sounds: Normal heart sounds. Pulmonary:      Effort: Pulmonary effort is normal.      Breath sounds: Normal breath sounds. Abdominal:      Palpations: Abdomen is soft.

## 2023-11-07 DIAGNOSIS — K21.9 GASTROESOPHAGEAL REFLUX DISEASE WITHOUT ESOPHAGITIS: ICD-10-CM

## 2023-11-08 RX ORDER — OMEPRAZOLE 20 MG/1
20 CAPSULE, DELAYED RELEASE ORAL DAILY
Qty: 90 CAPSULE | Refills: 1 | Status: SHIPPED | OUTPATIENT
Start: 2023-11-08

## 2023-11-28 DIAGNOSIS — I10 BENIGN ESSENTIAL HTN: ICD-10-CM

## 2023-11-28 RX ORDER — AMLODIPINE BESYLATE 5 MG/1
5 TABLET ORAL DAILY
Qty: 90 TABLET | Refills: 1 | Status: SHIPPED | OUTPATIENT
Start: 2023-11-28

## 2024-01-17 DIAGNOSIS — I10 BENIGN ESSENTIAL HTN: ICD-10-CM

## 2024-01-17 RX ORDER — HYDROCHLOROTHIAZIDE 25 MG/1
25 TABLET ORAL DAILY
Qty: 90 TABLET | Refills: 1 | Status: SHIPPED | OUTPATIENT
Start: 2024-01-17

## 2024-02-05 ENCOUNTER — OFFICE VISIT (OUTPATIENT)
Dept: FAMILY MEDICINE CLINIC | Age: 64
End: 2024-02-05
Payer: MEDICARE

## 2024-02-05 ENCOUNTER — HOSPITAL ENCOUNTER (OUTPATIENT)
Age: 64
Discharge: HOME | End: 2024-02-05
Payer: MEDICARE

## 2024-02-05 VITALS
HEIGHT: 59 IN | HEART RATE: 94 BPM | WEIGHT: 143 LBS | BODY MASS INDEX: 28.83 KG/M2 | SYSTOLIC BLOOD PRESSURE: 139 MMHG | DIASTOLIC BLOOD PRESSURE: 82 MMHG

## 2024-02-05 DIAGNOSIS — Z86.010 HISTORY OF ADENOMATOUS POLYP OF COLON: ICD-10-CM

## 2024-02-05 DIAGNOSIS — Z12.11 COLON CANCER SCREENING: Primary | ICD-10-CM

## 2024-02-05 DIAGNOSIS — Z01.818 PRE-OP TESTING: ICD-10-CM

## 2024-02-05 PROCEDURE — 1123F ACP DISCUSS/DSCN MKR DOCD: CPT | Performed by: INTERNAL MEDICINE

## 2024-02-05 PROCEDURE — 99213 OFFICE O/P EST LOW 20 MIN: CPT | Performed by: INTERNAL MEDICINE

## 2024-02-05 PROCEDURE — 3075F SYST BP GE 130 - 139MM HG: CPT | Performed by: INTERNAL MEDICINE

## 2024-02-05 PROCEDURE — 93005 ELECTROCARDIOGRAM TRACING: CPT | Performed by: INTERNAL MEDICINE

## 2024-02-05 PROCEDURE — G8484 FLU IMMUNIZE NO ADMIN: HCPCS | Performed by: INTERNAL MEDICINE

## 2024-02-05 PROCEDURE — 1090F PRES/ABSN URINE INCON ASSESS: CPT | Performed by: INTERNAL MEDICINE

## 2024-02-05 PROCEDURE — G8419 CALC BMI OUT NRM PARAM NOF/U: HCPCS | Performed by: INTERNAL MEDICINE

## 2024-02-05 PROCEDURE — 3017F COLORECTAL CA SCREEN DOC REV: CPT | Performed by: INTERNAL MEDICINE

## 2024-02-05 PROCEDURE — 3079F DIAST BP 80-89 MM HG: CPT | Performed by: INTERNAL MEDICINE

## 2024-02-05 PROCEDURE — 4004F PT TOBACCO SCREEN RCVD TLK: CPT | Performed by: INTERNAL MEDICINE

## 2024-02-05 PROCEDURE — G8399 PT W/DXA RESULTS DOCUMENT: HCPCS | Performed by: INTERNAL MEDICINE

## 2024-02-05 PROCEDURE — G8427 DOCREV CUR MEDS BY ELIG CLIN: HCPCS | Performed by: INTERNAL MEDICINE

## 2024-02-05 RX ORDER — SODIUM, POTASSIUM,MAG SULFATES 17.5-3.13G
SOLUTION, RECONSTITUTED, ORAL ORAL
Qty: 1 EACH | Refills: 0 | Status: SHIPPED | OUTPATIENT
Start: 2024-02-05

## 2024-02-05 ASSESSMENT — PATIENT HEALTH QUESTIONNAIRE - PHQ9
7. TROUBLE CONCENTRATING ON THINGS, SUCH AS READING THE NEWSPAPER OR WATCHING TELEVISION: 0
4. FEELING TIRED OR HAVING LITTLE ENERGY: 0
1. LITTLE INTEREST OR PLEASURE IN DOING THINGS: 0
SUM OF ALL RESPONSES TO PHQ QUESTIONS 1-9: 0
9. THOUGHTS THAT YOU WOULD BE BETTER OFF DEAD, OR OF HURTING YOURSELF: 0
2. FEELING DOWN, DEPRESSED OR HOPELESS: 0
5. POOR APPETITE OR OVEREATING: 0
8. MOVING OR SPEAKING SO SLOWLY THAT OTHER PEOPLE COULD HAVE NOTICED. OR THE OPPOSITE, BEING SO FIGETY OR RESTLESS THAT YOU HAVE BEEN MOVING AROUND A LOT MORE THAN USUAL: 0
SUM OF ALL RESPONSES TO PHQ QUESTIONS 1-9: 0
6. FEELING BAD ABOUT YOURSELF - OR THAT YOU ARE A FAILURE OR HAVE LET YOURSELF OR YOUR FAMILY DOWN: 0
SUM OF ALL RESPONSES TO PHQ9 QUESTIONS 1 & 2: 0

## 2024-02-05 ASSESSMENT — ENCOUNTER SYMPTOMS
SORE THROAT: 0
SHORTNESS OF BREATH: 0
CHEST TIGHTNESS: 0
ABDOMINAL PAIN: 0
CONSTIPATION: 0
NAUSEA: 0
RHINORRHEA: 0
BLOOD IN STOOL: 0
DIARRHEA: 0
COUGH: 0

## 2024-02-05 NOTE — PROGRESS NOTES
[facility]   Preadmission Testing    Name: Isabel Michaels  : 1960  Patient Phone: [phone] (home)     Procedure: Colonoscopy  Date of Procedure: 2024    Surgeon: Adán Koroma MD    Ht:  149.9 cm (4' 11\")  Wt: 63.5 kg (140 lb)  Wt method:     Allergies:   Allergies   Allergen Reactions    Alendronate Sodium Nausea Only     Body aches    Ace Inhibitors Other (See Comments)     Causes a cough.                There were no vitals filed for this visit.    No LMP recorded. Patient is postmenopausal.    Do you take blood thinners?   [] Yes    [x] No         Instructed to stop blood thinners prior to procedure?    [] Yes    [] No      [x] N/A   Do you have sleep apnea?   [] Yes    [x] No     Do you have acid reflux ?   [x] Yes    [] No     Do you have  hiatal hernia?   [] Yes    [x] No    Do you ever experience motion sickness?   [] Yes    [x] No     Have you had a respiratory infection or sore throat in last 4 weeks before surgery?    [] Yes    [x] No     Do you have poorly controlled asthma or COPD?  Difficulty with intubation in past? [] Yes    [x] No      [] Yes    [x] No       Do you have a history of angina in the last month or symptomatic arrhythmia?   [] Yes    [x] No     Do you have significant central nervous system disease?   [] Yes    [x] No     Have you had an EKG, labs, or chest xray in last 12 months?  If yes provide copies to anesthesia   [] Yes    [x] No       [] Lab    [] EKG    [] CXR     Have you had a stress test?     [] Yes    [x] No    When/where:    Was it normal?    [] Yes    [] No     Do you or your family have a history of Malignant Hyperthermia?   [] Yes    [x] No           Do you smoke? [x] Yes    [] No      Please refrain from smoking on the day of surgery.      Patient instructed on: [x] NPO Status   [x] Meds to Take  [] Hold GLP-1 Receptor Agonist  [x] Ride Home  [x] No Jewelry/Contact Lenses/Nail Polish  [] Prep/Lax/Clear Liquids    [] Chlorhexidene     DOS

## 2024-02-05 NOTE — PROGRESS NOTES
[facility]  1960[facility] is a 63 y.o. female,Established patient, here for evaluation of the following chief complaint(s):  Colonoscopy (Consult. Colonoscopy scheduled for 24. Previous scope 2018, Dr Koroma, 2 adenomatous polyps. )         ASSESSMENT/PLAN:  1. Colon cancer screening  -     COLONOSCOPY W/ OR W/O BIOPSY; Future  2. History of adenomatous polyp of colon  -     COLONOSCOPY W/ OR W/O BIOPSY; Future    Plan:    1. Colon cancer screening  Set up for a screening colonoscopy.  Risk and benefits of the procedure explained including risk for infection, bleeding, perforation, and death.  Verbal and written informed consent obtained.  colonoscopy  The bowel prep was explained to Isabel and she was instructed to start the prep the day before the procedure (printed directions were given).  Avoid corn 1 week prior to the procedure as this can cause suctioning difficulties during the procedure.  Avoid eating or drinking at least 8 hours prior to the procedure.  Isabel was encouraged to call the clinic if she has any questions prior to the procedure.   - COLONOSCOPY W/ OR W/O BIOPSY; Future    2. History of adenomatous polyp of colon    - COLONOSCOPY W/ OR W/O BIOPSY; Future      Follow up after the colonoscopy.           Subjective   SUBJECTIVE/OBJECTIVE:  Isabel a patient of mine presents to be evaluated for a colonoscopy.  Isabel is 63 y.o. and has had a colonoscopy in the past.  There is  a history of colon polyps (2018 2 adenomatous colon polyps).  Currently Isabel denies rectal bleeding, denies bowel habit changes, and denies abdominal pain.  There is not a family history of colon cancer.      Past Medical History:  No date: Arthritis  No date: Diverticulitis  No date: GERD (gastroesophageal reflux disease)  No date: Hyperlipidemia  No date: Hypertension  No date: Hypothyroidism  No date: Irritable bowel syndrome    Past Surgical History:  No date:  SECTION  No date: CHOLECYSTECTOMY  :

## 2024-02-05 NOTE — PATIENT INSTRUCTIONS
1. Colon cancer screening  Set up for a screening colonoscopy.  Risk and benefits of the procedure explained including risk for infection, bleeding, perforation, and death.  Verbal and written informed consent obtained.  colonoscopy  The bowel prep was explained to Isabel and she was instructed to start the prep the day before the procedure (printed directions were given).  Avoid corn 1 week prior to the procedure as this can cause suctioning difficulties during the procedure.  Avoid eating or drinking at least 8 hours prior to the procedure.  Isabel was encouraged to call the clinic if she has any questions prior to the procedure.   - COLONOSCOPY W/ OR W/O BIOPSY; Future    2. History of adenomatous polyp of colon    - COLONOSCOPY W/ OR W/O BIOPSY; Future      Follow up after the colonoscopy.

## 2024-02-06 LAB
EKG ATRIAL RATE: 88 BPM
EKG P AXIS: 74 DEGREES
EKG P-R INTERVAL: 148 MS
EKG Q-T INTERVAL: 360 MS
EKG QRS DURATION: 80 MS
EKG QTC CALCULATION (BAZETT): 435 MS
EKG R AXIS: 67 DEGREES
EKG T AXIS: 74 DEGREES
EKG VENTRICULAR RATE: 88 BPM

## 2024-02-06 PROCEDURE — 93010 ELECTROCARDIOGRAM REPORT: CPT | Performed by: INTERNAL MEDICINE

## 2024-02-07 ENCOUNTER — ANESTHESIA EVENT (OUTPATIENT)
Dept: ENDOSCOPY | Age: 64
End: 2024-02-07
Payer: MEDICARE

## 2024-02-09 ENCOUNTER — PREP FOR PROCEDURE (OUTPATIENT)
Dept: FAMILY MEDICINE CLINIC | Age: 64
End: 2024-02-09

## 2024-02-09 RX ORDER — SODIUM CHLORIDE 0.9 % (FLUSH) 0.9 %
5-40 SYRINGE (ML) INJECTION PRN
Status: CANCELLED | OUTPATIENT
Start: 2024-02-09

## 2024-02-09 RX ORDER — SODIUM CHLORIDE 9 MG/ML
25 INJECTION, SOLUTION INTRAVENOUS PRN
Status: CANCELLED | OUTPATIENT
Start: 2024-02-09

## 2024-02-09 RX ORDER — SODIUM CHLORIDE, SODIUM LACTATE, POTASSIUM CHLORIDE, CALCIUM CHLORIDE 600; 310; 30; 20 MG/100ML; MG/100ML; MG/100ML; MG/100ML
INJECTION, SOLUTION INTRAVENOUS CONTINUOUS
Status: CANCELLED | OUTPATIENT
Start: 2024-02-09

## 2024-02-09 RX ORDER — SODIUM CHLORIDE 0.9 % (FLUSH) 0.9 %
5-40 SYRINGE (ML) INJECTION EVERY 12 HOURS SCHEDULED
Status: CANCELLED | OUTPATIENT
Start: 2024-02-09

## 2024-02-12 ENCOUNTER — HOSPITAL ENCOUNTER (OUTPATIENT)
Age: 64
Setting detail: OUTPATIENT SURGERY
Discharge: HOME | End: 2024-02-12
Attending: INTERNAL MEDICINE | Admitting: INTERNAL MEDICINE
Payer: MEDICARE

## 2024-02-12 ENCOUNTER — ANESTHESIA (OUTPATIENT)
Dept: ENDOSCOPY | Age: 64
End: 2024-02-12
Payer: MEDICARE

## 2024-02-12 VITALS
RESPIRATION RATE: 20 BRPM | SYSTOLIC BLOOD PRESSURE: 144 MMHG | TEMPERATURE: 96.7 F | HEIGHT: 59 IN | OXYGEN SATURATION: 97 % | BODY MASS INDEX: 27.84 KG/M2 | WEIGHT: 138.1 LBS | DIASTOLIC BLOOD PRESSURE: 78 MMHG | HEART RATE: 82 BPM

## 2024-02-12 DIAGNOSIS — Z01.818 PRE-OP TESTING: Primary | ICD-10-CM

## 2024-02-12 DIAGNOSIS — Z12.11 SCREEN FOR COLON CANCER: ICD-10-CM

## 2024-02-12 PROCEDURE — 2580000003 HC RX 258: Performed by: INTERNAL MEDICINE

## 2024-02-12 PROCEDURE — 2500000003 HC RX 250 WO HCPCS: Performed by: NURSE ANESTHETIST, CERTIFIED REGISTERED

## 2024-02-12 PROCEDURE — 7100000011 HC PHASE II RECOVERY - ADDTL 15 MIN: Performed by: INTERNAL MEDICINE

## 2024-02-12 PROCEDURE — 3700000000 HC ANESTHESIA ATTENDED CARE: Performed by: INTERNAL MEDICINE

## 2024-02-12 PROCEDURE — 3609010300 HC COLONOSCOPY W/BIOPSY SINGLE/MULTIPLE: Performed by: INTERNAL MEDICINE

## 2024-02-12 PROCEDURE — 3700000001 HC ADD 15 MINUTES (ANESTHESIA): Performed by: INTERNAL MEDICINE

## 2024-02-12 PROCEDURE — 2709999900 HC NON-CHARGEABLE SUPPLY: Performed by: INTERNAL MEDICINE

## 2024-02-12 PROCEDURE — 6360000002 HC RX W HCPCS: Performed by: NURSE ANESTHETIST, CERTIFIED REGISTERED

## 2024-02-12 PROCEDURE — 88305 TISSUE EXAM BY PATHOLOGIST: CPT

## 2024-02-12 PROCEDURE — 7100000010 HC PHASE II RECOVERY - FIRST 15 MIN: Performed by: INTERNAL MEDICINE

## 2024-02-12 RX ORDER — SODIUM CHLORIDE 9 MG/ML
25 INJECTION, SOLUTION INTRAVENOUS PRN
Status: DISCONTINUED | OUTPATIENT
Start: 2024-02-12 | End: 2024-02-12 | Stop reason: HOSPADM

## 2024-02-12 RX ORDER — LIDOCAINE HYDROCHLORIDE 10 MG/ML
INJECTION, SOLUTION EPIDURAL; INFILTRATION; INTRACAUDAL; PERINEURAL PRN
Status: DISCONTINUED | OUTPATIENT
Start: 2024-02-12 | End: 2024-02-12 | Stop reason: SDUPTHER

## 2024-02-12 RX ORDER — PROPOFOL 10 MG/ML
INJECTION, EMULSION INTRAVENOUS CONTINUOUS PRN
Status: DISCONTINUED | OUTPATIENT
Start: 2024-02-12 | End: 2024-02-12 | Stop reason: SDUPTHER

## 2024-02-12 RX ORDER — SODIUM CHLORIDE 0.9 % (FLUSH) 0.9 %
5-40 SYRINGE (ML) INJECTION PRN
Status: DISCONTINUED | OUTPATIENT
Start: 2024-02-12 | End: 2024-02-12 | Stop reason: HOSPADM

## 2024-02-12 RX ORDER — MIDAZOLAM HYDROCHLORIDE 1 MG/ML
INJECTION INTRAMUSCULAR; INTRAVENOUS PRN
Status: DISCONTINUED | OUTPATIENT
Start: 2024-02-12 | End: 2024-02-12 | Stop reason: SDUPTHER

## 2024-02-12 RX ORDER — SODIUM CHLORIDE 0.9 % (FLUSH) 0.9 %
5-40 SYRINGE (ML) INJECTION EVERY 12 HOURS SCHEDULED
Status: DISCONTINUED | OUTPATIENT
Start: 2024-02-12 | End: 2024-02-12 | Stop reason: HOSPADM

## 2024-02-12 RX ORDER — PROPOFOL 10 MG/ML
INJECTION, EMULSION INTRAVENOUS PRN
Status: DISCONTINUED | OUTPATIENT
Start: 2024-02-12 | End: 2024-02-12 | Stop reason: SDUPTHER

## 2024-02-12 RX ORDER — FENTANYL CITRATE 50 UG/ML
INJECTION, SOLUTION INTRAMUSCULAR; INTRAVENOUS PRN
Status: DISCONTINUED | OUTPATIENT
Start: 2024-02-12 | End: 2024-02-12 | Stop reason: SDUPTHER

## 2024-02-12 RX ORDER — MAGNESIUM HYDROXIDE 1200 MG/15ML
LIQUID ORAL PRN
Status: DISCONTINUED | OUTPATIENT
Start: 2024-02-12 | End: 2024-02-12 | Stop reason: ALTCHOICE

## 2024-02-12 RX ORDER — GLYCOPYRROLATE 0.2 MG/ML
INJECTION INTRAMUSCULAR; INTRAVENOUS PRN
Status: DISCONTINUED | OUTPATIENT
Start: 2024-02-12 | End: 2024-02-12 | Stop reason: SDUPTHER

## 2024-02-12 RX ORDER — SODIUM CHLORIDE, SODIUM LACTATE, POTASSIUM CHLORIDE, CALCIUM CHLORIDE 600; 310; 30; 20 MG/100ML; MG/100ML; MG/100ML; MG/100ML
INJECTION, SOLUTION INTRAVENOUS CONTINUOUS
Status: DISCONTINUED | OUTPATIENT
Start: 2024-02-12 | End: 2024-02-12 | Stop reason: HOSPADM

## 2024-02-12 RX ADMIN — PROPOFOL 80 MG: 10 INJECTION, EMULSION INTRAVENOUS at 07:20

## 2024-02-12 RX ADMIN — LIDOCAINE HYDROCHLORIDE 50 MG: 10 INJECTION, SOLUTION EPIDURAL; INFILTRATION; INTRACAUDAL; PERINEURAL at 07:20

## 2024-02-12 RX ADMIN — PROPOFOL 100 MCG/KG/MIN: 10 INJECTION, EMULSION INTRAVENOUS at 07:20

## 2024-02-12 RX ADMIN — FENTANYL CITRATE 50 MCG: 50 INJECTION, SOLUTION INTRAMUSCULAR; INTRAVENOUS at 07:20

## 2024-02-12 RX ADMIN — MIDAZOLAM 2 MG: 1 INJECTION INTRAMUSCULAR; INTRAVENOUS at 07:17

## 2024-02-12 RX ADMIN — PROPOFOL 20 MG: 10 INJECTION, EMULSION INTRAVENOUS at 07:29

## 2024-02-12 RX ADMIN — SODIUM CHLORIDE, POTASSIUM CHLORIDE, SODIUM LACTATE AND CALCIUM CHLORIDE: 600; 310; 30; 20 INJECTION, SOLUTION INTRAVENOUS at 06:33

## 2024-02-12 RX ADMIN — GLYCOPYRROLATE 0.4 MG: 0.2 INJECTION INTRAMUSCULAR; INTRAVENOUS at 07:26

## 2024-02-12 RX ADMIN — FENTANYL CITRATE 50 MCG: 50 INJECTION, SOLUTION INTRAMUSCULAR; INTRAVENOUS at 07:17

## 2024-02-12 NOTE — PROGRESS NOTES
Discharge Criteria  Outpatients must meet criteria 1 through 7.    Up to restroom, void sufficient amount.     Yes  Gait steady when up  1.  Minimum 30 minutes after last dose of sedative medication, minimum 120 minutes after last dose of reversal agent.    Yes    2.  Systolic BP stable within 20 mmHg for 30 minutes & systolic BP between 90 & 180 or within 10 mmHg of baseline.    Yes    3.  Pulse between 60 and 100 or within 10 bpm of baseline.    Yes    4.  Spontaneous respiratory rate >/= 10 per minute.    Yes    5.  SaO2 >/= 95 or  >/= baseline.    Yes    6.  Able to cough and swallow or return to baseline function.    Yes    7.  Alert and oriented or return to baseline mental status.    Yes    8.  Demonstrates controlled, coordinated movements, ambulates with steady gait, or return to baseline activity function.    Yes    9.  Minimal or no pain or nausea, or at a level tolerable and acceptable to patient.    Yes    10. Takes and retains oral fluids as allowed.    Yes    11. Procedural / perioperative site stable.  Minimal or no bleeding.    Yes        12. If GI endoscopy procedure, minimal or no abdominal distention or passing flatus.    Yes    13. Written discharge instructions and emergency telephone number provided.    Yes    14. Accompanied by a responsible adult.    Yes    Adult patient discharged from facility without responsible person meets above criteria plus the following:   a) remains awake without stimulus for 30 minutes     b) oriented appropriate for age      c) all vital signs stable   d) no significant risk of losing protective reflexes      e) able to maintain pre-procedure mobility without assistance   f) no nausea or dizziness      g) transportation arrangements that do not require patient to operate motor   Vehicle.  Yes

## 2024-02-12 NOTE — DISCHARGE INSTRUCTIONS
Discharge Instructions    Admission Date:  2/12/2024  Discharge Date:  2/12/24    Disposition:  Home    Activity:  As tolerated    Diet:  Low Cholesterol       Discharge Instructions:  Resume previous home medication.    Follow Up:  With your primary care physician (Dr. Koroma) in 2 weeks.    Discharge Instructions for Colonoscopy    Do not drive or operate machinery for 24 hours.  Do not make important personal or business decisions for 24 hours.   Do not drink alcoholic beverages for 24 hours.  Do not smoke tobacco products for 24 hours.  It is normal to have a feeling of fullness or mild cramping in your abdomen afterwards due to air that is put into your bowel during the procedure. Mild activities such as walking will help you pass the air.  You may resume your regular diet.   Results from a biopsy may take up to 2 weeks to return from pathology.  Make a follow-up appointment with PCP to be seen in 2 weeks.     SEEK MEDICAL ATTENTION IF:    Chest Pain or trouble breathing.    Persistent and significant bleeding from your rectum, such as soaking through clothing and/or feeling dizzy and sweating.    A fever above 101F or if you have chills.    If symptoms are severe call 911 or go to the nearest Emergency Room.

## 2024-02-12 NOTE — ANESTHESIA PRE PROCEDURE
Department of Anesthesiology  Preprocedure Note       Name:  Isabel Michaels   Age:  63 y.o.  :  1960                                          MRN:  [id number]         Date:  2024      Surgeon: Surgeon(s):  Adán Koroma MD    Procedure: Procedure(s):  COLONOSCOPY    Medications prior to admission:   Prior to Admission medications    Medication Sig Start Date End Date Taking? Authorizing Provider   hydroCHLOROthiazide (HYDRODIURIL) 25 MG tablet TAKE 1 TABLET BY MOUTH DAILY 24   Adrienne Pascual APRN - CNP   sertraline (ZOLOFT) 50 MG tablet TAKE 1 TABLET BY MOUTH DAILY 23   Tod Tenorio MD   amLODIPine (NORVASC) 5 MG tablet TAKE 1 TABLET BY MOUTH DAILY 23   Adán Koroma MD   omeprazole (PRILOSEC) 20 MG delayed release capsule TAKE 1 CAPSULE BY MOUTH DAILY 23   Tod Tenorio MD   atorvastatin (LIPITOR) 40 MG tablet TAKE 1 TABLET BY MOUTH DAILY 10/23/23   Adán Koroma MD   potassium chloride (KLOR-CON M20) 20 MEQ extended release tablet Take 1 tablet by mouth daily 23   Adán Koroma MD   fluticasone (FLONASE) 50 MCG/ACT nasal spray SPRAY TWO SPRAYS IN EACH NOSTRIL ONCE DAILY 3/17/22   Adán Koroma MD   cetirizine (ZYRTEC) 10 MG tablet Take 1 tablet by mouth daily    ProviderKayden MD       Current medications:    Current Facility-Administered Medications   Medication Dose Route Frequency Provider Last Rate Last Admin    lactated ringers IV soln infusion   IntraVENous Continuous Adán Koroma MD 75 mL/hr at 24 0633 New Bag at 24 0633    sodium chloride flush 0.9 % injection 5-40 mL  5-40 mL IntraVENous 2 times per day Adán Koroma MD        sodium chloride flush 0.9 % injection 5-40 mL  5-40 mL IntraVENous PRN Adán Koroma MD        0.9 % sodium chloride infusion  25 mL IntraVENous PRN Adán Koroma MD           Allergies:    Allergies   Allergen Reactions    Alendronate Sodium Nausea Only     Body aches    Ace Inhibitors Other (See Comments)     Causes a

## 2024-02-12 NOTE — BRIEF OP NOTE
Brief Postoperative Note      Patient: Isabel Michaels  YOB: 1960  MRN: [id number]    Date of Procedure: 2/12/2024    Pre-Op Diagnosis Codes:     * Screen for colon cancer [Z12.11]    Post-Op Diagnosis:  Colon cancer screening.     Transverse colon polyp.     Descending colon polyp.     Mild descending colon diverticulosis.     Moderate sigmoid colon diverticulosis.     Irregular mucosa in the rectum.         Procedure(s):  Colonoscopy to the Cecum.  Transverse colon polypectomy with cold cup biopsy forceps.  Descending colon polypectomy with cold cup biopsy forceps.  Biopsy of the rectum.    Surgeon(s):  Adán Koroma MD    Assistant:  Nirmala Shea CST    Anesthesia: Rosalino Gracia CRNA.  MAC anesthesia.     Estimated Blood Loss (mL): < 1 ml    Complications: None    Specimens:   ID Type Source Tests Collected by Time Destination   A : A. Transverse colon polyp Tissue Colon-Transverse SURGICAL PATHOLOGY Adán Koroma MD 2/12/2024 0737    B : B. Descending colon polyp Tissue Colon-Descending SURGICAL PATHOLOGY Adán Koroma MD 2/12/2024 0740    C : C. Irregular mucosa rectum Tissue Rectum SURGICAL PATHOLOGY Adán Koroma MD 2/12/2024 0748        Implants:  * No implants in log *      Drains: * No LDAs found *        Electronically signed by Adán Koroma MD on 2/12/2024 at 7:51 AM

## 2024-02-12 NOTE — H&P
History and Physical       Isabel Michaels (:  1960) is a 63 y.o. female,Established patient, here for evaluation of the following chief complaint(s):  Colonoscopy (Consult. Colonoscopy scheduled for 24. Previous scope 2018, Dr Koroma, 2 adenomatous polyps. )        ASSESSMENT/PLAN:  1. Colon cancer screening  -     COLONOSCOPY W/ OR W/O BIOPSY; Future  2. History of adenomatous polyp of colon  -     COLONOSCOPY W/ OR W/O BIOPSY; Future     Plan:     1. Colon cancer screening  Set up for a screening colonoscopy.  Risk and benefits of the procedure explained including risk for infection, bleeding, perforation, and death.  Verbal and written informed consent obtained.  colonoscopy  The bowel prep was explained to Isabel and she was instructed to start the prep the day before the procedure (printed directions were given).  Avoid corn 1 week prior to the procedure as this can cause suctioning difficulties during the procedure.  Avoid eating or drinking at least 8 hours prior to the procedure.  Isabel was encouraged to call the clinic if she has any questions prior to the procedure.   - COLONOSCOPY W/ OR W/O BIOPSY; Future     2. History of adenomatous polyp of colon     - COLONOSCOPY W/ OR W/O BIOPSY; Future        Follow up after the colonoscopy.          Subjective   SUBJECTIVE/OBJECTIVE:  Isabel a patient of mine presents to be evaluated for a colonoscopy.  Isabel is 63 y.o. and has had a colonoscopy in the past.  There is  a history of colon polyps (2018 2 adenomatous colon polyps).  Currently Isabel denies rectal bleeding, denies bowel habit changes, and denies abdominal pain.  There is not a family history of colon cancer.     No current facility-administered medications on file prior to encounter.     Current Outpatient Medications on File Prior to Encounter   Medication Sig Dispense Refill    atorvastatin (LIPITOR) 40 MG tablet TAKE 1 TABLET BY MOUTH DAILY 90 tablet 1    potassium chloride (KLOR-CON M20)

## 2024-02-12 NOTE — ANESTHESIA POSTPROCEDURE EVALUATION
Department of Anesthesiology  Postprocedure Note    Patient: Isabel Michaels  MRN: [id number]  YOB: 1960  Date of evaluation: 2/12/2024    Procedure Summary       Date: 02/12/24 Room / Location: [facility] ENDO 01A / [facility] ENDOSCOPY    Anesthesia Start: 0717 Anesthesia Stop: 0750    Procedure: COLONOSCOPY (Abdomen) Diagnosis:       Screen for colon cancer      (Screen for colon cancer [Z12.11])    Surgeons: Adán Koroma MD Responsible Provider: Rosalino Gracia APRN - CRNA    Anesthesia Type: MAC ASA Status: 3            Anesthesia Type: No value filed.    Sterling Phase I: Sterling Score: 10    Sterling Phase II:      Anesthesia Post Evaluation    Patient location during evaluation: PACU  Patient participation: complete - patient participated  Level of consciousness: awake and lethargic  Pain score: 0  Airway patency: patent  Nausea & Vomiting: no nausea and no vomiting  Cardiovascular status: blood pressure returned to baseline  Respiratory status: acceptable, room air and spontaneous ventilation  Hydration status: euvolemic  Multimodal analgesia pain management approach  Pain management: adequate and satisfactory to patient    No notable events documented.

## 2024-02-15 LAB — SURGICAL PATHOLOGY REPORT: NORMAL

## 2024-02-29 ENCOUNTER — OFFICE VISIT (OUTPATIENT)
Dept: FAMILY MEDICINE CLINIC | Age: 64
End: 2024-02-29

## 2024-02-29 VITALS
HEART RATE: 107 BPM | DIASTOLIC BLOOD PRESSURE: 66 MMHG | SYSTOLIC BLOOD PRESSURE: 120 MMHG | WEIGHT: 141 LBS | HEIGHT: 59 IN | BODY MASS INDEX: 28.43 KG/M2

## 2024-02-29 DIAGNOSIS — J10.1 INFLUENZA A: Primary | ICD-10-CM

## 2024-02-29 DIAGNOSIS — R05.1 ACUTE COUGH: ICD-10-CM

## 2024-02-29 RX ORDER — OSELTAMIVIR PHOSPHATE 75 MG/1
75 CAPSULE ORAL 2 TIMES DAILY
Qty: 10 CAPSULE | Refills: 0 | Status: SHIPPED | OUTPATIENT
Start: 2024-02-29 | End: 2024-03-05

## 2024-02-29 ASSESSMENT — ENCOUNTER SYMPTOMS
EYES NEGATIVE: 1
COUGH: 1
SORE THROAT: 1
ABDOMINAL PAIN: 1
WHEEZING: 1
NAUSEA: 1
DIARRHEA: 0
SHORTNESS OF BREATH: 1

## 2024-02-29 NOTE — PATIENT INSTRUCTIONS
Increase fluids  Use Tylenol/Ibuprofen for pain or fever  Over the counter cough/cold medications may be used  1 tsp of honey in warm water will soothe throat and help with cough  Nasal saline nasal spray can be used to keep nasal passages moist and clear

## 2024-02-29 NOTE — PROGRESS NOTES
Isabel TIERNEY [facility] (:  1960) is a 63 y.o. female,Established patient, here for evaluation of the following chief complaint(s):  Congestion (Body aches, dry cough, ear pressure, and headaches. Started Tuesday. )         ASSESSMENT/PLAN:  1. Influenza A  -     oseltamivir (TAMIFLU) 75 MG capsule; Take 1 capsule by mouth 2 times daily for 5 days Influenza A, Disp-10 capsule, R-0Normal  2. Acute cough  -     POCT Influenza A/B  -     POC COVID-19    Plan:     1. Influenza A  POCT Influenza A positive today  Will treat with Tamiflu for 5 days  Increase fluids  Use Tylenol/Ibuprofen for pain or fever  Over the counter cough/cold medications may be used  1 tsp of honey in warm water will soothe throat and help with cough  Nasal saline nasal spray can be used to keep nasal passages moist and clear   Continue to use nasal lavage with distilled water  - oseltamivir (TAMIFLU) 75 MG capsule; Take 1 capsule by mouth 2 times daily for 5 days Influenza A  Dispense: 10 capsule; Refill: 0    2. Acute cough    - POCT Influenza A/B  - POC COVID-19      Return if symptoms worsen or fail to improve.         Subjective   SUBJECTIVE/OBJECTIVE:  Isabel presents today with complaints of cough, congestion, body aches for 1 1/2 days. She states her  was sick 4 days ago with PNU. She is unsure is she has had a fever but she reports chills. Cough is non productive. She also has sore throat and ear pain. She has been using a nasal lavage with distilled water and Flonase. She has used OTC cold medications and Tylenol with moderate relief.        Review of Systems   Constitutional:  Positive for activity change, appetite change, chills and fatigue.   HENT:  Positive for congestion, ear pain and sore throat.    Eyes: Negative.    Respiratory:  Positive for cough, shortness of breath and wheezing.    Cardiovascular:  Negative for chest pain and palpitations.   Gastrointestinal:  Positive for abdominal pain and nausea. Negative for

## 2024-03-25 ENCOUNTER — OFFICE VISIT (OUTPATIENT)
Dept: FAMILY MEDICINE CLINIC | Age: 64
End: 2024-03-25
Payer: MEDICARE

## 2024-03-25 VITALS
BODY MASS INDEX: 28.63 KG/M2 | WEIGHT: 142 LBS | HEART RATE: 96 BPM | SYSTOLIC BLOOD PRESSURE: 139 MMHG | HEIGHT: 59 IN | DIASTOLIC BLOOD PRESSURE: 78 MMHG

## 2024-03-25 DIAGNOSIS — J20.9 BRONCHITIS WITH BRONCHOSPASM: Primary | ICD-10-CM

## 2024-03-25 PROCEDURE — G8427 DOCREV CUR MEDS BY ELIG CLIN: HCPCS | Performed by: INTERNAL MEDICINE

## 2024-03-25 PROCEDURE — 3075F SYST BP GE 130 - 139MM HG: CPT | Performed by: INTERNAL MEDICINE

## 2024-03-25 PROCEDURE — G8399 PT W/DXA RESULTS DOCUMENT: HCPCS | Performed by: INTERNAL MEDICINE

## 2024-03-25 PROCEDURE — 99213 OFFICE O/P EST LOW 20 MIN: CPT | Performed by: INTERNAL MEDICINE

## 2024-03-25 PROCEDURE — 1123F ACP DISCUSS/DSCN MKR DOCD: CPT | Performed by: INTERNAL MEDICINE

## 2024-03-25 PROCEDURE — G8419 CALC BMI OUT NRM PARAM NOF/U: HCPCS | Performed by: INTERNAL MEDICINE

## 2024-03-25 PROCEDURE — 4004F PT TOBACCO SCREEN RCVD TLK: CPT | Performed by: INTERNAL MEDICINE

## 2024-03-25 PROCEDURE — 1090F PRES/ABSN URINE INCON ASSESS: CPT | Performed by: INTERNAL MEDICINE

## 2024-03-25 PROCEDURE — 3017F COLORECTAL CA SCREEN DOC REV: CPT | Performed by: INTERNAL MEDICINE

## 2024-03-25 PROCEDURE — G8484 FLU IMMUNIZE NO ADMIN: HCPCS | Performed by: INTERNAL MEDICINE

## 2024-03-25 PROCEDURE — 3078F DIAST BP <80 MM HG: CPT | Performed by: INTERNAL MEDICINE

## 2024-03-25 RX ORDER — PREDNISONE 20 MG/1
TABLET ORAL
Qty: 12 TABLET | Refills: 0 | Status: SHIPPED | OUTPATIENT
Start: 2024-03-25 | End: 2024-04-04

## 2024-03-25 RX ORDER — AZITHROMYCIN 250 MG/1
TABLET, FILM COATED ORAL
Qty: 1 PACKET | Refills: 0 | Status: SHIPPED | OUTPATIENT
Start: 2024-03-25 | End: 2024-04-04

## 2024-03-25 ASSESSMENT — ENCOUNTER SYMPTOMS
RHINORRHEA: 1
COUGH: 1
CHEST TIGHTNESS: 0
ABDOMINAL PAIN: 0
DIARRHEA: 0
BLOOD IN STOOL: 0
SINUS PAIN: 1
NAUSEA: 0
CONSTIPATION: 0
SORE THROAT: 1
SHORTNESS OF BREATH: 0

## 2024-03-25 NOTE — PATIENT INSTRUCTIONS
Survey:     You may be receiving a survey from [facility] regarding your visit today.     You may get this in the mail, through your MyChart or in your email.      Please complete the survey to enable us to provide the highest quality of care to you and your family. Please also, mention our names.     If you cannot score us as very good (5 Stars) on any question, please feel free to call the office to discuss how we could have made your experience exceptional.      Thank You!        Dr. Koroma, MD Gordon, RUBINA Leyva, AIDEE Medina CNP, AYLAA Arreaga MA       Plan:  1. Bronchitis with bronchospasm  Take the prednisone taper as directed on the prescription.  The prednisone is very bitter so swallow the tablets quickly to prevent  dissolving in the mouth.  After taking, don't lay  down for 2 hours to help prevent reflux.  Take the Zithromax (Z Gonzalez) as prescribed.  Return to the clinic if the symptoms continue or worsens.    - azithromycin (ZITHROMAX Z-GONZALEZ) 250 MG tablet; Two tablets by mouth the first day  then one tablet daily for 4 days.  Dispense: 1 packet; Refill: 0  - predniSONE (DELTASONE) 20 MG tablet; 3 tabs daily for 2 days then 2 tabs daily for 2 days then 1 tab daily for 2 days.  Dispense: 12 tablet; Refill: 0

## 2024-03-25 NOTE — PROGRESS NOTES
Isabel Michaels (:  1960) is a 64 y.o. female,Established patient, here for evaluation of the following chief complaint(s):  URI (Sx's persist with cough, ear pain, sore throat. 24 positive for flu, treated on tamiflu. )         ASSESSMENT/PLAN:  1. Bronchitis with bronchospasm  -     azithromycin (ZITHROMAX Z-PERAL) 250 MG tablet; Two tablets by mouth the first day  then one tablet daily for 4 days., Disp-1 packet, R-0Normal  -     predniSONE (DELTASONE) 20 MG tablet; 3 tabs daily for 2 days then 2 tabs daily for 2 days then 1 tab daily for 2 days., Disp-12 tablet, R-0Normal      Plan:  1. Bronchitis with bronchospasm  Take the prednisone taper as directed on the prescription.  The prednisone is very bitter so swallow the tablets quickly to prevent  dissolving in the mouth.  After taking, don't lay  down for 2 hours to help prevent reflux.  Take the Zithromax (Z Perla) as prescribed.  Return to the clinic if the symptoms continue or worsens.    - azithromycin (ZITHROMAX Z-PERLA) 250 MG tablet; Two tablets by mouth the first day  then one tablet daily for 4 days.  Dispense: 1 packet; Refill: 0  - predniSONE (DELTASONE) 20 MG tablet; 3 tabs daily for 2 days then 2 tabs daily for 2 days then 1 tab daily for 2 days.  Dispense: 12 tablet; Refill: 0                 Subjective   SUBJECTIVE/OBJECTIVE:  URI   This is a new problem. Episode onset: 3 weeks since being diagnosed with Influenza B. The problem has been waxing and waning. There has been no fever. Associated symptoms include coughing, ear pain, rhinorrhea, sinus pain and a sore throat. Pertinent negatives include no abdominal pain, chest pain, congestion, diarrhea, dysuria, nausea, neck pain or sneezing. She has tried nothing for the symptoms.       Review of Systems   Constitutional: Negative.    HENT:  Positive for ear pain, rhinorrhea, sinus pain and sore throat. Negative for congestion and sneezing.    Eyes:  Negative for visual disturbance.

## 2024-04-22 RX ORDER — ATORVASTATIN CALCIUM 40 MG/1
40 TABLET, FILM COATED ORAL DAILY
Qty: 90 TABLET | Refills: 1 | Status: SHIPPED | OUTPATIENT
Start: 2024-04-22

## 2024-04-24 ENCOUNTER — HOSPITAL ENCOUNTER (OUTPATIENT)
Age: 64
Discharge: HOME | End: 2024-04-24
Payer: MEDICARE

## 2024-04-24 DIAGNOSIS — E78.00 HYPERCHOLESTEREMIA: ICD-10-CM

## 2024-04-24 DIAGNOSIS — I10 BENIGN ESSENTIAL HTN: ICD-10-CM

## 2024-04-24 LAB
ALBUMIN SERPL-MCNC: 4.2 G/DL (ref 3.5–5.2)
ALP SERPL-CCNC: 96 U/L (ref 35–104)
ALT SERPL-CCNC: 15 U/L (ref 5–33)
ANION GAP SERPL CALCULATED.3IONS-SCNC: 19 MMOL/L (ref 9–17)
AST SERPL-CCNC: 17 U/L
BILIRUB SERPL-MCNC: 0.8 MG/DL (ref 0.3–1.2)
BUN SERPL-MCNC: 13 MG/DL (ref 8–23)
BUN/CREAT SERPL: 19 (ref 9–20)
CALCIUM SERPL-MCNC: 9.4 MG/DL (ref 8.6–10.4)
CHLORIDE SERPL-SCNC: 95 MMOL/L (ref 98–107)
CHOLEST SERPL-MCNC: 192 MG/DL (ref 0–199)
CHOLESTEROL/HDL RATIO: 3
CO2 SERPL-SCNC: 24 MMOL/L (ref 20–31)
CREAT SERPL-MCNC: 0.7 MG/DL (ref 0.5–0.9)
GFR SERPL CREATININE-BSD FRML MDRD: >90 ML/MIN/1.73M2
GLUCOSE SERPL-MCNC: 107 MG/DL (ref 70–99)
HDLC SERPL-MCNC: 70 MG/DL
LDLC SERPL CALC-MCNC: 84 MG/DL (ref 0–100)
POTASSIUM SERPL-SCNC: 3.4 MMOL/L (ref 3.7–5.3)
PROT SERPL-MCNC: 7.6 G/DL (ref 6.4–8.3)
SODIUM SERPL-SCNC: 138 MMOL/L (ref 135–144)
TRIGL SERPL-MCNC: 191 MG/DL
VLDLC SERPL CALC-MCNC: 38 MG/DL

## 2024-04-24 PROCEDURE — 80061 LIPID PANEL: CPT

## 2024-04-24 PROCEDURE — 80053 COMPREHEN METABOLIC PANEL: CPT

## 2024-04-24 PROCEDURE — 36415 COLL VENOUS BLD VENIPUNCTURE: CPT

## 2024-04-27 ASSESSMENT — LIFESTYLE VARIABLES
HOW OFTEN DO YOU HAVE A DRINK CONTAINING ALCOHOL: 4
HOW OFTEN DURING THE LAST YEAR HAVE YOU HAD A FEELING OF GUILT OR REMORSE AFTER DRINKING: NEVER
HOW OFTEN DURING THE LAST YEAR HAVE YOU HAD A FEELING OF GUILT OR REMORSE AFTER DRINKING: NEVER
HAS A RELATIVE, FRIEND, DOCTOR, OR ANOTHER HEALTH PROFESSIONAL EXPRESSED CONCERN ABOUT YOUR DRINKING OR SUGGESTED YOU CUT DOWN: NO
HOW OFTEN DURING THE LAST YEAR HAVE YOU FOUND THAT YOU WERE NOT ABLE TO STOP DRINKING ONCE YOU HAD STARTED: NEVER
HOW OFTEN DURING THE LAST YEAR HAVE YOU BEEN UNABLE TO REMEMBER WHAT HAPPENED THE NIGHT BEFORE BECAUSE YOU HAD BEEN DRINKING: NEVER
HOW OFTEN DO YOU HAVE A DRINK CONTAINING ALCOHOL: 2-3 TIMES A WEEK
HOW OFTEN DURING THE LAST YEAR HAVE YOU NEEDED AN ALCOHOLIC DRINK FIRST THING IN THE MORNING TO GET YOURSELF GOING AFTER A NIGHT OF HEAVY DRINKING: NEVER
HAVE YOU OR SOMEONE ELSE BEEN INJURED AS A RESULT OF YOUR DRINKING: NO
HOW OFTEN DURING THE LAST YEAR HAVE YOU FAILED TO DO WHAT WAS NORMALLY EXPECTED FROM YOU BECAUSE OF DRINKING: NEVER
HOW OFTEN DURING THE LAST YEAR HAVE YOU FAILED TO DO WHAT WAS NORMALLY EXPECTED FROM YOU BECAUSE OF DRINKING: NEVER
HOW OFTEN DO YOU HAVE SIX OR MORE DRINKS ON ONE OCCASION: 4
HAVE YOU OR SOMEONE ELSE BEEN INJURED AS A RESULT OF YOUR DRINKING: NO
HAS A RELATIVE, FRIEND, DOCTOR, OR ANOTHER HEALTH PROFESSIONAL EXPRESSED CONCERN ABOUT YOUR DRINKING OR SUGGESTED YOU CUT DOWN: NO
HOW OFTEN DURING THE LAST YEAR HAVE YOU BEEN UNABLE TO REMEMBER WHAT HAPPENED THE NIGHT BEFORE BECAUSE YOU HAD BEEN DRINKING: NEVER
HOW MANY STANDARD DRINKS CONTAINING ALCOHOL DO YOU HAVE ON A TYPICAL DAY: PATIENT DECLINED
HOW MANY STANDARD DRINKS CONTAINING ALCOHOL DO YOU HAVE ON A TYPICAL DAY: 98
HOW OFTEN DURING THE LAST YEAR HAVE YOU NEEDED AN ALCOHOLIC DRINK FIRST THING IN THE MORNING TO GET YOURSELF GOING AFTER A NIGHT OF HEAVY DRINKING: NEVER
HOW OFTEN DURING THE LAST YEAR HAVE YOU FOUND THAT YOU WERE NOT ABLE TO STOP DRINKING ONCE YOU HAD STARTED: NEVER

## 2024-04-27 ASSESSMENT — PATIENT HEALTH QUESTIONNAIRE - PHQ9
6. FEELING BAD ABOUT YOURSELF - OR THAT YOU ARE A FAILURE OR HAVE LET YOURSELF OR YOUR FAMILY DOWN: NOT AT ALL
4. FEELING TIRED OR HAVING LITTLE ENERGY: NOT AT ALL
SUM OF ALL RESPONSES TO PHQ QUESTIONS 1-9: 0
SUM OF ALL RESPONSES TO PHQ9 QUESTIONS 1 & 2: 0
SUM OF ALL RESPONSES TO PHQ QUESTIONS 1-9: 0
10. IF YOU CHECKED OFF ANY PROBLEMS, HOW DIFFICULT HAVE THESE PROBLEMS MADE IT FOR YOU TO DO YOUR WORK, TAKE CARE OF THINGS AT HOME, OR GET ALONG WITH OTHER PEOPLE: NOT DIFFICULT AT ALL
SUM OF ALL RESPONSES TO PHQ QUESTIONS 1-9: 0
7. TROUBLE CONCENTRATING ON THINGS, SUCH AS READING THE NEWSPAPER OR WATCHING TELEVISION: NOT AT ALL
9. THOUGHTS THAT YOU WOULD BE BETTER OFF DEAD, OR OF HURTING YOURSELF: NOT AT ALL
1. LITTLE INTEREST OR PLEASURE IN DOING THINGS: NOT AT ALL
5. POOR APPETITE OR OVEREATING: NOT AT ALL
8. MOVING OR SPEAKING SO SLOWLY THAT OTHER PEOPLE COULD HAVE NOTICED. OR THE OPPOSITE, BEING SO FIGETY OR RESTLESS THAT YOU HAVE BEEN MOVING AROUND A LOT MORE THAN USUAL: NOT AT ALL
3. TROUBLE FALLING OR STAYING ASLEEP: NOT AT ALL
2. FEELING DOWN, DEPRESSED OR HOPELESS: NOT AT ALL
SUM OF ALL RESPONSES TO PHQ QUESTIONS 1-9: 0

## 2024-04-29 ENCOUNTER — OFFICE VISIT (OUTPATIENT)
Dept: FAMILY MEDICINE CLINIC | Age: 64
End: 2024-04-29

## 2024-04-29 VITALS
DIASTOLIC BLOOD PRESSURE: 84 MMHG | HEART RATE: 92 BPM | WEIGHT: 144 LBS | SYSTOLIC BLOOD PRESSURE: 139 MMHG | BODY MASS INDEX: 29.03 KG/M2 | HEIGHT: 59 IN

## 2024-04-29 DIAGNOSIS — F41.8 DEPRESSION WITH ANXIETY: ICD-10-CM

## 2024-04-29 DIAGNOSIS — I10 BENIGN ESSENTIAL HTN: ICD-10-CM

## 2024-04-29 DIAGNOSIS — M79.604 LEG PAIN, RIGHT: ICD-10-CM

## 2024-04-29 DIAGNOSIS — Z00.00 INITIAL MEDICARE ANNUAL WELLNESS VISIT: Primary | ICD-10-CM

## 2024-04-29 DIAGNOSIS — E03.9 ACQUIRED HYPOTHYROIDISM: ICD-10-CM

## 2024-04-29 DIAGNOSIS — K21.9 GASTROESOPHAGEAL REFLUX DISEASE WITHOUT ESOPHAGITIS: ICD-10-CM

## 2024-04-29 DIAGNOSIS — E78.00 HYPERCHOLESTEREMIA: ICD-10-CM

## 2024-04-29 DIAGNOSIS — D49.89 NEOPLASM OF LEG: ICD-10-CM

## 2024-04-29 DIAGNOSIS — E87.6 HYPOKALEMIA: ICD-10-CM

## 2024-04-29 RX ORDER — POTASSIUM CHLORIDE 20 MEQ/1
20 TABLET, EXTENDED RELEASE ORAL DAILY
Qty: 30 TABLET | Refills: 5 | Status: SHIPPED | OUTPATIENT
Start: 2024-04-29

## 2024-04-29 RX ORDER — PREDNISONE 20 MG/1
40 TABLET ORAL DAILY
Qty: 10 TABLET | Refills: 0 | Status: SHIPPED | OUTPATIENT
Start: 2024-04-29 | End: 2024-05-04

## 2024-04-29 SDOH — ECONOMIC STABILITY: FOOD INSECURITY: WITHIN THE PAST 12 MONTHS, THE FOOD YOU BOUGHT JUST DIDN'T LAST AND YOU DIDN'T HAVE MONEY TO GET MORE.: NEVER TRUE

## 2024-04-29 SDOH — ECONOMIC STABILITY: INCOME INSECURITY: HOW HARD IS IT FOR YOU TO PAY FOR THE VERY BASICS LIKE FOOD, HOUSING, MEDICAL CARE, AND HEATING?: NOT VERY HARD

## 2024-04-29 SDOH — ECONOMIC STABILITY: FOOD INSECURITY: WITHIN THE PAST 12 MONTHS, YOU WORRIED THAT YOUR FOOD WOULD RUN OUT BEFORE YOU GOT MONEY TO BUY MORE.: NEVER TRUE

## 2024-04-29 NOTE — PATIENT INSTRUCTIONS
or were.  To figure out your pack years, multiply how many packs a day on average (assuming 20 cigarettes per pack) you have smoked by how many years you have smoked. For example:  If you smoked 1 pack a day for 20 years, that's 1 times 20. So you have a smoking history of 20 pack years.  If you smoked 2 packs a day for 10 years, that's 2 times 10. So you have a smoking history of 20 pack years.  Experts agree that screening is for people who have a high risk of lung cancer. But experts don't agree on what high risk means. Some say people age 50 or older with at least a 20-pack-year smoking history are high risk. Others say it's people age 55 or older with a 30-pack-year history.  To see if you could benefit from screening, first find out if you are at high risk for lung cancer. Your doctor can help you decide your lung cancer risk.  What are the risks of screening?  CT screening for lung cancer isn't perfect. It can show an abnormal result when it turns out there wasn't any cancer. This is called a false-positive result. This means you may need more tests to make sure you don't have cancer. These tests can be harmful and cause a lot of worry.  These tests may include more CT scans and invasive testing like a lung biopsy. In a biopsy, the doctor takes a sample of tissue from inside your lung so it can be looked at under a microscope. A biopsy is the only way to tell if you have lung cancer. If the biopsy finds cancer, you and your doctor will have to decide how or whether to treat it.  Some lung cancers found on CT scans are harmless and would not have caused a problem if they had not been found through screening. But because doctors can't tell which ones will turn out to be harmless, most will be treated. This means that you may get treatment--including surgery, radiation, or chemotherapy--that you don't need.  There is a risk of damage to cells or tissue from being exposed to radiation, including the small amounts

## 2024-04-29 NOTE — PROGRESS NOTES
Medicare Annual Wellness Visit    Isabel Michaels is here for Medicare AWV, Hypertension (6 month check up. Discuss labs. ), Hyperlipidemia, Gastroesophageal Reflux, and Mental Health Problem    Assessment & Plan   Initial Medicare annual wellness visit  Benign essential HTN  -     Comprehensive Metabolic Panel; Future  Acquired hypothyroidism  -     TSH; Future  -     T4, Free; Future  Hypercholesteremia  -     Comprehensive Metabolic Panel; Future  -     Lipid Panel; Future  Depression with anxiety  Gastroesophageal reflux disease without esophagitis  Leg pain, right  Neoplasm of leg  -     predniSONE (DELTASONE) 20 MG tablet; Take 2 tablets by mouth daily for 5 days, Disp-10 tablet, R-0Normal  Hypokalemia  -     potassium chloride (KLOR-CON M20) 20 MEQ extended release tablet; Take 1 tablet by mouth daily, Disp-30 tablet, R-5Normal      Plan:  1. Initial Medicare annual wellness visit  See concerns and discussion.     2. Benign essential HTN  Controlled on Amlodipine and HCTZ.  No change in medication.  Obtain labs approximately one week prior to the office appointment.  Please fast for 12 hours prior to obtaining the labs.  Water or black coffee (no cream or sugar) is allowed prior to the the labs.    - Comprehensive Metabolic Panel; Future    3. Acquired hypothyroidism  Controlled on Levothyroxine.  No change in medication.  Obtain labs approximately one week prior to the office appointment.  Please fast for 12 hours prior to obtaining the labs.  Water or black coffee (no cream or sugar) is allowed prior to the the labs.    - TSH; Future  - T4, Free; Future    4. Hypercholesteremia  Controlled on Lipitor.  No change in medication.    Obtain labs approximately one week prior to the office appointment.  Please fast for 12 hours prior to obtaining the labs.  Water or black coffee (no cream or sugar) is allowed prior to the the labs.    - Comprehensive Metabolic Panel; Future  - Lipid Panel; Future    5.

## 2024-05-10 DIAGNOSIS — K21.9 GASTROESOPHAGEAL REFLUX DISEASE WITHOUT ESOPHAGITIS: ICD-10-CM

## 2024-05-10 RX ORDER — OMEPRAZOLE 20 MG/1
20 CAPSULE, DELAYED RELEASE ORAL DAILY
Qty: 90 CAPSULE | Refills: 1 | Status: SHIPPED | OUTPATIENT
Start: 2024-05-10

## 2024-05-14 ENCOUNTER — HOSPITAL ENCOUNTER (OUTPATIENT)
Age: 64
Setting detail: SPECIMEN
Discharge: HOME | End: 2024-05-14
Payer: MEDICARE

## 2024-05-14 ENCOUNTER — PROCEDURE VISIT (OUTPATIENT)
Dept: FAMILY MEDICINE CLINIC | Age: 64
End: 2024-05-14

## 2024-05-14 DIAGNOSIS — D49.2 NEOPLASM OF SKIN OF LOWER LEG: ICD-10-CM

## 2024-05-14 DIAGNOSIS — L98.9 CHANGING SKIN LESION: ICD-10-CM

## 2024-05-14 DIAGNOSIS — L82.1 VERRUCOUS KERATOSIS: Primary | ICD-10-CM

## 2024-05-14 PROCEDURE — 88305 TISSUE EXAM BY PATHOLOGIST: CPT

## 2024-05-14 NOTE — PATIENT INSTRUCTIONS
Survey:     You may be receiving a survey from [employer] regarding your visit today.     You may get this in the mail, through your MyChart or in your email.      Please complete the survey to enable us to provide the highest quality of care to you and your family. Please also, mention our names.     If you cannot score us as very good (5 Stars) on any question, please feel free to call the office to discuss how we could have made your experience exceptional.      Thank You!        Dr. Koroma, MD Gordno, RUBINA Leyva, AIDEE Medina CNP, AYAAL Arreaga MA

## 2024-05-14 NOTE — PROGRESS NOTES
Isabel Valladares (:  1960) is a 64 y.o. female,Established patient, here for evaluation of the following chief complaint(s):  Lesion(s) (Presents for shave biopsy neoplasm right lateral upper leg)      Assessment & Plan   1. Verrucous keratosis  -     BIOPSY OF SKIN LESION  2. Neoplasm of skin of lower leg  -     Surgical Pathology; Future  3. Changing skin lesion  -     Surgical Pathology; Future  -     BIOPSY OF SKIN LESION      Plan:  1. Neoplasm of skin of lower leg  The procedure was explained to Isabel including the reason for the procedure as well as the possible complications.  Informed consent was obtained.  The area was prepped in a normal sterile fashion using betadine swabs x 3.  The betadine was then cleared with alcohol swabs.  Anesthesia was achieved injecting 1 ml of 1% epinephrine with epinephrine.  The lesion was removed using a razor blade and sent for pathology.  Hemostasis was achieved post procedure with the use of electrocaudery.  Isabel tolerated the procedure well without complication.  Await pathology for diagnosis and further recommendations at that time.      2. Changing skin lesion      Pathology:  VERRUCAL KERATOSIS  - benign lesion.  No further treatment needed.       Subjective   Isabel presents to have a horned like lesion removed from the right lower leg that is getting larger.         Review of Systems   Skin:         Changing skin lesion.          Objective   Physical Exam  Skin:     Comments: Right lower leg with a 1 cm horned like lesion.                   An electronic signature was used to authenticate this note.    --Adán Koroma MD

## 2024-05-17 LAB — SURGICAL PATHOLOGY REPORT: NORMAL

## 2024-05-24 DIAGNOSIS — I10 BENIGN ESSENTIAL HTN: ICD-10-CM

## 2024-05-24 RX ORDER — AMLODIPINE BESYLATE 5 MG/1
5 TABLET ORAL DAILY
Qty: 90 TABLET | Refills: 1 | Status: SHIPPED | OUTPATIENT
Start: 2024-05-24

## 2024-06-18 DIAGNOSIS — N95.9 POSTMENOPAUSAL SYMPTOMS: ICD-10-CM

## 2024-06-28 ENCOUNTER — OFFICE VISIT (OUTPATIENT)
Dept: FAMILY MEDICINE CLINIC | Age: 64
End: 2024-06-28

## 2024-06-28 ENCOUNTER — TELEPHONE (OUTPATIENT)
Dept: FAMILY MEDICINE CLINIC | Age: 64
End: 2024-06-28

## 2024-06-28 VITALS
DIASTOLIC BLOOD PRESSURE: 82 MMHG | SYSTOLIC BLOOD PRESSURE: 138 MMHG | HEIGHT: 59 IN | BODY MASS INDEX: 29.03 KG/M2 | WEIGHT: 144 LBS | HEART RATE: 98 BPM

## 2024-06-28 DIAGNOSIS — J01.90 ACUTE BACTERIAL SINUSITIS: ICD-10-CM

## 2024-06-28 DIAGNOSIS — K57.92 DIVERTICULITIS: Primary | ICD-10-CM

## 2024-06-28 DIAGNOSIS — B96.89 ACUTE BACTERIAL SINUSITIS: ICD-10-CM

## 2024-06-28 RX ORDER — CIPROFLOXACIN 500 MG/1
500 TABLET, FILM COATED ORAL 2 TIMES DAILY
Qty: 20 TABLET | Refills: 0 | Status: SHIPPED | OUTPATIENT
Start: 2024-06-28 | End: 2024-07-08

## 2024-06-28 ASSESSMENT — ENCOUNTER SYMPTOMS
SHORTNESS OF BREATH: 0
RHINORRHEA: 0
BLOOD IN STOOL: 0
SORE THROAT: 0
ABDOMINAL PAIN: 1
NAUSEA: 0
CONSTIPATION: 0
DIARRHEA: 1
CHEST TIGHTNESS: 0
COUGH: 0

## 2024-06-28 NOTE — TELEPHONE ENCOUNTER
Pt called in with a complain of a diverticulitis flare up and would like something called in. Pt states that she always gets yeast infections with the antibiotic and would like something for that as well.

## 2024-06-28 NOTE — PATIENT INSTRUCTIONS
Survey:     You may be receiving a survey from [facility] regarding your visit today.     You may get this in the mail, through your MyChart or in your email.      Please complete the survey to enable us to provide the highest quality of care to you and your family. Please also, mention our names.     If you cannot score us as very good (5 Stars) on any question, please feel free to call the office to discuss how we could have made your experience exceptional.      Thank You!        Dr. Koroma, MD Gordon, RUBINA Leyva, AYALA Deleon, AIDEE Grant, AYALA Arreaga, AYALA       Plan:  1. Diverticulitis  Take Cipro 500 mg two times a day x 10 days.    - ciprofloxacin (CIPRO) 500 MG tablet; Take 1 tablet by mouth 2 times daily for 10 days  Dispense: 20 tablet; Refill: 0    2. Acute bacterial sinusitis  Take Cipro 500 mg two times a day x 10 days.   - ciprofloxacin (CIPRO) 500 MG tablet; Take 1 tablet by mouth 2 times daily for 10 days  Dispense: 20 tablet; Refill: 0    Isabel is instructed to return to the clinic if the symptoms continue or worsen.  Isabel  was also instructed to go to the emergency room department if the symptoms significantly worsen before an appointment can be made.

## 2024-06-28 NOTE — PROGRESS NOTES
Isabel TIERNEY [facility] (:  1960) is a 64 y.o. female,Established patient, here for evaluation of the following chief complaint(s):  Diverticulitis (Abdominal cramps, frequent bowel movements.) and Allergies (Head congestion, sneezing, eyes irritated. )      Assessment & Plan   1. Diverticulitis  -     ciprofloxacin (CIPRO) 500 MG tablet; Take 1 tablet by mouth 2 times daily for 10 days, Disp-20 tablet, R-0Normal  2. Acute bacterial sinusitis  -     ciprofloxacin (CIPRO) 500 MG tablet; Take 1 tablet by mouth 2 times daily for 10 days, Disp-20 tablet, R-0Normal      Plan:  1. Diverticulitis  Take Cipro 500 mg two times a day x 10 days.    - ciprofloxacin (CIPRO) 500 MG tablet; Take 1 tablet by mouth 2 times daily for 10 days  Dispense: 20 tablet; Refill: 0    2. Acute bacterial sinusitis  Take Cipro 500 mg two times a day x 10 days.   - ciprofloxacin (CIPRO) 500 MG tablet; Take 1 tablet by mouth 2 times daily for 10 days  Dispense: 20 tablet; Refill: 0    Isabel is instructed to return to the clinic if the symptoms continue or worsen.  Isabel  was also instructed to go to the emergency room department if the symptoms significantly worsen before an appointment can be made.             Subjective   Isabel complains of abdominal pain that started 10 days ago off and on.  Today the pain is more consistent in the LLQ.  No nausea or vomiting.  She has been having diarrhea with the increase in pain.  No fever or chills.  Food seems to make the pain worse.  She has noticed some pain with urinating.      Allergy symptoms are not controlled on Flonase and Zyrtec (thinks it might be an infection).  This has been worse lately.  She feels she has a sinus infection.        Review of Systems   Constitutional: Negative.    HENT:  Positive for congestion. Negative for ear pain, rhinorrhea, sneezing and sore throat.    Eyes:  Negative for visual disturbance.   Respiratory:  Negative for cough, chest tightness and shortness of breath.

## 2024-07-08 ENCOUNTER — TELEPHONE (OUTPATIENT)
Dept: FAMILY MEDICINE CLINIC | Age: 64
End: 2024-07-08

## 2024-07-08 DIAGNOSIS — B37.31 VAGINAL CANDIDIASIS: Primary | ICD-10-CM

## 2024-07-08 RX ORDER — FLUCONAZOLE 150 MG/1
150 TABLET ORAL ONCE
Qty: 1 TABLET | Refills: 0 | Status: SHIPPED | OUTPATIENT
Start: 2024-07-08 | End: 2024-07-08

## 2024-07-16 DIAGNOSIS — I10 BENIGN ESSENTIAL HTN: ICD-10-CM

## 2024-07-16 RX ORDER — HYDROCHLOROTHIAZIDE 25 MG/1
25 TABLET ORAL DAILY
Qty: 90 TABLET | Refills: 1 | Status: SHIPPED | OUTPATIENT
Start: 2024-07-16

## 2024-07-16 NOTE — TELEPHONE ENCOUNTER
Last ov 6/28/24 acute, 4/29/24 AWV    Next OV 10/29/24     Requesting refill on hydrochlorothiazide thru [facility].  Rx pending

## 2024-09-10 ENCOUNTER — OFFICE VISIT (OUTPATIENT)
Dept: FAMILY MEDICINE CLINIC | Age: 64
End: 2024-09-10

## 2024-09-10 VITALS
SYSTOLIC BLOOD PRESSURE: 128 MMHG | HEART RATE: 91 BPM | DIASTOLIC BLOOD PRESSURE: 81 MMHG | HEIGHT: 59 IN | WEIGHT: 141 LBS | BODY MASS INDEX: 28.43 KG/M2

## 2024-09-10 DIAGNOSIS — Z20.822 SUSPECTED COVID-19 VIRUS INFECTION: ICD-10-CM

## 2024-09-10 DIAGNOSIS — R51.9 HEADACHE ABOVE THE EYE REGION: Primary | ICD-10-CM

## 2024-09-10 LAB
KIT LOT NO., HCLOLOT: NORMAL
SARS-COV-2, POC: NORMAL
VALID INTERNAL CONTROL, POC: PRESENT
VENDOR AND KIT NAME POC: NORMAL

## 2024-09-10 ASSESSMENT — ENCOUNTER SYMPTOMS
SINUS PAIN: 0
CONSTIPATION: 0
SINUS PRESSURE: 0
SHORTNESS OF BREATH: 0
COUGH: 0

## 2024-09-10 ASSESSMENT — VISUAL ACUITY: OU: 1

## 2024-10-18 ENCOUNTER — HOSPITAL ENCOUNTER (OUTPATIENT)
Age: 64
Discharge: HOME | End: 2024-10-18
Payer: MEDICARE

## 2024-10-18 DIAGNOSIS — I10 BENIGN ESSENTIAL HTN: ICD-10-CM

## 2024-10-18 DIAGNOSIS — E03.9 ACQUIRED HYPOTHYROIDISM: ICD-10-CM

## 2024-10-18 DIAGNOSIS — E78.00 HYPERCHOLESTEREMIA: ICD-10-CM

## 2024-10-18 LAB
ALBUMIN SERPL-MCNC: 4.2 G/DL (ref 3.5–5.2)
ALP SERPL-CCNC: 96 U/L (ref 35–104)
ALT SERPL-CCNC: 15 U/L (ref 5–33)
ANION GAP SERPL CALCULATED.3IONS-SCNC: 8 MMOL/L (ref 9–17)
AST SERPL-CCNC: 17 U/L
BILIRUB SERPL-MCNC: 0.7 MG/DL (ref 0.3–1.2)
BUN SERPL-MCNC: 11 MG/DL (ref 8–23)
BUN/CREAT SERPL: 16 (ref 9–20)
CALCIUM SERPL-MCNC: 9.3 MG/DL (ref 8.6–10.4)
CHLORIDE SERPL-SCNC: 96 MMOL/L (ref 98–107)
CHOLEST SERPL-MCNC: 196 MG/DL (ref 0–199)
CHOLESTEROL/HDL RATIO: 3
CO2 SERPL-SCNC: 31 MMOL/L (ref 20–31)
CREAT SERPL-MCNC: 0.7 MG/DL (ref 0.5–0.9)
GFR, ESTIMATED: >90 ML/MIN/1.73M2
GLUCOSE SERPL-MCNC: 99 MG/DL (ref 70–99)
HDLC SERPL-MCNC: 61 MG/DL
LDLC SERPL CALC-MCNC: 101 MG/DL (ref 0–100)
POTASSIUM SERPL-SCNC: 3.7 MMOL/L (ref 3.7–5.3)
PROT SERPL-MCNC: 7.6 G/DL (ref 6.4–8.3)
SODIUM SERPL-SCNC: 135 MMOL/L (ref 135–144)
T4 FREE SERPL-MCNC: 1.2 NG/DL (ref 0.92–1.68)
TRIGL SERPL-MCNC: 171 MG/DL
TSH SERPL DL<=0.05 MIU/L-ACNC: 4.14 UIU/ML (ref 0.3–5)
VLDLC SERPL CALC-MCNC: 34 MG/DL

## 2024-10-18 PROCEDURE — 80053 COMPREHEN METABOLIC PANEL: CPT

## 2024-10-18 PROCEDURE — 84443 ASSAY THYROID STIM HORMONE: CPT

## 2024-10-18 PROCEDURE — 84439 ASSAY OF FREE THYROXINE: CPT

## 2024-10-18 PROCEDURE — 36415 COLL VENOUS BLD VENIPUNCTURE: CPT

## 2024-10-18 PROCEDURE — 80061 LIPID PANEL: CPT

## 2024-10-23 RX ORDER — ATORVASTATIN CALCIUM 40 MG/1
40 TABLET, FILM COATED ORAL DAILY
Qty: 90 TABLET | Refills: 1 | Status: SHIPPED | OUTPATIENT
Start: 2024-10-23

## 2024-10-29 ENCOUNTER — OFFICE VISIT (OUTPATIENT)
Dept: FAMILY MEDICINE CLINIC | Age: 64
End: 2024-10-29

## 2024-10-29 VITALS
HEART RATE: 82 BPM | SYSTOLIC BLOOD PRESSURE: 136 MMHG | DIASTOLIC BLOOD PRESSURE: 82 MMHG | WEIGHT: 143 LBS | HEIGHT: 59 IN | BODY MASS INDEX: 28.83 KG/M2

## 2024-10-29 DIAGNOSIS — B96.89 ACUTE BACTERIAL SINUSITIS: Primary | ICD-10-CM

## 2024-10-29 DIAGNOSIS — K21.9 GASTROESOPHAGEAL REFLUX DISEASE WITHOUT ESOPHAGITIS: ICD-10-CM

## 2024-10-29 DIAGNOSIS — Z23 NEED FOR INFLUENZA VACCINATION: ICD-10-CM

## 2024-10-29 DIAGNOSIS — J01.90 ACUTE BACTERIAL SINUSITIS: Primary | ICD-10-CM

## 2024-10-29 DIAGNOSIS — I10 BENIGN ESSENTIAL HTN: ICD-10-CM

## 2024-10-29 DIAGNOSIS — E78.00 HYPERCHOLESTEREMIA: ICD-10-CM

## 2024-10-29 DIAGNOSIS — F41.8 DEPRESSION WITH ANXIETY: ICD-10-CM

## 2024-10-29 DIAGNOSIS — E03.9 ACQUIRED HYPOTHYROIDISM: ICD-10-CM

## 2024-10-29 RX ORDER — FLUCONAZOLE 150 MG/1
150 TABLET ORAL DAILY
Qty: 2 TABLET | Refills: 0 | Status: SHIPPED | OUTPATIENT
Start: 2024-10-29

## 2024-10-29 ASSESSMENT — ENCOUNTER SYMPTOMS
CONSTIPATION: 0
SHORTNESS OF BREATH: 0
SORE THROAT: 0
COUGH: 0
DIARRHEA: 0
NAUSEA: 0
BLOOD IN STOOL: 0
RHINORRHEA: 0
CHEST TIGHTNESS: 0
ABDOMINAL PAIN: 0

## 2024-10-29 NOTE — ASSESSMENT & PLAN NOTE
Controlled on Amlodipine and HCTZ.  No change in medication.    Orders:    Comprehensive Metabolic Panel; Future

## 2024-10-29 NOTE — PROGRESS NOTES
Vaccine Information Sheet, \"Influenza - Inactivated\"  given to Isabel Michaels, or parent/legal guardian of  Isabel Michaels and verbalized understanding.    Patient responses:    Have you ever had a reaction to a flu vaccine? No  Are you able to eat eggs without adverse effects?  Yes  Do you have any current illness?  No  Have you ever had [place] Syndrome?  No    Flu vaccine given per order. Please see immunization tab.            
tenderness.   Musculoskeletal:         General: Normal range of motion.      Cervical back: Normal range of motion and neck supple.   Lymphadenopathy:      Cervical: No cervical adenopathy.   Skin:     Findings: No rash.   Neurological:      Mental Status: She is alert.   Psychiatric:         Behavior: Behavior normal.         Thought Content: Thought content normal.                  An electronic signature was used to authenticate this note.    --Adán Koroma MD

## 2024-10-29 NOTE — ASSESSMENT & PLAN NOTE
Controlled on Lipitor.  No change in medication.    Isabel was instructed to follow up in the clinic in 6 months for check up or as needed with any medical issues.            Orders:    Comprehensive Metabolic Panel; Future    Lipid Panel; Future

## 2024-11-08 DIAGNOSIS — K21.9 GASTROESOPHAGEAL REFLUX DISEASE WITHOUT ESOPHAGITIS: ICD-10-CM

## 2024-11-18 DIAGNOSIS — I10 BENIGN ESSENTIAL HTN: ICD-10-CM

## 2024-11-18 RX ORDER — AMLODIPINE BESYLATE 5 MG/1
5 TABLET ORAL DAILY
Qty: 90 TABLET | Refills: 1 | Status: SHIPPED | OUTPATIENT
Start: 2024-11-18

## 2024-12-13 DIAGNOSIS — N95.9 POSTMENOPAUSAL SYMPTOMS: ICD-10-CM

## 2024-12-13 NOTE — TELEPHONE ENCOUNTER
Last OV 10/29/24     Next OV 5/27/25    Requesting refill on sertraline thru [facility]   Rx pending

## 2025-01-14 DIAGNOSIS — I10 BENIGN ESSENTIAL HTN: ICD-10-CM

## 2025-01-14 RX ORDER — HYDROCHLOROTHIAZIDE 25 MG/1
25 TABLET ORAL DAILY
Qty: 90 TABLET | Refills: 1 | Status: SHIPPED | OUTPATIENT
Start: 2025-01-14

## 2025-01-14 NOTE — TELEPHONE ENCOUNTER
Last OV 10/29/24     Next OV 5/27/25    Requesting refill on hydrochlorothiazide thru [facility]   Rx pending

## 2025-03-24 ENCOUNTER — OFFICE VISIT (OUTPATIENT)
Dept: FAMILY MEDICINE CLINIC | Age: 65
End: 2025-03-24

## 2025-03-24 VITALS
DIASTOLIC BLOOD PRESSURE: 84 MMHG | BODY MASS INDEX: 29.43 KG/M2 | HEART RATE: 88 BPM | WEIGHT: 146 LBS | HEIGHT: 59 IN | SYSTOLIC BLOOD PRESSURE: 138 MMHG

## 2025-03-24 DIAGNOSIS — B96.89 ACUTE BACTERIAL SINUSITIS: Primary | ICD-10-CM

## 2025-03-24 DIAGNOSIS — J01.90 ACUTE BACTERIAL SINUSITIS: Primary | ICD-10-CM

## 2025-03-24 RX ORDER — FLUCONAZOLE 150 MG/1
150 TABLET ORAL ONCE
Qty: 1 TABLET | Refills: 0 | Status: SHIPPED | OUTPATIENT
Start: 2025-03-24 | End: 2025-03-24

## 2025-03-24 SDOH — ECONOMIC STABILITY: FOOD INSECURITY: WITHIN THE PAST 12 MONTHS, THE FOOD YOU BOUGHT JUST DIDN'T LAST AND YOU DIDN'T HAVE MONEY TO GET MORE.: NEVER TRUE

## 2025-03-24 SDOH — ECONOMIC STABILITY: FOOD INSECURITY: WITHIN THE PAST 12 MONTHS, YOU WORRIED THAT YOUR FOOD WOULD RUN OUT BEFORE YOU GOT MONEY TO BUY MORE.: NEVER TRUE

## 2025-03-24 ASSESSMENT — ENCOUNTER SYMPTOMS
BLOOD IN STOOL: 0
RHINORRHEA: 0
CONSTIPATION: 0
SINUS PRESSURE: 1
SHORTNESS OF BREATH: 0
CHEST TIGHTNESS: 0
DIARRHEA: 0
ABDOMINAL PAIN: 0
SORE THROAT: 0
COUGH: 0
NAUSEA: 0

## 2025-03-24 ASSESSMENT — PATIENT HEALTH QUESTIONNAIRE - PHQ9
5. POOR APPETITE OR OVEREATING: NOT AT ALL
1. LITTLE INTEREST OR PLEASURE IN DOING THINGS: NOT AT ALL
7. TROUBLE CONCENTRATING ON THINGS, SUCH AS READING THE NEWSPAPER OR WATCHING TELEVISION: NOT AT ALL
6. FEELING BAD ABOUT YOURSELF - OR THAT YOU ARE A FAILURE OR HAVE LET YOURSELF OR YOUR FAMILY DOWN: NOT AT ALL
3. TROUBLE FALLING OR STAYING ASLEEP: NOT AT ALL
8. MOVING OR SPEAKING SO SLOWLY THAT OTHER PEOPLE COULD HAVE NOTICED. OR THE OPPOSITE, BEING SO FIGETY OR RESTLESS THAT YOU HAVE BEEN MOVING AROUND A LOT MORE THAN USUAL: NOT AT ALL
SUM OF ALL RESPONSES TO PHQ QUESTIONS 1-9: 0
10. IF YOU CHECKED OFF ANY PROBLEMS, HOW DIFFICULT HAVE THESE PROBLEMS MADE IT FOR YOU TO DO YOUR WORK, TAKE CARE OF THINGS AT HOME, OR GET ALONG WITH OTHER PEOPLE: NOT DIFFICULT AT ALL
SUM OF ALL RESPONSES TO PHQ QUESTIONS 1-9: 0
SUM OF ALL RESPONSES TO PHQ QUESTIONS 1-9: 0
9. THOUGHTS THAT YOU WOULD BE BETTER OFF DEAD, OR OF HURTING YOURSELF: NOT AT ALL
2. FEELING DOWN, DEPRESSED OR HOPELESS: NOT AT ALL
SUM OF ALL RESPONSES TO PHQ QUESTIONS 1-9: 0
4. FEELING TIRED OR HAVING LITTLE ENERGY: NOT AT ALL

## 2025-03-24 NOTE — PROGRESS NOTES
Isabel Valladares (:  1960) is a 65 y.o. female,Established patient, here for evaluation of the following chief complaint(s):  Sinusitis (Sx's 1 month with head congestion, h/a, sinus drainage, ear pain. Denies fever. Has tried Afron with little relief, takes zyrtec and Flonase daily. )         Assessment & Plan  Acute bacterial sinusitis   Take Augmentin 875 mg two times a day x 7 days.  Take Diflucan 150 mg once after the antibiotic finished.      Orders:    amoxicillin-clavulanate (AUGMENTIN) 875-125 MG per tablet; Take 1 tablet by mouth 2 times daily for 7 days    Isabel is instructed to return to the clinic if the symptoms continue or worsen.  Isabel  was also instructed to go to the emergency room department if the symptoms significantly worsen before an appointment can be made.           Subjective   Sinusitis  This is a new problem. The current episode started more than 1 month ago. The problem has been waxing and waning since onset. There has been no fever. The pain is moderate. Associated symptoms include chills, congestion, ear pain, headaches and sinus pressure. Pertinent negatives include no coughing, neck pain, shortness of breath, sneezing or sore throat. Treatments tried: Tylenol sinus / congestion. The treatment provided mild relief.       Review of Systems   Constitutional:  Positive for chills.   HENT:  Positive for congestion, ear pain and sinus pressure. Negative for rhinorrhea, sneezing and sore throat.    Eyes:  Negative for visual disturbance.   Respiratory:  Negative for cough, chest tightness and shortness of breath.    Cardiovascular:  Negative for chest pain and palpitations.   Gastrointestinal:  Negative for abdominal pain, blood in stool, constipation, diarrhea and nausea.   Genitourinary:  Negative for difficulty urinating, dysuria, frequency, menstrual problem and urgency.   Musculoskeletal:  Negative for arthralgias, joint swelling, myalgias and neck pain.   Skin: Negative.

## 2025-03-24 NOTE — PATIENT INSTRUCTIONS
Assessment & Plan  Acute bacterial sinusitis   Take Augmentin 875 mg two times a day x 7 days.  Take Diflucan 150 mg once after the antibiotic finished.      Orders:    amoxicillin-clavulanate (AUGMENTIN) 875-125 MG per tablet; Take 1 tablet by mouth 2 times daily for 7 days    Isabel is instructed to return to the clinic if the symptoms continue or worsen.  Isabel  was also instructed to go to the emergency room department if the symptoms significantly worsen before an appointment can be made.

## 2025-04-18 DIAGNOSIS — E87.6 HYPOKALEMIA: ICD-10-CM

## 2025-04-18 RX ORDER — POTASSIUM CHLORIDE 1500 MG/1
20 TABLET, EXTENDED RELEASE ORAL DAILY
Qty: 90 TABLET | Refills: 1 | Status: SHIPPED | OUTPATIENT
Start: 2025-04-18

## 2025-04-24 RX ORDER — ATORVASTATIN CALCIUM 40 MG/1
40 TABLET, FILM COATED ORAL DAILY
Qty: 90 TABLET | Refills: 1 | Status: SHIPPED | OUTPATIENT
Start: 2025-04-24

## 2025-05-18 DIAGNOSIS — I10 BENIGN ESSENTIAL HTN: ICD-10-CM

## 2025-05-19 RX ORDER — AMLODIPINE BESYLATE 5 MG/1
5 TABLET ORAL DAILY
Qty: 90 TABLET | Refills: 1 | Status: SHIPPED | OUTPATIENT
Start: 2025-05-19

## 2025-05-21 ENCOUNTER — HOSPITAL ENCOUNTER (OUTPATIENT)
Age: 65
Discharge: HOME | End: 2025-05-21
Payer: MEDICARE

## 2025-05-21 DIAGNOSIS — E78.00 HYPERCHOLESTEREMIA: ICD-10-CM

## 2025-05-21 DIAGNOSIS — I10 BENIGN ESSENTIAL HTN: ICD-10-CM

## 2025-05-21 LAB
ALBUMIN SERPL-MCNC: 4.2 G/DL (ref 3.5–5.2)
ALBUMIN/GLOB SERPL: 1.3 {RATIO} (ref 1–2.5)
ALP SERPL-CCNC: 92 U/L (ref 35–104)
ALT SERPL-CCNC: 12 U/L (ref 5–33)
ANION GAP SERPL CALCULATED.3IONS-SCNC: 11 MMOL/L (ref 9–17)
AST SERPL-CCNC: 18 U/L
BILIRUB SERPL-MCNC: 0.7 MG/DL (ref 0.3–1.2)
BUN SERPL-MCNC: 15 MG/DL (ref 8–23)
CALCIUM SERPL-MCNC: 9 MG/DL (ref 8.6–10.4)
CHLORIDE SERPL-SCNC: 99 MMOL/L (ref 98–107)
CHOLEST SERPL-MCNC: 181 MG/DL (ref 0–199)
CHOLESTEROL/HDL RATIO: 3.1
CO2 SERPL-SCNC: 28 MMOL/L (ref 20–31)
CREAT SERPL-MCNC: 0.7 MG/DL (ref 0.5–0.9)
GFR, ESTIMATED: >90 ML/MIN/1.73M2
GLUCOSE SERPL-MCNC: 98 MG/DL (ref 70–99)
HDLC SERPL-MCNC: 58 MG/DL
LDLC SERPL CALC-MCNC: 89 MG/DL (ref 0–100)
POTASSIUM SERPL-SCNC: 3.8 MMOL/L (ref 3.7–5.3)
PROT SERPL-MCNC: 7.5 G/DL (ref 6.4–8.3)
SODIUM SERPL-SCNC: 138 MMOL/L (ref 135–144)
TRIGL SERPL-MCNC: 170 MG/DL
VLDLC SERPL CALC-MCNC: 34 MG/DL (ref 1–30)

## 2025-05-21 PROCEDURE — 36415 COLL VENOUS BLD VENIPUNCTURE: CPT

## 2025-05-21 PROCEDURE — 80053 COMPREHEN METABOLIC PANEL: CPT

## 2025-05-21 PROCEDURE — 80061 LIPID PANEL: CPT

## 2025-05-24 SDOH — HEALTH STABILITY: PHYSICAL HEALTH: ON AVERAGE, HOW MANY MINUTES DO YOU ENGAGE IN EXERCISE AT THIS LEVEL?: 20 MIN

## 2025-05-24 SDOH — HEALTH STABILITY: PHYSICAL HEALTH: ON AVERAGE, HOW MANY DAYS PER WEEK DO YOU ENGAGE IN MODERATE TO STRENUOUS EXERCISE (LIKE A BRISK WALK)?: 2 DAYS

## 2025-05-24 ASSESSMENT — PATIENT HEALTH QUESTIONNAIRE - PHQ9
SUM OF ALL RESPONSES TO PHQ QUESTIONS 1-9: 0
1. LITTLE INTEREST OR PLEASURE IN DOING THINGS: NOT AT ALL
2. FEELING DOWN, DEPRESSED OR HOPELESS: NOT AT ALL
SUM OF ALL RESPONSES TO PHQ QUESTIONS 1-9: 0

## 2025-05-24 ASSESSMENT — LIFESTYLE VARIABLES
HOW OFTEN DO YOU HAVE A DRINK CONTAINING ALCOHOL: 2-3 TIMES A WEEK
HOW MANY STANDARD DRINKS CONTAINING ALCOHOL DO YOU HAVE ON A TYPICAL DAY: PATIENT DECLINED
HOW OFTEN DO YOU HAVE SIX OR MORE DRINKS ON ONE OCCASION: 98
HOW OFTEN DO YOU HAVE A DRINK CONTAINING ALCOHOL: 4
HOW MANY STANDARD DRINKS CONTAINING ALCOHOL DO YOU HAVE ON A TYPICAL DAY: 98

## 2025-05-27 ENCOUNTER — OFFICE VISIT (OUTPATIENT)
Dept: FAMILY MEDICINE CLINIC | Age: 65
End: 2025-05-27

## 2025-05-27 VITALS
HEIGHT: 60 IN | DIASTOLIC BLOOD PRESSURE: 82 MMHG | HEART RATE: 90 BPM | BODY MASS INDEX: 28.66 KG/M2 | SYSTOLIC BLOOD PRESSURE: 139 MMHG | WEIGHT: 146 LBS

## 2025-05-27 DIAGNOSIS — Z12.31 OTHER SCREENING MAMMOGRAM: ICD-10-CM

## 2025-05-27 DIAGNOSIS — Z87.891 PERSONAL HISTORY OF TOBACCO USE: ICD-10-CM

## 2025-05-27 DIAGNOSIS — E03.9 ACQUIRED HYPOTHYROIDISM: ICD-10-CM

## 2025-05-27 DIAGNOSIS — F41.8 DEPRESSION WITH ANXIETY: ICD-10-CM

## 2025-05-27 DIAGNOSIS — I10 BENIGN ESSENTIAL HTN: ICD-10-CM

## 2025-05-27 DIAGNOSIS — Z00.00 MEDICARE ANNUAL WELLNESS VISIT, SUBSEQUENT: Primary | ICD-10-CM

## 2025-05-27 DIAGNOSIS — E78.00 HYPERCHOLESTEREMIA: ICD-10-CM

## 2025-05-27 NOTE — PATIENT INSTRUCTIONS
Survey:     You may be receiving a survey from [employer] regarding your visit today.     You may get this in the mail, through your MyChart or in your email.      Please complete the survey to enable us to provide the highest quality of care to you and your family. Please also, mention our names.     If you cannot score us as very good (5 Stars) on any question, please feel free to call the office to discuss how we could have made your experience exceptional.      Thank You!        Dr. Koroma, MD Gordon, RUBINA Leyva, AYALA Deleon, AIDEE Grant, AYALA Gallardo, AYALA          Learning About Being Active as an Older Adult  Why is being active important as you get older?     Being active is one of the best things you can do for your health. And it's never too late to start. Being active--or getting active, if you aren't already--has definite benefits. It can:  Give you more energy,  Keep your mind sharp.  Improve balance to reduce your risk of falls.  Help you manage chronic illness with fewer medicines.  No matter how old you are, how fit you are, or what health problems you have, there is a form of activity that will work for you. And the more physical activity you can do, the better your overall health will be.  What kinds of activity can help you stay healthy?  Being more active will make your daily activities easier. Physical activity includes planned exercise and things you do in daily life. There are four types of activity:  Aerobic.  Doing aerobic activity makes your heart and lungs strong.  Includes walking, dancing, and gardening.  Aim for at least 2½ hours spread throughout the week.  It improves your energy and can help you sleep better.  Muscle-strengthening.  This type of activity can help maintain muscle and strengthen bones.  Includes climbing stairs, using resistance bands, and lifting or carrying heavy loads.  Aim for at least twice a week.  It can help protect the knees and other

## 2025-05-27 NOTE — PROGRESS NOTES
Vital Sign     Days of Exercise per Week: 2 days     Minutes of Exercise per Session: 20 min   Stress: Not on file   Social Connections: Unknown (11/13/2019)    [facility]    Social Connection and Isolation Panel [NHANES]     Frequency of Communication with Friends and Family: Twice a week     Frequency of Social Gatherings with Friends and Family: Not on file     Attends [employer] Services: Not on file     Active Member of Clubs or Organizations: Not on file     Attends Club or Organization Meetings: Not on file     Marital Status:    Intimate Partner Violence: Not on file   Housing Stability: Low Risk  (3/24/2025)    Housing Stability Vital Sign     Unable to Pay for Housing in the Last Year: No     Number of Times Moved in the Last Year: 0     Homeless in the Last Year: No       Family History   Problem Relation Age of Onset    Heart Disease Mother     Heart Disease Father     Ovarian Cancer Sister     Cancer Sister        Current Outpatient Medications on File Prior to Visit   Medication Sig Dispense Refill    amLODIPine (NORVASC) 5 MG tablet TAKE 1 TABLET BY MOUTH DAILY 90 tablet 1    atorvastatin (LIPITOR) 40 MG tablet TAKE 1 TABLET BY MOUTH DAILY 90 tablet 1    KLOR-CON M20 20 MEQ extended release tablet TAKE 1 TABLET BY MOUTH DAILY 90 tablet 1    hydroCHLOROthiazide (HYDRODIURIL) 25 MG tablet TAKE 1 TABLET BY MOUTH DAILY 90 tablet 1    sertraline (ZOLOFT) 50 MG tablet TAKE 1 TABLET BY MOUTH DAILY 90 tablet 1    omeprazole (PRILOSEC) 20 MG delayed release capsule TAKE 1 CAPSULE BY MOUTH DAILY 90 capsule 1    fluticasone (FLONASE) 50 MCG/ACT nasal spray SPRAY TWO SPRAYS IN EACH NOSTRIL ONCE DAILY 1 each 2    cetirizine (ZYRTEC) 10 MG tablet Take 1 tablet by mouth daily       No current facility-administered medications on file prior to visit.       Allergies   Allergen Reactions    Alendronate Sodium Nausea Only     Body aches    Ace Inhibitors Other (See Comments)     Causes a

## 2025-06-05 ENCOUNTER — HOSPITAL ENCOUNTER (OUTPATIENT)
Dept: MAMMOGRAPHY | Age: 65
Discharge: HOME | End: 2025-06-07
Attending: INTERNAL MEDICINE
Payer: MEDICARE

## 2025-06-05 ENCOUNTER — HOSPITAL ENCOUNTER (OUTPATIENT)
Dept: CT IMAGING | Age: 65
Discharge: HOME | End: 2025-06-07
Attending: INTERNAL MEDICINE
Payer: MEDICARE

## 2025-06-05 VITALS — WEIGHT: 145 LBS | BODY MASS INDEX: 29.23 KG/M2 | HEIGHT: 59 IN

## 2025-06-05 DIAGNOSIS — Z12.31 OTHER SCREENING MAMMOGRAM: ICD-10-CM

## 2025-06-05 DIAGNOSIS — Z87.891 PERSONAL HISTORY OF TOBACCO USE: ICD-10-CM

## 2025-06-05 PROCEDURE — 77063 BREAST TOMOSYNTHESIS BI: CPT

## 2025-06-05 PROCEDURE — 71271 CT THORAX LUNG CANCER SCR C-: CPT

## 2025-06-09 ENCOUNTER — OFFICE VISIT (OUTPATIENT)
Dept: FAMILY MEDICINE CLINIC | Age: 65
End: 2025-06-09
Payer: MEDICARE

## 2025-06-09 VITALS — WEIGHT: 146 LBS | TEMPERATURE: 98.1 F | HEIGHT: 59 IN | BODY MASS INDEX: 29.43 KG/M2

## 2025-06-09 DIAGNOSIS — J01.90 ACUTE BACTERIAL SINUSITIS: Primary | ICD-10-CM

## 2025-06-09 DIAGNOSIS — B96.89 ACUTE BACTERIAL SINUSITIS: Primary | ICD-10-CM

## 2025-06-09 PROCEDURE — G8419 CALC BMI OUT NRM PARAM NOF/U: HCPCS | Performed by: LICENSED PRACTICAL NURSE

## 2025-06-09 PROCEDURE — 1090F PRES/ABSN URINE INCON ASSESS: CPT | Performed by: LICENSED PRACTICAL NURSE

## 2025-06-09 PROCEDURE — 3017F COLORECTAL CA SCREEN DOC REV: CPT | Performed by: LICENSED PRACTICAL NURSE

## 2025-06-09 PROCEDURE — G8399 PT W/DXA RESULTS DOCUMENT: HCPCS | Performed by: LICENSED PRACTICAL NURSE

## 2025-06-09 PROCEDURE — 4004F PT TOBACCO SCREEN RCVD TLK: CPT | Performed by: LICENSED PRACTICAL NURSE

## 2025-06-09 PROCEDURE — 99213 OFFICE O/P EST LOW 20 MIN: CPT | Performed by: LICENSED PRACTICAL NURSE

## 2025-06-09 PROCEDURE — 1123F ACP DISCUSS/DSCN MKR DOCD: CPT | Performed by: LICENSED PRACTICAL NURSE

## 2025-06-09 PROCEDURE — 1159F MED LIST DOCD IN RCRD: CPT | Performed by: LICENSED PRACTICAL NURSE

## 2025-06-09 PROCEDURE — 1160F RVW MEDS BY RX/DR IN RCRD: CPT | Performed by: LICENSED PRACTICAL NURSE

## 2025-06-09 PROCEDURE — G8427 DOCREV CUR MEDS BY ELIG CLIN: HCPCS | Performed by: LICENSED PRACTICAL NURSE

## 2025-06-09 ASSESSMENT — ENCOUNTER SYMPTOMS
COUGH: 1
SHORTNESS OF BREATH: 0
SORE THROAT: 1
ABDOMINAL PAIN: 0
SINUS PRESSURE: 1
DIARRHEA: 0
SINUS PAIN: 1
NAUSEA: 0
WHEEZING: 1

## 2025-06-09 NOTE — PROGRESS NOTES
Isabel Michaels (:  1960) is a 65 y.o. female,Established patient, here for evaluation of the following chief complaint(s):  Cough (Head and chest congestion, ears are full, back pain from coughing so hard. Symptoms started a week ago. )       Diagnosis Orders   1. Acute bacterial sinusitis  amoxicillin-clavulanate (AUGMENTIN) 875-125 MG per tablet          Assessment & Plan  Acute bacterial sinusitis  Augmentin for 10 days  Continue to use albuterol HFA as needed  Continue Tessalon Perles and prednisone   Increase fluids  Use Tylenol/Ibuprofen for pain or fever  Over the counter cough/cold medications may be used  1 tsp of honey in warm water will soothe throat and help with cough  Nasal saline nasal spray can be used to keep nasal passages moist and clear   Orders:    amoxicillin-clavulanate (AUGMENTIN) 875-125 MG per tablet; Take 1 tablet by mouth 2 times daily for 10 days      Return if symptoms worsen or fail to improve.       Subjective   Isabel Michaels presents today with complaints of  congestion 1 week ago. She states she was outside and noticed some SOB. She also has bilateral ear pain and back pain. She states she tried Benadryl. She also used Tessalon Perles, albuterol and prednisone which has helped. She also has sore throat. She denies ill contacts. She states her cough is productive. She is taking Zyrtec daily.    Cough  This is a new problem. The current episode started in the past 7 days. The problem has been unchanged. The problem occurs constantly. The cough is Productive of sputum. Associated symptoms include chills, ear pain, headaches, myalgias, a sore throat and wheezing. Pertinent negatives include no chest pain, fever or shortness of breath. She has tried prescription cough suppressant, a beta-agonist inhaler and oral steroids for the symptoms. The treatment provided mild relief.       Review of Systems   Constitutional:  Positive for chills. Negative for fever.   HENT:

## 2025-06-10 DIAGNOSIS — N95.9 POSTMENOPAUSAL SYMPTOMS: ICD-10-CM

## 2025-06-10 NOTE — TELEPHONE ENCOUNTER
Last OV: 6/9/2025  Last RX:    Next scheduled apt: 12/2/2025     .  LABS:                         13.2   5.60  )-----------( 217      ( 29 Sep 2019 12:18 )             41.4     09-29    139  |  93<L>  |  9   ----------------------------<  164<H>  3.6   |  27  |  1.01    Ca    9.4      29 Sep 2019 12:18  Phos  3.3     09-29  Mg     1.6     09-29    TPro  8.0  /  Alb  4.5  /  TBili  0.8  /  DBili  x   /  AST  154<H>  /  ALT  101<H>  /  AlkPhos  82  09-29    PT/INR - ( 29 Sep 2019 12:18 )   PT: 10.9 sec;   INR: 0.97          PTT - ( 29 Sep 2019 12:18 )  PTT:27.6 sec    CARDIAC MARKERS ( 29 Sep 2019 12:18 )  x     / <0.01 ng/mL / x     / x     / x                RADIOLOGY, EKG & ADDITIONAL TESTS: Reviewed.

## 2025-06-18 DIAGNOSIS — K21.9 GASTROESOPHAGEAL REFLUX DISEASE WITHOUT ESOPHAGITIS: ICD-10-CM

## 2025-06-18 RX ORDER — OMEPRAZOLE 20 MG/1
20 CAPSULE, DELAYED RELEASE ORAL DAILY
Qty: 90 CAPSULE | Refills: 1 | Status: SHIPPED | OUTPATIENT
Start: 2025-06-18

## 2025-07-12 DIAGNOSIS — I10 BENIGN ESSENTIAL HTN: ICD-10-CM

## 2025-07-14 RX ORDER — HYDROCHLOROTHIAZIDE 25 MG/1
25 TABLET ORAL DAILY
Qty: 90 TABLET | Refills: 1 | Status: SHIPPED | OUTPATIENT
Start: 2025-07-14

## 2025-08-07 ENCOUNTER — TELEPHONE (OUTPATIENT)
Dept: FAMILY MEDICINE CLINIC | Age: 65
End: 2025-08-07

## 2025-08-07 DIAGNOSIS — K57.92 DIVERTICULITIS: Primary | ICD-10-CM

## 2025-08-07 RX ORDER — FLUCONAZOLE 150 MG/1
150 TABLET ORAL ONCE
Qty: 1 TABLET | Refills: 0 | Status: SHIPPED | OUTPATIENT
Start: 2025-08-07 | End: 2025-08-07

## (undated) DEVICE — Device: Brand: DEFENDO VALVE AND CONNECTOR KIT

## (undated) DEVICE — MEDI-VAC NON-CONDUCTIVE SUCTION TUBING 6MM X 6.1M (20 FT.) L: Brand: CARDINAL HEALTH

## (undated) DEVICE — FORCEPS BX L240CM JAW DIA2.2MM RAD JAW 4 HOT DISP

## (undated) DEVICE — GOWN,AURORA,NONRNF,XL,30/CS: Brand: MEDLINE

## (undated) DEVICE — 4-PORT MANIFOLD: Brand: NEPTUNE 2

## (undated) DEVICE — ENDO KIT W/SYRINGE: Brand: MEDLINE INDUSTRIES, INC.

## (undated) DEVICE — JELLY LUBRICATING 4OZ FLIP TOP TB E Z

## (undated) DEVICE — FORCEPS BX L240CM JAW DIA2.8MM L CAP W/ NDL MIC MESH TOOTH